# Patient Record
Sex: FEMALE | Race: WHITE | NOT HISPANIC OR LATINO | Employment: UNEMPLOYED | URBAN - METROPOLITAN AREA
[De-identification: names, ages, dates, MRNs, and addresses within clinical notes are randomized per-mention and may not be internally consistent; named-entity substitution may affect disease eponyms.]

---

## 2017-01-18 ENCOUNTER — HOSPITAL ENCOUNTER (OUTPATIENT)
Dept: RADIOLOGY | Facility: HOSPITAL | Age: 77
Discharge: HOME | End: 2017-01-18
Attending: INTERNAL MEDICINE
Payer: MEDICARE

## 2017-01-18 DIAGNOSIS — Z12.31 OTHER SCREENING MAMMOGRAM: ICD-10-CM

## 2017-01-18 PROCEDURE — 77067 SCR MAMMO BI INCL CAD: CPT | Mod: 26,,, | Performed by: RADIOLOGY

## 2017-01-18 PROCEDURE — 77067 SCR MAMMO BI INCL CAD: CPT | Mod: TC

## 2018-02-01 ENCOUNTER — HOSPITAL ENCOUNTER (OUTPATIENT)
Dept: RADIOLOGY | Facility: HOSPITAL | Age: 78
Discharge: HOME | End: 2018-02-01
Attending: INTERNAL MEDICINE
Payer: MEDICARE

## 2018-02-01 VITALS — BODY MASS INDEX: 21.68 KG/M2 | WEIGHT: 127 LBS | HEIGHT: 64 IN

## 2018-02-01 DIAGNOSIS — Z12.39 SCREENING FOR BREAST CANCER: ICD-10-CM

## 2018-02-01 PROCEDURE — 77063 BREAST TOMOSYNTHESIS BI: CPT | Mod: 26,,, | Performed by: RADIOLOGY

## 2018-02-01 PROCEDURE — 77067 SCR MAMMO BI INCL CAD: CPT | Mod: 26,,, | Performed by: RADIOLOGY

## 2018-02-01 PROCEDURE — 77067 SCR MAMMO BI INCL CAD: CPT | Mod: TC

## 2018-07-18 ENCOUNTER — TELEPHONE (OUTPATIENT)
Dept: AUDIOLOGY | Facility: CLINIC | Age: 78
End: 2018-07-18

## 2019-03-12 ENCOUNTER — HOSPITAL ENCOUNTER (OUTPATIENT)
Dept: RADIOLOGY | Facility: HOSPITAL | Age: 79
Discharge: HOME | End: 2019-03-12
Attending: INTERNAL MEDICINE
Payer: MEDICARE

## 2019-03-12 VITALS — WEIGHT: 127 LBS | BODY MASS INDEX: 22.5 KG/M2

## 2019-03-12 DIAGNOSIS — Z12.39 BREAST CANCER SCREENING: ICD-10-CM

## 2019-03-12 PROCEDURE — 77067 MAMMO DIGITAL SCREENING BILAT WITH CAD: ICD-10-PCS | Mod: 26,,, | Performed by: RADIOLOGY

## 2019-03-12 PROCEDURE — 77067 SCR MAMMO BI INCL CAD: CPT | Mod: 26,,, | Performed by: RADIOLOGY

## 2019-03-12 PROCEDURE — 77067 SCR MAMMO BI INCL CAD: CPT | Mod: TC

## 2019-03-13 ENCOUNTER — TELEPHONE (OUTPATIENT)
Dept: RADIOLOGY | Facility: HOSPITAL | Age: 79
End: 2019-03-13

## 2019-03-13 NOTE — TELEPHONE ENCOUNTER
Spoke with patient and explained mammogram findings.Patient expressed understanding of results. Patient is scheduled for a abnormal mammogram follow up appointment at [facility] [place] on 3/14/2019.

## 2019-03-14 ENCOUNTER — HOSPITAL ENCOUNTER (OUTPATIENT)
Dept: RADIOLOGY | Facility: HOSPITAL | Age: 79
Discharge: HOME | End: 2019-03-14
Attending: INTERNAL MEDICINE
Payer: MEDICARE

## 2019-03-14 DIAGNOSIS — R92.8 ABNORMAL MAMMOGRAM: ICD-10-CM

## 2019-03-14 PROCEDURE — 77065 MAMMO DIGITAL DIAGNOSTIC RIGHT WITH TOMOSYNTHESIS_CAD: ICD-10-PCS | Mod: 26,,, | Performed by: RADIOLOGY

## 2019-03-14 PROCEDURE — 76642 US BREAST RIGHT LIMITED: ICD-10-PCS | Mod: 26,RT,, | Performed by: RADIOLOGY

## 2019-03-14 PROCEDURE — 77065 DX MAMMO INCL CAD UNI: CPT | Mod: TC,PO

## 2019-03-14 PROCEDURE — 77061 MAMMO DIGITAL DIAGNOSTIC RIGHT WITH TOMOSYNTHESIS_CAD: ICD-10-PCS | Mod: 26,,, | Performed by: RADIOLOGY

## 2019-03-14 PROCEDURE — 76642 ULTRASOUND BREAST LIMITED: CPT | Mod: TC,PO,RT

## 2019-03-14 PROCEDURE — 77065 DX MAMMO INCL CAD UNI: CPT | Mod: 26,,, | Performed by: RADIOLOGY

## 2019-03-14 PROCEDURE — 76642 ULTRASOUND BREAST LIMITED: CPT | Mod: 26,RT,, | Performed by: RADIOLOGY

## 2019-03-14 PROCEDURE — 77061 BREAST TOMOSYNTHESIS UNI: CPT | Mod: TC,PO

## 2019-03-14 PROCEDURE — 77061 BREAST TOMOSYNTHESIS UNI: CPT | Mod: 26,,, | Performed by: RADIOLOGY

## 2019-03-15 ENCOUNTER — TELEPHONE (OUTPATIENT)
Dept: RADIOLOGY | Facility: HOSPITAL | Age: 79
End: 2019-03-15

## 2019-03-15 NOTE — TELEPHONE ENCOUNTER
Spoke with patient. Reviewed breast biopsy procedure and reviewed instructions for breast biopsy. Patient expressed understanding and all questions were answered. Provided patient with my phone number to call for any further concerns or questions.   Patient scheduled breast biopsy at the [facility] on 3/22/2019

## 2019-03-22 ENCOUNTER — HOSPITAL ENCOUNTER (OUTPATIENT)
Dept: RADIOLOGY | Facility: HOSPITAL | Age: 79
Discharge: HOME | End: 2019-03-22
Attending: INTERNAL MEDICINE
Payer: MEDICARE

## 2019-03-22 DIAGNOSIS — R92.8 ABNORMAL MAMMOGRAM: ICD-10-CM

## 2019-03-22 DIAGNOSIS — N63.0 LUMP OR MASS IN BREAST: Primary | ICD-10-CM

## 2019-03-22 PROCEDURE — 27201044 MAMMO BREAST STEREOTACTIC BREAST BIOPSY RIGHT: Mod: PO

## 2019-03-22 PROCEDURE — 88305 TISSUE EXAM BY PATHOLOGIST: CPT | Performed by: PATHOLOGY

## 2019-03-22 PROCEDURE — 88305 TISSUE SPECIMEN TO PATHOLOGY, RADIOLOGY: ICD-10-PCS | Mod: 26,,, | Performed by: PATHOLOGY

## 2019-03-22 PROCEDURE — 19081 BX BREAST 1ST LESION STRTCTC: CPT | Mod: PO,RT

## 2019-03-22 PROCEDURE — 19081 BX BREAST 1ST LESION STRTCTC: CPT | Mod: RT,,, | Performed by: RADIOLOGY

## 2019-03-22 PROCEDURE — 19081 MAMMO BREAST STEREOTACTIC BREAST BIOPSY RIGHT: ICD-10-PCS | Mod: RT,,, | Performed by: RADIOLOGY

## 2019-03-22 PROCEDURE — 88305 TISSUE EXAM BY PATHOLOGIST: CPT | Mod: 26,,, | Performed by: PATHOLOGY

## 2019-03-22 PROCEDURE — 25000003 PHARM REV CODE 250: Mod: PO | Performed by: INTERNAL MEDICINE

## 2019-03-22 RX ORDER — LIDOCAINE HYDROCHLORIDE 10 MG/ML
1 INJECTION, SOLUTION EPIDURAL; INFILTRATION; INTRACAUDAL; PERINEURAL ONCE
Status: COMPLETED | OUTPATIENT
Start: 2019-03-22 | End: 2019-03-22

## 2019-03-22 RX ORDER — LIDOCAINE HYDROCHLORIDE AND EPINEPHRINE 20; 10 MG/ML; UG/ML
8 INJECTION, SOLUTION INFILTRATION; PERINEURAL ONCE
Status: COMPLETED | OUTPATIENT
Start: 2019-03-22 | End: 2019-03-22

## 2019-03-22 RX ADMIN — LIDOCAINE HYDROCHLORIDE,EPINEPHRINE BITARTRATE 8 ML: 20; .01 INJECTION, SOLUTION INFILTRATION; PERINEURAL at 11:03

## 2019-03-22 RX ADMIN — LIDOCAINE HYDROCHLORIDE 1 ML: 10 INJECTION, SOLUTION EPIDURAL; INFILTRATION; INTRACAUDAL; PERINEURAL at 11:03

## 2019-03-25 ENCOUNTER — DOCUMENTATION ONLY (OUTPATIENT)
Dept: SURGERY | Facility: CLINIC | Age: 79
End: 2019-03-25

## 2019-03-25 NOTE — PROGRESS NOTES
Patient phoned with results of core breast biopsy, benign FA, informed of need for 6 month f/u mmg

## 2019-05-22 ENCOUNTER — OFFICE VISIT (OUTPATIENT)
Dept: OTOLARYNGOLOGY | Facility: CLINIC | Age: 79
End: 2019-05-22
Payer: MEDICARE

## 2019-05-22 ENCOUNTER — TELEPHONE (OUTPATIENT)
Dept: OTOLARYNGOLOGY | Facility: CLINIC | Age: 79
End: 2019-05-22

## 2019-05-22 VITALS
DIASTOLIC BLOOD PRESSURE: 76 MMHG | SYSTOLIC BLOOD PRESSURE: 164 MMHG | HEART RATE: 70 BPM | BODY MASS INDEX: 22.5 KG/M2 | WEIGHT: 127 LBS

## 2019-05-22 DIAGNOSIS — H61.91 LESION OF RIGHT EXTERNAL EAR: Primary | ICD-10-CM

## 2019-05-22 PROCEDURE — 99202 OFFICE O/P NEW SF 15 MIN: CPT | Mod: S$PBB,,, | Performed by: OTOLARYNGOLOGY

## 2019-05-22 PROCEDURE — 99999 PR PBB SHADOW E&M-EST. PATIENT-LVL III: CPT | Mod: PBBFAC,,, | Performed by: OTOLARYNGOLOGY

## 2019-05-22 PROCEDURE — 99999 PR PBB SHADOW E&M-EST. PATIENT-LVL III: ICD-10-PCS | Mod: PBBFAC,,, | Performed by: OTOLARYNGOLOGY

## 2019-05-22 PROCEDURE — 99213 OFFICE O/P EST LOW 20 MIN: CPT | Mod: PBBFAC | Performed by: OTOLARYNGOLOGY

## 2019-05-22 PROCEDURE — 99202 PR OFFICE/OUTPT VISIT, NEW, LEVL II, 15-29 MIN: ICD-10-PCS | Mod: S$PBB,,, | Performed by: OTOLARYNGOLOGY

## 2019-05-22 NOTE — LETTER
May 22, 2019      Abran Terrazas MD  [address]  [address]  [facility] [address]           [facility] - Otorhinolaryngology  [address]  [facility] [address]  Phone: [phone]  Fax: [phone]          Patient: Ellen Neves   MR Number: [id number]   YOB: 1940   Date of Visit: 5/22/2019       Dear Dr. Abran Terrazas:    Thank you for referring Ellen Neves to me for evaluation. Attached you will find relevant portions of my assessment and plan of care.    If you have questions, please do not hesitate to call me. I look forward to following Ellen Neves along with you.    Sincerely,    Jim Ireland III, MD    Enclosure  CC:  No Recipients    If you would like to receive this communication electronically, please contact [email or web address] or [phone] to request more information on [employer] Link access.    For providers and/or their staff who would like to refer a patient to [facility], please contact us through our one-stop-shop provider referral line, [facility], at [phone].    If you feel you have received this communication in error or would no longer like to receive these types of communications, please e-mail [email or web address]

## 2019-05-22 NOTE — TELEPHONE ENCOUNTER
----- Message from Yani Velasco sent at 5/22/2019  4:03 PM CDT -----  Contact: patient  [phone]-please call above patient wants sooner appointment waiting on a call from the nurse thanks

## 2019-05-22 NOTE — PROGRESS NOTES
Subjective:       Patient ID: Ellen Neves is a 79 y.o. female.    Chief Complaint: Otitis Media    HPI: Ms. Neves is an 79-year-old fair complected  female who relates how much help I gave her for treatment of her right ear lesion in 2011.   Her hand written reason for the visit today is ear infection .  She indicates some pain and inflammation of a lesion of her right pinna which will not heal entirely at this point.  It will improve with antibiotic treatment for a while but then recurs..   I am unable define EMR notes regarding her treatment of the problem, however.  There is a note in the EMR about her chronic opioid use.  She was referred to me by her dermatologist Sarah Vazquez who recently biopsied the  right pinna lesion.  It was, apparently, benign.  I would like to see the report if possible.  She indicates inflammation of an area of her right pinna which will not heal completely at this point.  She lives here but has a house in [facility].    She was examined by ENT physician in [place] who prescribed an oral course of doxycycline antibiotics for 10 days for treatment of the right pinna lesion.  Her ear did get better.  She finished that course 2 weeks ago.  She was also prescribed Bactroban ointment for topical treatment of the lesion as well as a short course of triamcinolone 0.1% x5 days afterward.  She used to complain of pain and discomfort of the right pinna at this point.  The lesion will not heal entirely at this point.  She admits to applying the Bactroban with her finger.  I have asked my colleague Dr. NEMESIO Nunez to step in and give his advice about treatment of her right pinna lesion this afternoon.    Past Medical History:   Diagnosis Date    Anemia     Cataract     GERD (gastroesophageal reflux disease)     Hypertension     Macular degeneration     Migraine      Current Outpatient Medications on File Prior to Visit   Medication Sig Dispense Refill     amLODIPine (NORVASC) 5 MG tablet TAKE 1 TABLET DAILY 90 tablet 3    citalopram (CELEXA) 20 MG tablet TAKE 1 TABLET DAILY 90 tablet 3    clobetasol 0.05% (TEMOVATE) 0.05 % Oint   0    hydroCHLOROthiazide (HYDRODIURIL) 25 MG tablet Take 1 tablet (25 mg total) by mouth once daily. For blood pressure. 90 tablet 3    lisinopril (PRINIVIL,ZESTRIL) 40 MG tablet Take 1 tablet (40 mg total) by mouth once daily. 90 tablet 3    nadolol (CORGARD) 40 MG tablet TAKE 1 TABLET TWICE A  tablet 3    traMADol (ULTRAM) 50 mg tablet TAKE 1 TABLET BY MOUTH EVERY 6-8 HOURS AS NEEDED FOR PAIN 100 tablet 0    zolpidem (AMBIEN) 10 mg Tab TAKE 1 TABLET(10 MG) BY MOUTH EVERY NIGHT AS NEEDED 90 tablet 0     No current facility-administered medications on file prior to visit.      Past Surgical History:   Procedure Laterality Date    APPENDECTOMY      BREAST BIOPSY Left     EXCISIONAL BX    BREAST SURGERY      biopsy    BUNIONECTOMY      COSMETIC SURGERY      facelift    EYE SURGERY      bilateral cataracts    Family history:  High blood pressure  Habits:  1 alcoholic drink per day 1 caffeinated drink per day  Occupation:  Retired    Review of Systems   Ears: Positive for ear pain.    Other:  Negative for rash.           Objective:     Blood pressure 164/76 pulse 70 height 5 ft 3 in weight 127 lb  General:  Alert and oriented lady in no acute distress  Physical Exam   Constitutional: She is oriented to person, place, and time. She appears well-developed and well-nourished.   HENT:   Head: Normocephalic.   Right Ear: Tympanic membrane and external ear normal. No drainage. No foreign bodies. No mastoid tenderness. Tympanic membrane is not perforated. No decreased hearing is noted.   Left Ear: Tympanic membrane and external ear normal. No drainage. No foreign bodies. No mastoid tenderness. Tympanic membrane is not perforated. No decreased hearing is noted.   Ears:    Nose: Nose normal. No nasal deformity, septal deviation or  nasal septal hematoma. No epistaxis. Right sinus exhibits no maxillary sinus tenderness and no frontal sinus tenderness. Left sinus exhibits no maxillary sinus tenderness and no frontal sinus tenderness.   Mouth/Throat: Uvula is midline, oropharynx is clear and moist and mucous membranes are normal. No oral lesions. No trismus in the jaw. No uvula swelling. No oropharyngeal exudate or tonsillar abscesses.   Neck: Neck supple. No tracheal deviation present. No thyromegaly present.   Pulmonary/Chest: Effort normal. No stridor.   Lymphadenopathy:     She has no cervical adenopathy.   Neurological: She is alert and oriented to person, place, and time.   Skin: No rash noted.       Assessment:       1. Lesion of right external ear        Plan:     Written Rx for medrol dospak 4 mg # 21; take 6 pills x 1 day, 5 pills x next day, 4 pills x nextday, 3 pills x next day, 2 pills x next day, 1 pill x next  day all with food; take for inflammation  May clean wound with peroxide; use sterile cotton swabs only ( not finger)   May apply Bactroban to ear wound nightly x 10-14 days with sterile swabs  Written Rx for Cipro 500 mg # 20; take BID for ear infection ( may use only after completion of oral steroid course )    Patient warned about tendon/joint side effects  Avoid laying on right pinna/trauma to lesion/ wound  RTC 2 weeks  Recent ear bx report requested ( dermatologist CHRIS Vazquez)

## 2019-05-22 NOTE — PATIENT INSTRUCTIONS
Written Rx for medrol dospak 4 mg # 21; take 6 pills x 1 day, 5 pills x next day, 4 pills x nextday, 3 pills x next day, 2 pills x next day, 1 pill x next  day all with food; take for inflammation  May clean wound with peroxide; use sterile cotton swabs only ( not finger)   May apply Bactroban to ear wound nightly x 10-14 days  Written Rx for Cipro 500 mg # 20; take BID for ear infection ( may use only after completion of oral steroid course )    Patient warned about tendon/joint side effects  RTC 2 weeks  Recent ear bx report requested ( dermatologist CHRIS Vazquez)

## 2019-06-05 DIAGNOSIS — H66.90 EAR INFECTION: Primary | ICD-10-CM

## 2019-06-19 ENCOUNTER — OFFICE VISIT (OUTPATIENT)
Dept: OTOLARYNGOLOGY | Facility: CLINIC | Age: 79
End: 2019-06-19
Payer: MEDICARE

## 2019-06-19 VITALS — SYSTOLIC BLOOD PRESSURE: 136 MMHG | DIASTOLIC BLOOD PRESSURE: 72 MMHG | HEART RATE: 68 BPM

## 2019-06-19 DIAGNOSIS — H61.91 LESION OF EXTERNAL EAR CANAL, RIGHT: Primary | ICD-10-CM

## 2019-06-19 PROCEDURE — 99212 PR OFFICE/OUTPT VISIT, EST, LEVL II, 10-19 MIN: ICD-10-PCS | Mod: S$PBB,,, | Performed by: OTOLARYNGOLOGY

## 2019-06-19 PROCEDURE — 99999 PR PBB SHADOW E&M-EST. PATIENT-LVL III: CPT | Mod: PBBFAC,,, | Performed by: OTOLARYNGOLOGY

## 2019-06-19 PROCEDURE — 99999 PR PBB SHADOW E&M-EST. PATIENT-LVL III: ICD-10-PCS | Mod: PBBFAC,,, | Performed by: OTOLARYNGOLOGY

## 2019-06-19 PROCEDURE — 99212 OFFICE O/P EST SF 10 MIN: CPT | Mod: S$PBB,,, | Performed by: OTOLARYNGOLOGY

## 2019-06-19 PROCEDURE — 99213 OFFICE O/P EST LOW 20 MIN: CPT | Mod: PBBFAC | Performed by: OTOLARYNGOLOGY

## 2019-06-19 NOTE — PROGRESS NOTES
Subjective:       Patient ID: Ellen Neves is a 79 y.o. female.    Chief Complaint: Follow-up    HPI: Ms. Neves is an 79-year-old  female indicates significant improvement and healing of her right pinna lesion now.  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 She was prescribed an oral course of Cipro 500 mg twice a day for 10 days as well as a Medrol Dosepak.  She was advised to apply Bactroban to the wound after gentle cleaning with peroxide.  She started a 2nd round of oral Cipro per her request 6/5/19; after taking the medication for several days, she began to  feel poorly with possible joint pain.  She may have been suffering with a bug .  She stopped taking the medication.  She had been applying Bactroban ointment to the lesion as advised which caused some burning; that treatment was also discontinued.   She has been utilizing Aquaphore healing ointment with good results at this point.  The  Her dermatologist is Dr. Sarah Vazquez.  I was supposed to have received the path report from her previous ear biopsy; I cannot located in the EMR presently.  She has no other complaints today.  Her right ear does not bother her at this point.    Past Medical History:   Diagnosis Date    Anemia     Cataract     GERD (gastroesophageal reflux disease)     Hypertension     Macular degeneration     Migraine      Current Outpatient Medications on File Prior to Visit   Medication Sig Dispense Refill    amLODIPine (NORVASC) 5 MG tablet TAKE 1 TABLET DAILY 90 tablet 3    citalopram (CELEXA) 20 MG tablet TAKE 1 TABLET DAILY 90 tablet 3    clobetasol 0.05% (TEMOVATE) 0.05 % Oint   0    hydroCHLOROthiazide (HYDRODIURIL) 25 MG tablet Take 1 tablet (25 mg total) by mouth once daily. For blood pressure. 90 tablet 3    lisinopril (PRINIVIL,ZESTRIL) 40 MG tablet Take 1 tablet (40 mg total) by mouth once daily. 90 tablet 3    lisinopril  (PRINIVIL,ZESTRIL) 40 MG tablet TAKE 1 TABLET DAILY 90 tablet 3    nadolol (CORGARD) 40 MG tablet TAKE 1 TABLET TWICE A  tablet 3    traMADol (ULTRAM) 50 mg tablet TAKE 1 TABLET BY MOUTH EVERY 6-8 HOURS AS NEEDED FOR PAIN 100 tablet 0    zolpidem (AMBIEN) 10 mg Tab TAKE 1 TABLET(10 MG) BY MOUTH EVERY NIGHT AS NEEDED 90 tablet 0     No current facility-administered medications on file prior to visit.          Review of Systems        Objective:     Blood pressure 136/72 pulse 68 height 5 ft 3 in weight 126 lb  General:  Alert and oriented lady in no acute distress  Physical Exam   HENT:   Ears:        Assessment:       1. Lesion of external ear canal, right        Plan:       Right pinna lesion appears healed now  RTC prn

## 2019-07-23 ENCOUNTER — TELEPHONE (OUTPATIENT)
Dept: OTOLARYNGOLOGY | Facility: CLINIC | Age: 79
End: 2019-07-23

## 2019-07-29 ENCOUNTER — OFFICE VISIT (OUTPATIENT)
Dept: OTOLARYNGOLOGY | Facility: CLINIC | Age: 79
End: 2019-07-29
Payer: MEDICARE

## 2019-07-29 ENCOUNTER — CLINICAL SUPPORT (OUTPATIENT)
Dept: AUDIOLOGY | Facility: CLINIC | Age: 79
End: 2019-07-29
Payer: MEDICARE

## 2019-07-29 VITALS — DIASTOLIC BLOOD PRESSURE: 72 MMHG | HEART RATE: 61 BPM | SYSTOLIC BLOOD PRESSURE: 151 MMHG

## 2019-07-29 DIAGNOSIS — H93.8X2 EAR PRESSURE, LEFT: Primary | ICD-10-CM

## 2019-07-29 DIAGNOSIS — H91.90 PERCEIVED HEARING LOSS: ICD-10-CM

## 2019-07-29 DIAGNOSIS — H90.3 SENSORINEURAL HEARING LOSS, BILATERAL: Primary | ICD-10-CM

## 2019-07-29 DIAGNOSIS — H61.22 HEARING LOSS DUE TO CERUMEN IMPACTION, LEFT: ICD-10-CM

## 2019-07-29 PROCEDURE — 99213 OFFICE O/P EST LOW 20 MIN: CPT | Mod: PBBFAC,25,27 | Performed by: OTOLARYNGOLOGY

## 2019-07-29 PROCEDURE — 99999 PR PBB SHADOW E&M-EST. PATIENT-LVL III: ICD-10-PCS | Mod: PBBFAC,,, | Performed by: OTOLARYNGOLOGY

## 2019-07-29 PROCEDURE — 69210 REMOVE IMPACTED EAR WAX UNI: CPT | Mod: S$PBB,,, | Performed by: OTOLARYNGOLOGY

## 2019-07-29 PROCEDURE — 99999 PR PBB SHADOW E&M-EST. PATIENT-LVL I: CPT | Mod: PBBFAC,,,

## 2019-07-29 PROCEDURE — 92567 TYMPANOMETRY: CPT | Mod: PBBFAC | Performed by: AUDIOLOGIST-HEARING AID FITTER

## 2019-07-29 PROCEDURE — 92557 COMPREHENSIVE HEARING TEST: CPT | Mod: PBBFAC | Performed by: AUDIOLOGIST-HEARING AID FITTER

## 2019-07-29 PROCEDURE — 99213 PR OFFICE/OUTPT VISIT, EST, LEVL III, 20-29 MIN: ICD-10-PCS | Mod: 25,S$PBB,, | Performed by: OTOLARYNGOLOGY

## 2019-07-29 PROCEDURE — 69210 REMOVE IMPACTED EAR WAX UNI: CPT | Mod: PBBFAC | Performed by: OTOLARYNGOLOGY

## 2019-07-29 PROCEDURE — 99213 OFFICE O/P EST LOW 20 MIN: CPT | Mod: 25,S$PBB,, | Performed by: OTOLARYNGOLOGY

## 2019-07-29 PROCEDURE — 99211 OFF/OP EST MAY X REQ PHY/QHP: CPT | Mod: PBBFAC,25

## 2019-07-29 PROCEDURE — 99999 PR PBB SHADOW E&M-EST. PATIENT-LVL I: ICD-10-PCS | Mod: PBBFAC,,,

## 2019-07-29 PROCEDURE — 69210 PR REMOVAL IMPACTED CERUMEN REQUIRING INSTRUMENTATION, UNILATERAL: ICD-10-PCS | Mod: S$PBB,,, | Performed by: OTOLARYNGOLOGY

## 2019-07-29 PROCEDURE — 99999 PR PBB SHADOW E&M-EST. PATIENT-LVL III: CPT | Mod: PBBFAC,,, | Performed by: OTOLARYNGOLOGY

## 2019-07-29 NOTE — PATIENT INSTRUCTIONS
C.I. extracted from occluded AS eac  Audiometry reviewed: AS > AD SNHL  Low sodium diet encouraged; literature provided  Continue diuretic use  Meniere's/cochlear hydrops  literature provided  RTC 2 weeks; repeat AS audiometry ;  Previous audiogram may be helpful ( Dr. CARIDAD Terrazas)  Patient may be a candidate for amplification for 1 or both ears pending course/stability of hearing loss
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
## 2019-07-29 NOTE — PROGRESS NOTES
Ellen Neves was seen in the clinic today for a hearing evaluation. Ms. Neves reported aural fullness and hearing loss worse in her left ear.      Audiological testing revealed a mild to moderate sensorineural hearing loss in the right ear and a mild to moderately severe sensorineural hearing loss in the left ear. A speech reception threshold was obtained at 25 dBHL in the right ear and 40 dBHL in the left ear. Speech recognition was 96% in the right ear and 92% in the left ear.    Tympanometry revealed normal Type A tympanograms, bilaterally.    Recommendations:  1. Otologic evaluation  2. Annual hearing evaluation  3. Hearing aid consult

## 2019-08-19 ENCOUNTER — CLINICAL SUPPORT (OUTPATIENT)
Dept: AUDIOLOGY | Facility: CLINIC | Age: 79
End: 2019-08-19
Payer: MEDICARE

## 2019-08-19 ENCOUNTER — OFFICE VISIT (OUTPATIENT)
Dept: OTOLARYNGOLOGY | Facility: CLINIC | Age: 79
End: 2019-08-19
Payer: MEDICARE

## 2019-08-19 VITALS — SYSTOLIC BLOOD PRESSURE: 181 MMHG | DIASTOLIC BLOOD PRESSURE: 71 MMHG | HEART RATE: 61 BPM

## 2019-08-19 DIAGNOSIS — E87.1 HYPONATREMIA: ICD-10-CM

## 2019-08-19 DIAGNOSIS — H90.3 SENSORINEURAL HEARING LOSS, BILATERAL: Primary | ICD-10-CM

## 2019-08-19 PROCEDURE — 99213 OFFICE O/P EST LOW 20 MIN: CPT | Mod: PBBFAC,25 | Performed by: OTOLARYNGOLOGY

## 2019-08-19 PROCEDURE — 99999 PR PBB SHADOW E&M-EST. PATIENT-LVL III: CPT | Mod: PBBFAC,,, | Performed by: OTOLARYNGOLOGY

## 2019-08-19 PROCEDURE — 99213 OFFICE O/P EST LOW 20 MIN: CPT | Mod: S$PBB,,, | Performed by: OTOLARYNGOLOGY

## 2019-08-19 PROCEDURE — 99999 PR PBB SHADOW E&M-EST. PATIENT-LVL III: ICD-10-PCS | Mod: PBBFAC,,, | Performed by: OTOLARYNGOLOGY

## 2019-08-19 PROCEDURE — 99213 PR OFFICE/OUTPT VISIT, EST, LEVL III, 20-29 MIN: ICD-10-PCS | Mod: S$PBB,,, | Performed by: OTOLARYNGOLOGY

## 2019-08-19 PROCEDURE — 92557 COMPREHENSIVE HEARING TEST: CPT | Mod: 52,PBBFAC | Performed by: AUDIOLOGIST

## 2019-08-19 NOTE — PROGRESS NOTES
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 Abran Terrazas due to documented hyponatremia.  She had been recently examined by me 07/29/2019 for a pressure sensation in her left ear >> right ear as if she were on an airplane.  The otologic symptom occurred while she was at the beach on a vacation.    She also indicated dizziness symptoms.   Audiometry indicated a left ear multi frequency sensorineural hearing loss greater than right ear sensorineural hearing loss the left ear 40 decibel SRT score versus a right ear 25 decibel SRT score. She had indicated left aural fullness greater than right to the audiologist.  A hearing aid consultation was recommended pending her course impending to stability of her hearing over time.  I had recently treated her for right pinna inflammation of the anti helical skin which resolved with topical treatment.  She , now, ndicates slight improvement in the way she feels after discontinuing the diuretic.    She indicates a previous history of migraine headaches, improved after age 69.    She is no longer dizzy.  She denies significant ear pain or pressure symptoms now.  She does suffer with headaches at times.   Her left ear symptoms have not completely however.    Past Medical History:   Diagnosis Date    Anemia     Cataract     GERD (gastroesophageal reflux disease)     Hypertension     Macular degeneration     Migraine      Current Outpatient Medications on File Prior to Visit   Medication Sig Dispense Refill    amLODIPine (NORVASC) 5 MG tablet TAKE 1 TABLET DAILY 90 tablet 3    citalopram (CELEXA) 20 MG tablet TAKE 1 TABLET DAILY 90 tablet 3    clobetasol 0.05% (TEMOVATE) 0.05 % Oint   0    hydroCHLOROthiazide (HYDRODIURIL) 25 MG tablet Take 1 tablet (25 mg total) by mouth once daily. For blood pressure. 90  tablet 3    lisinopril (PRINIVIL,ZESTRIL) 40 MG tablet Take 1 tablet (40 mg total) by mouth once daily. 90 tablet 3    nadolol (CORGARD) 40 MG tablet TAKE 1 TABLET TWICE A  tablet 3    traMADol (ULTRAM) 50 mg tablet TAKE 1 TABLET BY MOUTH EVERY 6-8 HOURS AS NEEDED FOR PAIN 100 tablet 0    zolpidem (AMBIEN) 10 mg Tab TAKE 1 TABLET(10 MG) BY MOUTH EVERY NIGHT AS NEEDED 90 tablet 0     No current facility-administered medications on file prior to visit.      Past Surgical History:   Procedure Laterality Date    APPENDECTOMY      BREAST BIOPSY Left     EXCISIONAL BX    BREAST SURGERY      biopsy    BUNIONECTOMY      COSMETIC SURGERY      facelift    EYE SURGERY      bilateral cataracts         PE:  Blood pressure 181/71 pulse 61 height 5 ft 3 in weight 125 lb  General:  Alert and oriented lady in no acute distress  Both ears were examined per otoscopy.  Right eardrum is intact and clear right middle ear space is well aerated.  The right anti helical tissues appear well healed without inflammation.  Left eardrum is intact and clear as visualized, as before.  Ms. Neves completed an audiometric study today the results of which were duplicated below compared to those of a previous study.  I am in receipt of Dr. Abran Terrazas audiometric study performed sometime in 2018 in his office.    The rresults indicated bilateral 25 and 40 decibel hearing levels tested 4000 hertz 2000 hertz and 1000 hertz symmetrically.      DIAGNOSIS:     ICD-10-CM ICD-9-CM    1. Asymmetrical hearing loss of left ear H91.8X2 389.8    2. Hyponatremia; recent diuretic withdrawal E87.1 276.1      PLAN: AS audiometry reviewed; results compared to those of previous study ( audiometry per Dr. CARIDAD Terrazas, 2018)   Dietary salt restriction to be continued for now  Pt. will monitor electrolytes/blood pressure/otologic sx for next several weeks ( Dr. CARIDAD Terrazas)   RTC 3-4 weeks; repeat audiometry ( both ears)   Call for any significant change in  otologic status

## 2019-08-19 NOTE — PROGRESS NOTES
Ellen Neves was seen in the clinic today for a follow-up audiological evaluation of the left ear.    Audiological testing revealed a moderate rising to mild sloping to moderately-severe sensorineural hearing loss for the left ear.  A speech reception threshold was obtained at 40 dBHL for the left ear.  Speech discrimination was 92% for the left ear.      Recommendations:  1. Otologic evaluation  2. Annual audiological evaluation  3. Hearing protection when in noise   4. Hearing aid consultation

## 2019-08-19 NOTE — PATIENT INSTRUCTIONS
AS audiometry reviewed; results compared to those of previous study ( audiometry per Dr. Terrazas, 2018)   Dietary salt restriction to be continued for now  Pt. will monitor electrolytes/blood pressure/otologic sx for next several weeks ( Dr. CARIDAD Terrazas)   RTC 3-4 weeks; repeat audiometry ( both ears)   Call for any significant change in otologic status

## 2019-08-27 ENCOUNTER — LAB VISIT (OUTPATIENT)
Dept: LAB | Facility: OTHER | Age: 79
End: 2019-08-27
Attending: INTERNAL MEDICINE
Payer: MEDICARE

## 2019-08-27 DIAGNOSIS — R89.9 ABNORMAL LABORATORY TEST: ICD-10-CM

## 2019-08-27 LAB
ANION GAP SERPL CALC-SCNC: 6 MMOL/L (ref 8–16)
BUN SERPL-MCNC: 13 MG/DL (ref 8–23)
CALCIUM SERPL-MCNC: 10.3 MG/DL (ref 8.7–10.5)
CHLORIDE SERPL-SCNC: 100 MMOL/L (ref 95–110)
CO2 SERPL-SCNC: 27 MMOL/L (ref 23–29)
CREAT SERPL-MCNC: 0.7 MG/DL (ref 0.5–1.4)
EST. GFR  (AFRICAN AMERICAN): >60 ML/MIN/1.73 M^2
EST. GFR  (NON AFRICAN AMERICAN): >60 ML/MIN/1.73 M^2
GLUCOSE SERPL-MCNC: 96 MG/DL (ref 70–110)
POTASSIUM SERPL-SCNC: 4.8 MMOL/L (ref 3.5–5.1)
SODIUM SERPL-SCNC: 133 MMOL/L (ref 136–145)

## 2019-08-27 PROCEDURE — 36415 COLL VENOUS BLD VENIPUNCTURE: CPT

## 2019-08-27 PROCEDURE — 80048 BASIC METABOLIC PNL TOTAL CA: CPT

## 2019-09-17 ENCOUNTER — HOSPITAL ENCOUNTER (OUTPATIENT)
Dept: RADIOLOGY | Facility: HOSPITAL | Age: 79
Discharge: HOME | End: 2019-09-17
Attending: INTERNAL MEDICINE
Payer: MEDICARE

## 2019-09-17 VITALS — BODY MASS INDEX: 22.15 KG/M2 | HEIGHT: 63 IN | WEIGHT: 125 LBS

## 2019-09-17 DIAGNOSIS — R92.8 ABNORMAL MAMMOGRAM: ICD-10-CM

## 2019-09-17 PROCEDURE — 77061 BREAST TOMOSYNTHESIS UNI: CPT | Mod: 26,,, | Performed by: RADIOLOGY

## 2019-09-17 PROCEDURE — 77065 MAMMO DIGITAL DIAGNOSTIC RIGHT WITH TOMOSYNTHESIS_CAD: ICD-10-PCS | Mod: 26,,, | Performed by: RADIOLOGY

## 2019-09-17 PROCEDURE — 77061 MAMMO DIGITAL DIAGNOSTIC RIGHT WITH TOMOSYNTHESIS_CAD: ICD-10-PCS | Mod: 26,,, | Performed by: RADIOLOGY

## 2019-09-17 PROCEDURE — 77061 BREAST TOMOSYNTHESIS UNI: CPT | Mod: TC,PO

## 2019-09-17 PROCEDURE — 77065 DX MAMMO INCL CAD UNI: CPT | Mod: TC,PO

## 2019-09-17 PROCEDURE — 77065 DX MAMMO INCL CAD UNI: CPT | Mod: 26,,, | Performed by: RADIOLOGY

## 2019-10-28 ENCOUNTER — CLINICAL SUPPORT (OUTPATIENT)
Dept: AUDIOLOGY | Facility: CLINIC | Age: 79
End: 2019-10-28
Payer: MEDICARE

## 2019-10-28 ENCOUNTER — OFFICE VISIT (OUTPATIENT)
Dept: OTOLARYNGOLOGY | Facility: CLINIC | Age: 79
End: 2019-10-28
Payer: MEDICARE

## 2019-10-28 VITALS
BODY MASS INDEX: 21.87 KG/M2 | SYSTOLIC BLOOD PRESSURE: 118 MMHG | WEIGHT: 123.44 LBS | HEART RATE: 71 BPM | DIASTOLIC BLOOD PRESSURE: 77 MMHG

## 2019-10-28 PROCEDURE — 99213 OFFICE O/P EST LOW 20 MIN: CPT | Mod: PBBFAC,25 | Performed by: OTOLARYNGOLOGY

## 2019-10-28 PROCEDURE — 99213 OFFICE O/P EST LOW 20 MIN: CPT | Mod: S$PBB,,, | Performed by: OTOLARYNGOLOGY

## 2019-10-28 PROCEDURE — 99499 UNLISTED E&M SERVICE: CPT | Mod: S$PBB,,, | Performed by: AUDIOLOGIST

## 2019-10-28 PROCEDURE — 99499 NO LOS: ICD-10-PCS | Mod: S$PBB,,, | Performed by: AUDIOLOGIST

## 2019-10-28 PROCEDURE — 99999 PR PBB SHADOW E&M-EST. PATIENT-LVL III: CPT | Mod: PBBFAC,,, | Performed by: OTOLARYNGOLOGY

## 2019-10-28 PROCEDURE — 99213 PR OFFICE/OUTPT VISIT, EST, LEVL III, 20-29 MIN: ICD-10-PCS | Mod: S$PBB,,, | Performed by: OTOLARYNGOLOGY

## 2019-10-28 PROCEDURE — 92557 COMPREHENSIVE HEARING TEST: CPT | Mod: PBBFAC | Performed by: AUDIOLOGIST

## 2019-10-28 PROCEDURE — 99999 PR PBB SHADOW E&M-EST. PATIENT-LVL III: ICD-10-PCS | Mod: PBBFAC,,, | Performed by: OTOLARYNGOLOGY

## 2019-10-28 NOTE — PROGRESS NOTES
10/28/2019    AUDIOLOGICAL EVALUATION:    Ellen Neves was seen for an audiological evaluation on 10/28/2019 to monitor hearing levels.      Pure tone threshold testing revealed an asymmetrical left sensorineural hearing loss.  Speech reception thresholds were obtained at 30dBHL for the right ear and 45dBHL for the left ear.  Speech discrimination scores were obtained at 96% for the right ear and 88% for the left ear.    Recommend:  1.  Otologic evaluation.  2.  Hearing aid evaluation.  3.  Annual evaluation to monitor hearing levels.

## 2019-10-28 NOTE — PROGRESS NOTES
Ms. Neves  was seen today for a hearing aid consult. Based on her lifestyle and because Ms. Neves has macular degeneration it was recommended she be fit with the followin Resound LinxQuattro 961- R (changing a HA battery would be very difficult per patient)   Medium Blonde  Size 1 LP Receivers  Small Open Domes    Ms. Neves  will return for a hearing aid pick-up on  at 10am, or sooner if there is a morning cancellation.

## 2019-10-28 NOTE — PATIENT INSTRUCTIONS
Audiometry reviewed; no change in AS hearing compared to previous study  Pt. is a candidate for amplification for one ( AS)or both ears  Copy of audiogram/RON Mendoza;'s card provided;   Pt.directed to RON Mendoza's office after exit from clinic suite today; HAC to be scheduled  Avoid salt   Call for any significant change in otologic status

## 2019-10-28 NOTE — PROGRESS NOTES
"CC:AS hearing loss  HPI:Ms. Neves is an 79-year-old  female who continues to c/o perceived hearing loss in her left ear.  She has been examined by me in late July this year for a pressure sensation in her left ear greater than right ears if she were "on an airplane".   The AS change in hearing occurred while [place] was at the beach on [place] vacation.  She had also indicated some dizziness symptoms.    Audiometric study did indeed indicate asymmetric hearing loss in the left ear with a left ear 40 decibel SRT score compared to the right ear 25 decibel SRT scores.    She had completed a CT scan of the head without contrast in January 2015 which indicated no acute intracranial abnormality.  There was extra calvarial soft tissue swelling overlying the inferior right frontal bone then.  She completed an MRI/MRA scans of the brain with without contrast in January 2012 for vertigo symptoms.  There was evidence of age-appropriate microvascular ischemic changes with the study was otherwise unremarkable.  The left vertebral artery was dominant.  The visualized paranasal sinuses and mastoid air cells were clear.  The distal vertebral arteries, basilar artery and posterior cerebral arteries were all patent without evidence for significant focal stenosis or aneurysm then    I had treated Ms. Solano's right pinna inflammation problem earlier this year which responded well to topical treatment.  She utilizes a special pillow when sleeping now.    Past Medical History:   Diagnosis Date    Anemia     Cataract     GERD (gastroesophageal reflux disease)     Hypertension     Macular degeneration     Migraine      Current Outpatient Medications on File Prior to Visit   Medication Sig Dispense Refill    amLODIPine (NORVASC) 5 MG tablet TAKE 1 TABLET DAILY 90 tablet 3    citalopram (CELEXA) 20 MG tablet TAKE 1 TABLET DAILY 90 tablet 3    clobetasol 0.05% (TEMOVATE) 0.05 % Oint   0    hydroCHLOROthiazide (HYDRODIURIL) " 25 MG tablet Take 1 tablet (25 mg total) by mouth once daily. For blood pressure. 90 tablet 3    lisinopril (PRINIVIL,ZESTRIL) 40 MG tablet Take 1 tablet (40 mg total) by mouth once daily. 90 tablet 3    nadolol (CORGARD) 40 MG tablet TAKE 1 TABLET TWICE A  tablet 3    traMADol (ULTRAM) 50 mg tablet TAKE 1 TABLET BY MOUTH EVERY 6-8 HOURS AS NEEDED FOR PAIN More than 7 day quantity medically necessary 100 tablet 0    zolpidem (AMBIEN) 10 mg Tab TAKE 1 TABLET(10 MG) BY MOUTH EVERY NIGHT AS NEEDED 90 tablet 0     No current facility-administered medications on file prior to visit.          PE:  Blood pressure 118/77 pulse 71 height 5 ft 3 in weight 123 lb  General:  Alert and oriented lady in no acute distress  Both ears were examined under the microscope in the micro procedure room.  Both TMs are clear and intact in both middle ear spaces appear well aerated.  The patient completed an audiometric study today results of which duplicated below and compared to those of previous studies.      DIAGNOSIS:     ICD-10-CM ICD-9-CM    1. Asymmetrical hearing loss of both ears H91.8X3 389.8      PLAN: Audiometry reviewed; no change in AS hearing compared to previous study  Pt. is a candidate for amplification for one ( AS)or both ears  Copy of audiogram/RON Mendoza;'s card provided;   Pt.directed to RON Mendoza's office after exit from clinic suite today; HAC to be scheduled  Avoid salt   Call for any significant change in otologic status

## 2019-11-12 NOTE — PROGRESS NOTES
Mrs. Ellen Neves was seen today for a hearing aid fitting. Mrs. Ellen Neves was fit with the following:     ReSound LiNX Quattro 9   Rechargeable   Medium Blonde   Lt SN  [id number]   Rt SN  [id number]   : 1LP   Dome: Small Open   Warranty Exp 11/28/22      SN [id number]      was counseled on care, use and maintenance of the hearing aids. Mrs. Neves has macular degeneration and is not able to see well. She was counseled on how to put the hearing aids on her ears and in the  by feel it was because of this that the hearing aids were not paired to her iPhone at this time. The hearing aids had to be decreased to 80% gain because Mrs. Neves felt they were very loud.  Mrs. Ellen Neves will return on December 4th for a follow-up. She was advised to call our office before then if she's experiencing any issues or difficulty. Her two-week follow-up was pushed back a week due to the Thanksgiving Holiday.

## 2019-11-13 ENCOUNTER — CLINICAL SUPPORT (OUTPATIENT)
Dept: AUDIOLOGY | Facility: CLINIC | Age: 79
End: 2019-11-13
Payer: MEDICARE

## 2019-11-13 PROCEDURE — 99499 NO LOS: ICD-10-PCS | Mod: S$PBB,,, | Performed by: AUDIOLOGIST

## 2019-11-13 PROCEDURE — 99499 UNLISTED E&M SERVICE: CPT | Mod: S$PBB,,, | Performed by: AUDIOLOGIST

## 2019-12-04 ENCOUNTER — CLINICAL SUPPORT (OUTPATIENT)
Dept: AUDIOLOGY | Facility: CLINIC | Age: 79
End: 2019-12-04
Payer: MEDICARE

## 2019-12-04 DIAGNOSIS — H90.3 SENSORINEURAL HEARING LOSS, BILATERAL: Primary | ICD-10-CM

## 2019-12-04 PROCEDURE — 99499 NO LOS: ICD-10-PCS | Mod: S$PBB,,, | Performed by: AUDIOLOGIST

## 2019-12-04 PROCEDURE — 99499 UNLISTED E&M SERVICE: CPT | Mod: S$PBB,,, | Performed by: AUDIOLOGIST

## 2019-12-04 NOTE — PROGRESS NOTES
"Ms. Ellen Freitas was seen today for a hearing aid follow-up for her Resound LinxQuattro 961 Hearing aids. Ms. Ellen Freitas reported that she loves her hearing aids but thinks they are still too loud. After I connected them to the software I noticed the hearing aids were set to NAL-NL2 Prescriptive Formula. I changed her back to Resounds Prescription and she reported a much better sound quality. Lastly we reviewed the Resound Smart 3D quinton and how to use the "Find my Hearing Aid" Feature. Ms. Ellen Freitas will follow-up PRN.   "

## 2020-05-20 ENCOUNTER — LAB VISIT (OUTPATIENT)
Dept: INTERNAL MEDICINE | Facility: CLINIC | Age: 80
End: 2020-05-20
Payer: MEDICARE

## 2020-05-20 DIAGNOSIS — Z20.828 EXPOSURE TO SARS-ASSOCIATED CORONAVIRUS: ICD-10-CM

## 2020-05-20 PROCEDURE — U0003 INFECTIOUS AGENT DETECTION BY NUCLEIC ACID (DNA OR RNA); SEVERE ACUTE RESPIRATORY SYNDROME CORONAVIRUS 2 (SARS-COV-2) (CORONAVIRUS DISEASE [COVID-19]), AMPLIFIED PROBE TECHNIQUE, MAKING USE OF HIGH THROUGHPUT TECHNOLOGIES AS DESCRIBED BY CMS-2020-01-R: HCPCS

## 2020-05-21 LAB — SARS-COV-2 RNA RESP QL NAA+PROBE: NOT DETECTED

## 2020-06-18 ENCOUNTER — HOSPITAL ENCOUNTER (INPATIENT)
Facility: OTHER | Age: 80
LOS: 4 days | Discharge: HOME HEALTH | DRG: 308 | End: 2020-06-22
Attending: EMERGENCY MEDICINE | Admitting: EMERGENCY MEDICINE
Payer: MEDICARE

## 2020-06-18 DIAGNOSIS — R07.2 PRECORDIAL PAIN: ICD-10-CM

## 2020-06-18 DIAGNOSIS — J90 PLEURAL EFFUSION ON RIGHT: ICD-10-CM

## 2020-06-18 DIAGNOSIS — R14.0 ABDOMINAL DISTENSION: ICD-10-CM

## 2020-06-18 DIAGNOSIS — R00.0 TACHYCARDIA: ICD-10-CM

## 2020-06-18 DIAGNOSIS — R79.89 ELEVATED BRAIN NATRIURETIC PEPTIDE (BNP) LEVEL: ICD-10-CM

## 2020-06-18 DIAGNOSIS — I48.91 ATRIAL FIBRILLATION WITH RVR: ICD-10-CM

## 2020-06-18 DIAGNOSIS — R07.9 CHEST PAIN: ICD-10-CM

## 2020-06-18 DIAGNOSIS — I48.91 ATRIAL FIBRILLATION WITH RAPID VENTRICULAR RESPONSE: Primary | ICD-10-CM

## 2020-06-18 DIAGNOSIS — J90 RECURRENT RIGHT PLEURAL EFFUSION: ICD-10-CM

## 2020-06-18 DIAGNOSIS — I10 ESSENTIAL HYPERTENSION: ICD-10-CM

## 2020-06-18 LAB
ALBUMIN SERPL BCP-MCNC: 3.6 G/DL (ref 3.5–5.2)
ALP SERPL-CCNC: 130 U/L (ref 55–135)
ALT SERPL W/O P-5'-P-CCNC: 28 U/L (ref 10–44)
ANION GAP SERPL CALC-SCNC: 14 MMOL/L (ref 8–16)
AST SERPL-CCNC: 37 U/L (ref 10–40)
BASOPHILS # BLD AUTO: 0.06 K/UL (ref 0–0.2)
BASOPHILS NFR BLD: 0.8 % (ref 0–1.9)
BILIRUB SERPL-MCNC: 0.6 MG/DL (ref 0.1–1)
BNP SERPL-MCNC: 366 PG/ML (ref 0–99)
BUN SERPL-MCNC: 12 MG/DL (ref 8–23)
CALCIUM SERPL-MCNC: 9.4 MG/DL (ref 8.7–10.5)
CHLORIDE SERPL-SCNC: 102 MMOL/L (ref 95–110)
CO2 SERPL-SCNC: 20 MMOL/L (ref 23–29)
CREAT SERPL-MCNC: 0.7 MG/DL (ref 0.5–1.4)
DIFFERENTIAL METHOD: ABNORMAL
EOSINOPHIL # BLD AUTO: 0.2 K/UL (ref 0–0.5)
EOSINOPHIL NFR BLD: 2.5 % (ref 0–8)
ERYTHROCYTE [DISTWIDTH] IN BLOOD BY AUTOMATED COUNT: 12.8 % (ref 11.5–14.5)
EST. GFR  (AFRICAN AMERICAN): >60 ML/MIN/1.73 M^2
EST. GFR  (NON AFRICAN AMERICAN): >60 ML/MIN/1.73 M^2
GLUCOSE SERPL-MCNC: 88 MG/DL (ref 70–110)
HCT VFR BLD AUTO: 38.6 % (ref 37–48.5)
HGB BLD-MCNC: 12.1 G/DL (ref 12–16)
IMM GRANULOCYTES # BLD AUTO: 0.02 K/UL (ref 0–0.04)
IMM GRANULOCYTES NFR BLD AUTO: 0.3 % (ref 0–0.5)
LACTATE SERPL-SCNC: 1.2 MMOL/L (ref 0.5–2.2)
LYMPHOCYTES # BLD AUTO: 2 K/UL (ref 1–4.8)
LYMPHOCYTES NFR BLD: 27.1 % (ref 18–48)
MCH RBC QN AUTO: 31.7 PG (ref 27–31)
MCHC RBC AUTO-ENTMCNC: 31.3 G/DL (ref 32–36)
MCV RBC AUTO: 101 FL (ref 82–98)
MONOCYTES # BLD AUTO: 0.7 K/UL (ref 0.3–1)
MONOCYTES NFR BLD: 9.9 % (ref 4–15)
NEUTROPHILS # BLD AUTO: 4.3 K/UL (ref 1.8–7.7)
NEUTROPHILS NFR BLD: 59.4 % (ref 38–73)
NRBC BLD-RTO: 0 /100 WBC
PLATELET # BLD AUTO: 283 K/UL (ref 150–350)
PMV BLD AUTO: 10.2 FL (ref 9.2–12.9)
POTASSIUM SERPL-SCNC: 4.6 MMOL/L (ref 3.5–5.1)
PROCALCITONIN SERPL IA-MCNC: 0.02 NG/ML
PROT SERPL-MCNC: 7.7 G/DL (ref 6–8.4)
RBC # BLD AUTO: 3.82 M/UL (ref 4–5.4)
SARS-COV-2 RDRP RESP QL NAA+PROBE: NEGATIVE
SODIUM SERPL-SCNC: 136 MMOL/L (ref 136–145)
T4 FREE SERPL-MCNC: 0.98 NG/DL (ref 0.71–1.51)
TROPONIN I SERPL DL<=0.01 NG/ML-MCNC: 0.01 NG/ML (ref 0–0.03)
TSH SERPL DL<=0.005 MIU/L-ACNC: 4.77 UIU/ML (ref 0.4–4)
WBC # BLD AUTO: 7.28 K/UL (ref 3.9–12.7)

## 2020-06-18 PROCEDURE — 63600175 PHARM REV CODE 636 W HCPCS: Performed by: HOSPITALIST

## 2020-06-18 PROCEDURE — 96374 THER/PROPH/DIAG INJ IV PUSH: CPT

## 2020-06-18 PROCEDURE — 36415 COLL VENOUS BLD VENIPUNCTURE: CPT

## 2020-06-18 PROCEDURE — 80053 COMPREHEN METABOLIC PANEL: CPT

## 2020-06-18 PROCEDURE — 25000003 PHARM REV CODE 250: Performed by: SURGERY

## 2020-06-18 PROCEDURE — 99223 1ST HOSP IP/OBS HIGH 75: CPT | Mod: ,,, | Performed by: HOSPITALIST

## 2020-06-18 PROCEDURE — 93010 ELECTROCARDIOGRAM REPORT: CPT | Mod: ,,, | Performed by: INTERNAL MEDICINE

## 2020-06-18 PROCEDURE — 27000221 HC OXYGEN, UP TO 24 HOURS

## 2020-06-18 PROCEDURE — 20000000 HC ICU ROOM

## 2020-06-18 PROCEDURE — 84443 ASSAY THYROID STIM HORMONE: CPT

## 2020-06-18 PROCEDURE — 84439 ASSAY OF FREE THYROXINE: CPT

## 2020-06-18 PROCEDURE — 85025 COMPLETE CBC W/AUTO DIFF WBC: CPT

## 2020-06-18 PROCEDURE — 99223 PR INITIAL HOSPITAL CARE,LEVL III: ICD-10-PCS | Mod: ,,, | Performed by: HOSPITALIST

## 2020-06-18 PROCEDURE — 25000003 PHARM REV CODE 250: Performed by: EMERGENCY MEDICINE

## 2020-06-18 PROCEDURE — 83880 ASSAY OF NATRIURETIC PEPTIDE: CPT

## 2020-06-18 PROCEDURE — U0002 COVID-19 LAB TEST NON-CDC: HCPCS

## 2020-06-18 PROCEDURE — 99285 EMERGENCY DEPT VISIT HI MDM: CPT | Mod: 25

## 2020-06-18 PROCEDURE — 84484 ASSAY OF TROPONIN QUANT: CPT

## 2020-06-18 PROCEDURE — 25000003 PHARM REV CODE 250: Performed by: INTERNAL MEDICINE

## 2020-06-18 PROCEDURE — 96376 TX/PRO/DX INJ SAME DRUG ADON: CPT

## 2020-06-18 PROCEDURE — 93005 ELECTROCARDIOGRAM TRACING: CPT

## 2020-06-18 PROCEDURE — 51701 INSERT BLADDER CATHETER: CPT

## 2020-06-18 PROCEDURE — 99223 1ST HOSP IP/OBS HIGH 75: CPT | Mod: 25,,, | Performed by: INTERNAL MEDICINE

## 2020-06-18 PROCEDURE — 96375 TX/PRO/DX INJ NEW DRUG ADDON: CPT

## 2020-06-18 PROCEDURE — 84145 PROCALCITONIN (PCT): CPT

## 2020-06-18 PROCEDURE — 93010 EKG 12-LEAD: ICD-10-PCS | Mod: ,,, | Performed by: INTERNAL MEDICINE

## 2020-06-18 PROCEDURE — 99223 PR INITIAL HOSPITAL CARE,LEVL III: ICD-10-PCS | Mod: 25,,, | Performed by: INTERNAL MEDICINE

## 2020-06-18 PROCEDURE — 83605 ASSAY OF LACTIC ACID: CPT

## 2020-06-18 PROCEDURE — 94761 N-INVAS EAR/PLS OXIMETRY MLT: CPT

## 2020-06-18 PROCEDURE — 25000003 PHARM REV CODE 250: Performed by: HOSPITALIST

## 2020-06-18 RX ORDER — ENOXAPARIN SODIUM 100 MG/ML
60 INJECTION SUBCUTANEOUS
Status: DISCONTINUED | OUTPATIENT
Start: 2020-06-18 | End: 2020-06-18

## 2020-06-18 RX ORDER — TRAMADOL HYDROCHLORIDE 50 MG/1
50 TABLET ORAL EVERY 6 HOURS PRN
Status: DISCONTINUED | OUTPATIENT
Start: 2020-06-18 | End: 2020-06-22 | Stop reason: HOSPADM

## 2020-06-18 RX ORDER — DILTIAZEM HYDROCHLORIDE 5 MG/ML
10 INJECTION INTRAVENOUS
Status: COMPLETED | OUTPATIENT
Start: 2020-06-18 | End: 2020-06-18

## 2020-06-18 RX ORDER — DILTIAZEM HYDROCHLORIDE 5 MG/ML
5 INJECTION INTRAVENOUS
Status: COMPLETED | OUTPATIENT
Start: 2020-06-18 | End: 2020-06-18

## 2020-06-18 RX ORDER — DILTIAZEM HCL 1 MG/ML
5 INJECTION, SOLUTION INTRAVENOUS CONTINUOUS
Status: DISCONTINUED | OUTPATIENT
Start: 2020-06-18 | End: 2020-06-19

## 2020-06-18 RX ORDER — ACETAMINOPHEN 500 MG
1000 TABLET ORAL EVERY 6 HOURS PRN
Status: DISCONTINUED | OUTPATIENT
Start: 2020-06-18 | End: 2020-06-18

## 2020-06-18 RX ORDER — LISINOPRIL 10 MG/1
40 TABLET ORAL DAILY
Status: DISCONTINUED | OUTPATIENT
Start: 2020-06-19 | End: 2020-06-19

## 2020-06-18 RX ORDER — FUROSEMIDE 10 MG/ML
20 INJECTION INTRAMUSCULAR; INTRAVENOUS
Status: DISCONTINUED | OUTPATIENT
Start: 2020-06-19 | End: 2020-06-20

## 2020-06-18 RX ORDER — ONDANSETRON 2 MG/ML
4 INJECTION INTRAMUSCULAR; INTRAVENOUS EVERY 8 HOURS PRN
Status: DISCONTINUED | OUTPATIENT
Start: 2020-06-18 | End: 2020-06-22 | Stop reason: HOSPADM

## 2020-06-18 RX ORDER — ZOLPIDEM TARTRATE 5 MG/1
10 TABLET ORAL NIGHTLY PRN
Status: DISCONTINUED | OUTPATIENT
Start: 2020-06-18 | End: 2020-06-20

## 2020-06-18 RX ORDER — ACETAMINOPHEN 500 MG
1000 TABLET ORAL EVERY 6 HOURS PRN
Status: DISCONTINUED | OUTPATIENT
Start: 2020-06-18 | End: 2020-06-22 | Stop reason: HOSPADM

## 2020-06-18 RX ORDER — FUROSEMIDE 10 MG/ML
40 INJECTION INTRAMUSCULAR; INTRAVENOUS ONCE
Status: COMPLETED | OUTPATIENT
Start: 2020-06-18 | End: 2020-06-18

## 2020-06-18 RX ORDER — SODIUM CHLORIDE 0.9 % (FLUSH) 0.9 %
10 SYRINGE (ML) INJECTION
Status: DISCONTINUED | OUTPATIENT
Start: 2020-06-18 | End: 2020-06-22 | Stop reason: HOSPADM

## 2020-06-18 RX ORDER — METOPROLOL TARTRATE 50 MG/1
50 TABLET ORAL 4 TIMES DAILY
Status: DISCONTINUED | OUTPATIENT
Start: 2020-06-18 | End: 2020-06-19

## 2020-06-18 RX ORDER — METOPROLOL TARTRATE 1 MG/ML
5 INJECTION, SOLUTION INTRAVENOUS
Status: COMPLETED | OUTPATIENT
Start: 2020-06-18 | End: 2020-06-18

## 2020-06-18 RX ORDER — POLYETHYLENE GLYCOL 3350 17 G/17G
17 POWDER, FOR SOLUTION ORAL DAILY
Status: DISCONTINUED | OUTPATIENT
Start: 2020-06-18 | End: 2020-06-22 | Stop reason: HOSPADM

## 2020-06-18 RX ORDER — DILTIAZEM HCL 1 MG/ML
5 INJECTION, SOLUTION INTRAVENOUS
Status: COMPLETED | OUTPATIENT
Start: 2020-06-18 | End: 2020-06-18

## 2020-06-18 RX ORDER — CITALOPRAM 20 MG/1
20 TABLET, FILM COATED ORAL DAILY
Status: DISCONTINUED | OUTPATIENT
Start: 2020-06-19 | End: 2020-06-22 | Stop reason: HOSPADM

## 2020-06-18 RX ADMIN — METOPROLOL TARTRATE 5 MG: 1 INJECTION, SOLUTION INTRAVENOUS at 02:06

## 2020-06-18 RX ADMIN — ENOXAPARIN SODIUM 60 MG: 80 INJECTION SUBCUTANEOUS at 05:06

## 2020-06-18 RX ADMIN — ZOLPIDEM TARTRATE 10 MG: 5 TABLET ORAL at 10:06

## 2020-06-18 RX ADMIN — TRAMADOL HYDROCHLORIDE 50 MG: 50 TABLET, FILM COATED ORAL at 09:06

## 2020-06-18 RX ADMIN — ACETAMINOPHEN 1000 MG: 500 TABLET ORAL at 08:06

## 2020-06-18 RX ADMIN — METOPROLOL TARTRATE 50 MG: 50 TABLET, FILM COATED ORAL at 09:06

## 2020-06-18 RX ADMIN — POLYETHYLENE GLYCOL 3350 17 G: 17 POWDER, FOR SOLUTION ORAL at 05:06

## 2020-06-18 RX ADMIN — DILTIAZEM HYDROCHLORIDE 5 MG: 5 INJECTION INTRAVENOUS at 03:06

## 2020-06-18 RX ADMIN — DILTIAZEM HYDROCHLORIDE 5 MG/HR: 5 INJECTION INTRAVENOUS at 04:06

## 2020-06-18 RX ADMIN — DILTIAZEM HYDROCHLORIDE 5 MG/HR: 5 INJECTION INTRAVENOUS at 05:06

## 2020-06-18 RX ADMIN — DILTIAZEM HYDROCHLORIDE 10 MG: 5 INJECTION INTRAVENOUS at 04:06

## 2020-06-18 RX ADMIN — FUROSEMIDE 40 MG: 10 INJECTION, SOLUTION INTRAMUSCULAR; INTRAVENOUS at 05:06

## 2020-06-18 NOTE — HPI
Patient is 80-year-old woman with longstanding history of hypertension who was sent to the emergency department by her primary care provider Dr. Abran Terrazas due to dyspnea with new onset of atrial fibrillation rapid ventricular response.  Patient reports that she has had progressive shortness of breath for the last 2 days but notice change initially about 2 weeks ago when she had some shortness of breath at that time.  She denies any chest pain.  She denies any fevers or chills.  She reports noticing progressively worsening abdominal distention but denies any abdominal pain, nausea, or vomiting.  She reports she has chronic constipation has not had a bowel movement since Monday.  She reports smoking tobacco in college but otherwise been tobacco free.  She does drink about 3 drinks of wine per day.  Her family reports that this is a conservative estimate.  She denies any history of alcohol withdrawal.

## 2020-06-18 NOTE — PLAN OF CARE
1715: Pt arrived to ICU on 2L nasal cannula. C/o SOB on exertion. Afib on diltiazem. Cardiac consult called in to Dr. Mckeon.

## 2020-06-18 NOTE — ED NOTES
Pt requesting to use restroom at this time, refusing to use BS commode. Pt ambulatory to restroom with steady gait.

## 2020-06-18 NOTE — ED PROVIDER NOTES
Encounter Date: 6/18/2020    SCRIBE #1 NOTE: Lissy FENTON , am scribing for, and in the presence of, Dr. Sarmiento.       History     Chief Complaint   Patient presents with    Shortness of Breath     pt sent over from dr office with new onset a fib.     Time seen by provider: 2:09 PM    This is a 80 y.o. female, with a hx of HTN, who presents with complaint of shortness of breath. Pt was sent from her PCP due to new onset of atrial fibrillation. She states she haf about 1/4 cup of coffee this morning which is less than usual. She reports associated sore throat, a mild cough, scant urine, and chronic constipation. She denies chest pain or dysuria. She denies experiencing this previously.    The history is provided by the patient and medical records.     Review of patient's allergies indicates:   Allergen Reactions    No known drug allergies      Past Medical History:   Diagnosis Date    Anemia     Cataract     GERD (gastroesophageal reflux disease)     Hypertension     Macular degeneration     Migraine      Past Surgical History:   Procedure Laterality Date    APPENDECTOMY      BREAST BIOPSY Left 03/22/2019    EXCISIONAL BX    BREAST SURGERY      biopsy    BUNIONECTOMY      COSMETIC SURGERY      facelift    EYE SURGERY      bilateral cataracts     Family History   Problem Relation Age of Onset    Hypertension Mother     Heart disease Mother         MI at 84    Cancer Father         stomach cancer    Diabetes Brother     Hypertension Brother     Cancer Brother     Hypertension Brother     Cancer Brother         lung cancer  smoker     Social History     Tobacco Use    Smoking status: Never Smoker    Smokeless tobacco: Never Used   Substance Use Topics    Alcohol use: Yes    Drug use: No     Review of Systems   Constitutional: Negative for chills and fever.   HENT: Positive for sore throat. Negative for congestion.    Eyes: Negative for photophobia and redness.   Respiratory: Positive  for cough and shortness of breath.    Cardiovascular: Positive for palpitations. Negative for chest pain.   Gastrointestinal: Negative for abdominal pain, nausea and vomiting.   Genitourinary: Negative for dysuria.   Musculoskeletal: Negative for back pain.   Skin: Negative for rash.   Neurological: Negative for weakness, light-headedness and headaches.   Psychiatric/Behavioral: Negative for confusion.       Physical Exam     Initial Vitals   BP Pulse Resp Temp SpO2   06/18/20 1356 06/18/20 1356 06/18/20 1356 06/18/20 1715 06/18/20 1356   (!) 158/96 (!) 129 18 98.1 °F (36.7 °C) 95 %      MAP       --                Physical Exam    Nursing note and vitals reviewed.  Constitutional: She appears well-developed and well-nourished. She is not diaphoretic. No distress.   HENT:   Head: Normocephalic and atraumatic.   Mouth/Throat: Oropharynx is clear and moist and mucous membranes are normal.   Mucous membranes are moist. TMs clear and intact bilaterally.    Eyes: Conjunctivae and EOM are normal. Pupils are equal, round, and reactive to light. No scleral icterus.   Conjunctivae are pink, clear, and intact.    Neck: Normal range of motion. Neck supple.   Cardiovascular: Normal rate, S1 normal, S2 normal and normal heart sounds. An irregularly irregular rhythm present.  Exam reveals no gallop and no friction rub.    No murmur heard.  Pulmonary/Chest: Breath sounds normal. No respiratory distress. She has no wheezes. She has no rhonchi. She has no rales.   Lungs clear to auscultation bilaterally.    Abdominal: Soft. Bowel sounds are normal. She exhibits distension. There is no abdominal tenderness. There is no rebound and no guarding.   No audible bruits.    Musculoskeletal: Normal range of motion. No tenderness or edema.      Comments: No lower extremity edema.    Lymphadenopathy:     She has no cervical adenopathy.   Neurological: She is alert and oriented to person, place, and time.   Skin: Skin is warm and dry. Capillary  refill takes less than 2 seconds. No rash noted. No pallor.   No skin tenting. No lesions.   Psychiatric: She has a normal mood and affect.         ED Course   Critical Care    Date/Time: 6/18/2020 2:09 PM  Performed by: Mark Anthony Butt MD  Authorized by: Mark Anthony Butt MD   Direct patient critical care time: 45 minutes  Additional history critical care time: 10 minutes  Ordering / reviewing critical care time: 12 minutes  Documentation critical care time: 10 minutes  Consulting other physicians critical care time: 8 minutes  Total critical care time (exclusive of procedural time) : 85 minutes  Critical care time was exclusive of separately billable procedures and treating other patients.  Critical care was necessary to treat or prevent imminent or life-threatening deterioration of the following conditions: Atrial fibrillation with rapid ventricular response.  Critical care was time spent personally by me on the following activities: blood draw for specimens, development of treatment plan with patient or surrogate, discussions with consultants, interpretation of cardiac output measurements, examination of patient, evaluation of patient's response to treatment, obtaining history from patient or surrogate, ordering and performing treatments and interventions, ordering and review of radiographic studies, ordering and review of laboratory studies, pulse oximetry, re-evaluation of patient's condition and review of old charts.        Labs Reviewed   CBC W/ AUTO DIFFERENTIAL - Abnormal; Notable for the following components:       Result Value    RBC 3.82 (*)     Mean Corpuscular Volume 101 (*)     Mean Corpuscular Hemoglobin 31.7 (*)     Mean Corpuscular Hemoglobin Conc 31.3 (*)     All other components within normal limits   B-TYPE NATRIURETIC PEPTIDE - Abnormal; Notable for the following components:     (*)     All other components within normal limits   TSH - Abnormal; Notable for the following components:     TSH 4.771 (*)     All other components within normal limits   COMPREHENSIVE METABOLIC PANEL - Abnormal; Notable for the following components:    CO2 20 (*)     All other components within normal limits   SARS-COV-2 RNA AMPLIFICATION, QUAL   TROPONIN I   T4, FREE     EKG Readings: (Independently Interpreted)   Rapid Ventricular Fibrillation at a rate of 129. No acute ST or T wave changes. No STEMI.     ECG Results          EKG 12-lead (Final result)  Result time 06/18/20 18:07:30    Final result by Interface, Lab In [facility] (06/18/20 18:07:30)                 Narrative:    Test Reason : R00.0,    Vent. Rate : 129 BPM     Atrial Rate : 166 BPM     P-R Int : 000 ms          QRS Dur : 080 ms      QT Int : 330 ms       P-R-T Axes : 000 103 -85 degrees     QTc Int : 483 ms    Atrial fibrillation with rapid ventricular response  Rightward axis  Nonspecific ST and T wave abnormality  Abnormal ECG    Confirmed by Donnell Mckeon MD ([id number]) on 6/18/2020 6:07:19 PM    Referred By: [email or web address]   SELF           Confirmed By:Donnell Mckeon MD                             EKG 12-LEAD (Final result)  Result time 06/20/20 12:01:51    Final result by Unknown User (06/20/20 12:01:51)                                Imaging Results          X-Ray Chest AP Portable (Final result)  Result time 06/18/20 15:09:57    Final result by Sulaiman Gomez MD (06/18/20 15:09:57)                 Impression:      Abnormal findings within the right lower thorax consistent with effusion/infiltrate along with cardiomegaly.      Electronically signed by: Sulaiman Gomez MD  Date:    06/18/2020  Time:    15:09             Narrative:    EXAMINATION:  XR CHEST AP PORTABLE    CLINICAL HISTORY:  Chest Pain;    TECHNIQUE:  Single frontal view of the chest was performed.    COMPARISON:  Prior studies dated 23 March 2012, 13 May 2009 and 25 March 2009    FINDINGS:  Monitor leads and wires are in place.  There is added attenuation within the right lower lung  "obscuring the right heart border and the hemidiaphragms with blunted appearance of the right CP angle.  Underlying mass or nodule cannot be excluded.    There is also prominent appearance of the cardiac silhouette with no hilar enlargement demonstrated, the lungs otherwise appearing expanded and clear and with the left hemidiaphragm appearing sharply outlined.  The left CP angle is also acute on this view.                               X-Ray Abdomen Flat And Erect (Final result)  Result time 06/18/20 15:08:10    Final result by Sulaiman Gomez MD (06/18/20 15:08:10)                 Impression:      Findings within the included right lower thorax suggestive of effusion and postinflammatory changes as well as cardiomegaly.  Incidental osseous findings with otherwise flat and erect views of the abdomen.      Electronically signed by: Sulaiman Gomez MD  Date:    06/18/2020  Time:    15:08             Narrative:    EXAMINATION:  XR ABDOMEN FLAT AND ERECT    CLINICAL HISTORY:  Abdominal distension (gaseous)    TECHNIQUE:  Flat and erect AP views of the abdomen were performed.    COMPARISON:  No prior studies are available.    FINDINGS:  Monitor leads and wires are in place.  Within the included lower thorax, the right hemidiaphragm appears obscured and there is attenuation or "blunting" of the right costophrenic angle.  There is also prominent appearance of the cardiac silhouette.    There is an overall nonobstructive bowel gas pattern and no abnormal air-fluid levels are demonstrated on the erect view that was obtained.    Degenerative findings are demonstrated within the lower lumbar region and there are costochondral calcifications as well as mild-to-moderate degenerative findings of the hips greater on the right                              X-Rays:   Independently Interpreted Readings:   Chest X-Ray: Effusion/ infiltrates vs. Mass in the right lower lung.   Abdomen: Blunting of the right costophrenic angle. No free " air under diaphragm. No air fluid levels.     Medical Decision Making:   Independently Interpreted Test(s):   I have ordered and independently interpreted X-rays - see prior notes.  I have ordered and independently interpreted EKG Reading(s) - see prior notes  Clinical Tests:   Lab Tests: Ordered and Reviewed  Radiological Study: Ordered and Reviewed  Medical Tests: Ordered and Reviewed            Scribe Attestation:   Scribe #1: I performed the above scribed service and the documentation accurately describes the services I performed. I attest to the accuracy of the note.    Attending Attestation:           Physician Attestation for Scribe:  Physician Attestation Statement for Scribe #1: I, Dr. Sarmiento, reviewed documentation, as scribed by Lissy Mace in my presence, and it is both accurate and complete.         Attending ED Notes:   Emergent evaluation of 80-year-old female who presents to the ED with chief complaint of shortness of breath with associated palpitations.  Patient is afebrile, tachycardic with elevation of blood pressure.  EKG reveals atrial fibrillation with rapid ventricular response.  I was unable to control the rate with 2 doses of Lopressor 5 mg IV.  Patient was then administered Cardizem IV which helped to control the rate.  Patient was then placed on a Cardizem drip and admitted to the ICU.  Patient has no elevation white blood cell count.  H&H 12.1 and 38.6.  COVID screen is negative.  No acute findings on CMP.  TSH is 4.771.  Free T4 is within normal limits. BNP is 366.  Chest x-ray reveals right-sided pleural effusion versus infiltrate.  No acute findings on x-ray of the abdomen.  The patient is extensively counseled on her diagnosis and treatment including all diagnostic, laboratory and physical exam findings.  The patient is admitted in stable condition.          ED Course as of Jun 20 1614   Thu Jun 18, 2020   1551 Case discussed with hospitalist. Will admit to Dr. Jimenez.    [DM]       ED Course User Index  [DM] Deandrane Major                Clinical Impression:     1. Recurrent right pleural effusion    2. Tachycardia    3. Abdominal distension    4. Atrial fibrillation with RVR    5. Elevated brain natriuretic peptide (BNP) level    6. Atrial fibrillation with rapid ventricular response    7. Chest pain                ED Disposition Condition    Admit                           Mark Anthony Butt MD  06/20/20 [id number]

## 2020-06-18 NOTE — ED TRIAGE NOTES
Pt presents to ed c/o Sob for the past few days with Afib. She states being sent here by her PCP. She denies any current chest pain and states that she never had Afib before. The pt denies any n/v/d, fever, chills, HA. Pt Awake and alert, answering questions appropriately.

## 2020-06-19 LAB
ALBUMIN SERPL BCP-MCNC: 3.3 G/DL (ref 3.5–5.2)
ALP SERPL-CCNC: 116 U/L (ref 55–135)
ALT SERPL W/O P-5'-P-CCNC: 24 U/L (ref 10–44)
ANION GAP SERPL CALC-SCNC: 15 MMOL/L (ref 8–16)
APTT BLDCRRT: 35 SEC (ref 21–32)
AST SERPL-CCNC: 33 U/L (ref 10–40)
AV INDEX (PROSTH): 0.64
AV MEAN GRADIENT: 4 MMHG
AV PEAK GRADIENT: 7 MMHG
AV VALVE AREA: 1.73 CM2
AV VELOCITY RATIO: 0.64
BASOPHILS # BLD AUTO: 0.06 K/UL (ref 0–0.2)
BASOPHILS NFR BLD: 1 % (ref 0–1.9)
BILIRUB SERPL-MCNC: 0.6 MG/DL (ref 0.1–1)
BSA FOR ECHO PROCEDURE: 1.71 M2
BUN SERPL-MCNC: 15 MG/DL (ref 8–23)
CALCIUM SERPL-MCNC: 9.3 MG/DL (ref 8.7–10.5)
CHLORIDE SERPL-SCNC: 100 MMOL/L (ref 95–110)
CO2 SERPL-SCNC: 19 MMOL/L (ref 23–29)
CREAT SERPL-MCNC: 0.7 MG/DL (ref 0.5–1.4)
CV ECHO LV RWT: 0.43 CM
DIFFERENTIAL METHOD: ABNORMAL
DOP CALC AO PEAK VEL: 1.31 M/S
DOP CALC AO VTI: 26.03 CM
DOP CALC LVOT AREA: 2.7 CM2
DOP CALC LVOT DIAMETER: 1.86 CM
DOP CALC LVOT PEAK VEL: 0.84 M/S
DOP CALC LVOT STROKE VOLUME: 45.08 CM3
DOP CALCLVOT PEAK VEL VTI: 16.6 CM
E WAVE DECELERATION TIME: 108.52 MSEC
E/A RATIO: 2.02
ECHO LV POSTERIOR WALL: 0.86 CM (ref 0.6–1.1)
EOSINOPHIL # BLD AUTO: 0.2 K/UL (ref 0–0.5)
EOSINOPHIL NFR BLD: 3.4 % (ref 0–8)
ERYTHROCYTE [DISTWIDTH] IN BLOOD BY AUTOMATED COUNT: 12.5 % (ref 11.5–14.5)
EST. GFR  (AFRICAN AMERICAN): >60 ML/MIN/1.73 M^2
EST. GFR  (NON AFRICAN AMERICAN): >60 ML/MIN/1.73 M^2
FRACTIONAL SHORTENING: 20 % (ref 28–44)
GLUCOSE SERPL-MCNC: 68 MG/DL (ref 70–110)
HCT VFR BLD AUTO: 34.7 % (ref 37–48.5)
HGB BLD-MCNC: 11 G/DL (ref 12–16)
IMM GRANULOCYTES # BLD AUTO: 0.02 K/UL (ref 0–0.04)
IMM GRANULOCYTES NFR BLD AUTO: 0.3 % (ref 0–0.5)
INR PPP: 1.1 (ref 0.8–1.2)
INTERVENTRICULAR SEPTUM: 0.9 CM (ref 0.6–1.1)
IVRT: 60.89 MSEC
LA MAJOR: 6.16 CM
LA MINOR: 6.52 CM
LA WIDTH: 4.21 CM
LEFT ATRIUM SIZE: 4.11 CM
LEFT ATRIUM VOLUME INDEX MOD: 61.5 ML/M2
LEFT ATRIUM VOLUME INDEX: 55.1 ML/M2
LEFT ATRIUM VOLUME MOD: 104 CM3
LEFT ATRIUM VOLUME: 93.17 CM3
LEFT INTERNAL DIMENSION IN SYSTOLE: 3.18 CM (ref 2.1–4)
LEFT VENTRICLE DIASTOLIC VOLUME INDEX: 41.18 ML/M2
LEFT VENTRICLE DIASTOLIC VOLUME: 69.64 ML
LEFT VENTRICLE MASS INDEX: 63 G/M2
LEFT VENTRICLE SYSTOLIC VOLUME INDEX: 23.8 ML/M2
LEFT VENTRICLE SYSTOLIC VOLUME: 40.29 ML
LEFT VENTRICULAR INTERNAL DIMENSION IN DIASTOLE: 3.99 CM (ref 3.5–6)
LEFT VENTRICULAR MASS: 105.92 G
LYMPHOCYTES # BLD AUTO: 2.5 K/UL (ref 1–4.8)
LYMPHOCYTES NFR BLD: 41.7 % (ref 18–48)
MAGNESIUM SERPL-MCNC: 1.6 MG/DL (ref 1.6–2.6)
MCH RBC QN AUTO: 31.6 PG (ref 27–31)
MCHC RBC AUTO-ENTMCNC: 31.7 G/DL (ref 32–36)
MCV RBC AUTO: 100 FL (ref 82–98)
MONOCYTES # BLD AUTO: 0.8 K/UL (ref 0.3–1)
MONOCYTES NFR BLD: 12.7 % (ref 4–15)
MV PEAK A VEL: 0.57 M/S
MV PEAK E VEL: 1.15 M/S
MV STENOSIS PRESSURE HALF TIME: 31.47 MS
MV VALVE AREA P 1/2 METHOD: 6.99 CM2
NEUTROPHILS # BLD AUTO: 2.4 K/UL (ref 1.8–7.7)
NEUTROPHILS NFR BLD: 40.9 % (ref 38–73)
NRBC BLD-RTO: 0 /100 WBC
PHOSPHATE SERPL-MCNC: 3.2 MG/DL (ref 2.7–4.5)
PISA MRMAX VEL: 0.05 M/S
PISA TR MAX VEL: 3.45 M/S
PLATELET # BLD AUTO: 256 K/UL (ref 150–350)
PMV BLD AUTO: 9.8 FL (ref 9.2–12.9)
POCT GLUCOSE: 66 MG/DL (ref 70–110)
POCT GLUCOSE: 80 MG/DL (ref 70–110)
POTASSIUM SERPL-SCNC: 4.2 MMOL/L (ref 3.5–5.1)
PROT SERPL-MCNC: 7.1 G/DL (ref 6–8.4)
PROTHROMBIN TIME: 11.9 SEC (ref 9–12.5)
PULM VEIN S/D RATIO: 2.08
PV PEAK D VEL: 0.25 M/S
PV PEAK S VEL: 0.52 M/S
PV PEAK VELOCITY: 0.81 CM/S
RA MAJOR: 6.14 CM
RA PRESSURE: 8 MMHG
RA WIDTH: 3.96 CM
RBC # BLD AUTO: 3.48 M/UL (ref 4–5.4)
SINUS: 2.57 CM
SODIUM SERPL-SCNC: 134 MMOL/L (ref 136–145)
STJ: 2.56 CM
TR MAX PG: 48 MMHG
TRICUSPID ANNULAR PLANE SYSTOLIC EXCURSION: 1.51 CM
TROPONIN I SERPL DL<=0.01 NG/ML-MCNC: <0.006 NG/ML (ref 0–0.03)
TV REST PULMONARY ARTERY PRESSURE: 56 MMHG
WBC # BLD AUTO: 5.9 K/UL (ref 3.9–12.7)

## 2020-06-19 PROCEDURE — 25000003 PHARM REV CODE 250: Performed by: SURGERY

## 2020-06-19 PROCEDURE — 25000003 PHARM REV CODE 250: Performed by: HOSPITALIST

## 2020-06-19 PROCEDURE — 80053 COMPREHEN METABOLIC PANEL: CPT

## 2020-06-19 PROCEDURE — 99233 PR SUBSEQUENT HOSPITAL CARE,LEVL III: ICD-10-PCS | Mod: ,,, | Performed by: HOSPITALIST

## 2020-06-19 PROCEDURE — 85610 PROTHROMBIN TIME: CPT

## 2020-06-19 PROCEDURE — 63600175 PHARM REV CODE 636 W HCPCS: Performed by: INTERNAL MEDICINE

## 2020-06-19 PROCEDURE — 99233 SBSQ HOSP IP/OBS HIGH 50: CPT | Mod: 25,,, | Performed by: INTERNAL MEDICINE

## 2020-06-19 PROCEDURE — 99233 SBSQ HOSP IP/OBS HIGH 50: CPT | Mod: ,,, | Performed by: HOSPITALIST

## 2020-06-19 PROCEDURE — 94761 N-INVAS EAR/PLS OXIMETRY MLT: CPT

## 2020-06-19 PROCEDURE — 85730 THROMBOPLASTIN TIME PARTIAL: CPT

## 2020-06-19 PROCEDURE — 99233 PR SUBSEQUENT HOSPITAL CARE,LEVL III: ICD-10-PCS | Mod: 25,,, | Performed by: INTERNAL MEDICINE

## 2020-06-19 PROCEDURE — 83735 ASSAY OF MAGNESIUM: CPT

## 2020-06-19 PROCEDURE — 25000003 PHARM REV CODE 250: Performed by: INTERNAL MEDICINE

## 2020-06-19 PROCEDURE — 20000000 HC ICU ROOM

## 2020-06-19 PROCEDURE — 84484 ASSAY OF TROPONIN QUANT: CPT

## 2020-06-19 PROCEDURE — 85025 COMPLETE CBC W/AUTO DIFF WBC: CPT

## 2020-06-19 PROCEDURE — 84100 ASSAY OF PHOSPHORUS: CPT

## 2020-06-19 PROCEDURE — 36415 COLL VENOUS BLD VENIPUNCTURE: CPT

## 2020-06-19 RX ORDER — POTASSIUM CHLORIDE 750 MG/1
10 TABLET, EXTENDED RELEASE ORAL 2 TIMES DAILY
Status: COMPLETED | OUTPATIENT
Start: 2020-06-19 | End: 2020-06-20

## 2020-06-19 RX ORDER — LISINOPRIL 10 MG/1
10 TABLET ORAL DAILY
Status: DISCONTINUED | OUTPATIENT
Start: 2020-06-19 | End: 2020-06-20

## 2020-06-19 RX ORDER — DILTIAZEM HYDROCHLORIDE 5 MG/ML
5 INJECTION INTRAVENOUS
Status: DISCONTINUED | OUTPATIENT
Start: 2020-06-19 | End: 2020-06-22 | Stop reason: HOSPADM

## 2020-06-19 RX ORDER — DIGOXIN 0.25 MG/ML
250 INJECTION INTRAMUSCULAR; INTRAVENOUS ONCE
Status: COMPLETED | OUTPATIENT
Start: 2020-06-20 | End: 2020-06-20

## 2020-06-19 RX ORDER — METOPROLOL TARTRATE 50 MG/1
100 TABLET ORAL 2 TIMES DAILY
Status: DISCONTINUED | OUTPATIENT
Start: 2020-06-19 | End: 2020-06-22 | Stop reason: HOSPADM

## 2020-06-19 RX ORDER — DIGOXIN 0.25 MG/ML
250 INJECTION INTRAMUSCULAR; INTRAVENOUS ONCE
Status: COMPLETED | OUTPATIENT
Start: 2020-06-19 | End: 2020-06-19

## 2020-06-19 RX ADMIN — METOPROLOL TARTRATE 100 MG: 50 TABLET, FILM COATED ORAL at 08:06

## 2020-06-19 RX ADMIN — DILTIAZEM HYDROCHLORIDE 5 MG: 5 INJECTION INTRAVENOUS at 05:06

## 2020-06-19 RX ADMIN — ACETAMINOPHEN 1000 MG: 500 TABLET ORAL at 07:06

## 2020-06-19 RX ADMIN — DIGOXIN 250 MCG: 0.25 INJECTION INTRAMUSCULAR; INTRAVENOUS at 10:06

## 2020-06-19 RX ADMIN — DILTIAZEM HYDROCHLORIDE 5 MG: 5 INJECTION INTRAVENOUS at 11:06

## 2020-06-19 RX ADMIN — CITALOPRAM HYDROBROMIDE 20 MG: 20 TABLET ORAL at 08:06

## 2020-06-19 RX ADMIN — FUROSEMIDE 20 MG: 10 INJECTION, SOLUTION INTRAMUSCULAR; INTRAVENOUS at 08:06

## 2020-06-19 RX ADMIN — TRAMADOL HYDROCHLORIDE 50 MG: 50 TABLET, FILM COATED ORAL at 02:06

## 2020-06-19 RX ADMIN — POTASSIUM CHLORIDE 10 MEQ: 750 TABLET, EXTENDED RELEASE ORAL at 08:06

## 2020-06-19 RX ADMIN — APIXABAN 5 MG: 2.5 TABLET, FILM COATED ORAL at 05:06

## 2020-06-19 RX ADMIN — POLYETHYLENE GLYCOL 3350 17 G: 17 POWDER, FOR SOLUTION ORAL at 08:06

## 2020-06-19 RX ADMIN — DILTIAZEM HYDROCHLORIDE 5 MG: 5 INJECTION INTRAVENOUS at 02:06

## 2020-06-19 RX ADMIN — APIXABAN 5 MG: 2.5 TABLET, FILM COATED ORAL at 08:06

## 2020-06-19 RX ADMIN — DILTIAZEM HYDROCHLORIDE 5 MG: 5 INJECTION INTRAVENOUS at 07:06

## 2020-06-19 RX ADMIN — ZOLPIDEM TARTRATE 10 MG: 5 TABLET ORAL at 10:06

## 2020-06-19 RX ADMIN — TRAMADOL HYDROCHLORIDE 50 MG: 50 TABLET, FILM COATED ORAL at 09:06

## 2020-06-19 RX ADMIN — ACETAMINOPHEN 1000 MG: 500 TABLET ORAL at 09:06

## 2020-06-19 RX ADMIN — DILTIAZEM HYDROCHLORIDE 5 MG: 5 INJECTION INTRAVENOUS at 09:06

## 2020-06-19 RX ADMIN — TRAMADOL HYDROCHLORIDE 50 MG: 50 TABLET, FILM COATED ORAL at 08:06

## 2020-06-19 RX ADMIN — ACETAMINOPHEN 1000 MG: 500 TABLET ORAL at 02:06

## 2020-06-19 RX ADMIN — LISINOPRIL 10 MG: 10 TABLET ORAL at 08:06

## 2020-06-19 NOTE — ASSESSMENT & PLAN NOTE
Mild distention but soft and not distended.  No nausea or vomiting.  No abdominal pain.  Possible fluid retention due to heart failure.  She also reports chronic long-standing history of constipation and reports no bowel movement since Monday.  Will start bowel regimen to relief constipation and continue to monitor for changes in her abdominal exam.

## 2020-06-19 NOTE — CONSULTS
[facility]  Cardiology  Consult Note    Patient Name: Ellen Neves  MRN: [id number]  Admission Date: 6/18/2020  Hospital Length of Stay: 0 days  Code Status: Full Code   Attending Provider: Juan Jimenez MD   Consulting Provider: Donnell Mckeon MD  Primary Care Physician: Abran Terrazas MD  Principal Problem:Atrial fibrillation with rapid ventricular response    Patient information was obtained from patient, past medical records and ER records.     Consults  Subjective:     Chief Complaint:  Shortness of breath.     HPI:     Ellen Neves is a 80 y.o.female with hypertension. On about 5/20/2020 she had nausea and vomited. She felt weak. At that time she also noted that her heart rate had become elevated. She mostly recorded ar heart rate 100-120 bpm. She continued to record an elevated heart rate over the coming weeks. On about 6/15/2020 she felt increasingly weak and began being short of breath. She noted her abdomen had become mildly distended. She saw Dr. Abran Terrazas on 6/18/2020 and was referred to the ER at [facility] for admission. She was in atrial fibrillation with a VRR of about 150 bpm on presentation. She denies any chest pain.        Past Medical History:   Diagnosis Date    Anemia     Cataract     GERD (gastroesophageal reflux disease)     Hypertension     Macular degeneration     Migraine        Past Surgical History:   Procedure Laterality Date    APPENDECTOMY      BREAST BIOPSY Left 03/22/2019    EXCISIONAL BX    BREAST SURGERY      biopsy    BUNIONECTOMY      COSMETIC SURGERY      facelift    EYE SURGERY      bilateral cataracts       Review of patient's allergies indicates:   Allergen Reactions    No known drug allergies        No current facility-administered medications on file prior to encounter.      Current Outpatient Medications on File Prior to Encounter   Medication Sig    amLODIPine (NORVASC) 5 MG tablet TAKE 1 TABLET DAILY    citalopram (CELEXA)  20 MG tablet TAKE 1 TABLET DAILY    lisinopriL (PRINIVIL,ZESTRIL) 40 MG tablet Take 1 tablet (40 mg total) by mouth once daily.    mupirocin (BACTROBAN) 2 % ointment     nadoloL (CORGARD) 40 MG tablet TAKE 1 TABLET TWICE A DAY    temazepam (RESTORIL) 15 mg Cap Take 1 capsule (15 mg total) by mouth nightly as needed.    traMADoL (ULTRAM) 50 mg tablet TAKE 1 TABLET BY MOUTH EVERY 6-8 HOURS AS NEEDED FOR PAIN More than 7 day quantity medically necessary    zolpidem (AMBIEN) 10 mg Tab TAKE 1 TABLET(10 MG) BY MOUTH EVERY NIGHT AS NEEDED    [DISCONTINUED] clobetasol 0.05% (TEMOVATE) 0.05 % Oint     hydroCHLOROthiazide (HYDRODIURIL) 25 MG tablet Take 1 tablet (25 mg total) by mouth once daily. For blood pressure.     Family History     Problem Relation (Age of Onset)    Cancer Father, Brother, Brother    Diabetes Brother    Heart disease Mother    Hypertension Mother, Brother, Brother        Tobacco Use    Smoking status: Never Smoker    Smokeless tobacco: Never Used   Substance and Sexual Activity    Alcohol use: Yes    Drug use: No    Sexual activity: Yes     Partners: Male     Birth control/protection: None     Review of Systems   Constitution: Positive for malaise/fatigue. Negative for chills and fever.   HENT: Negative for nosebleeds.    Eyes: Negative for double vision, vision loss in left eye and vision loss in right eye.   Cardiovascular: Positive for dyspnea on exertion, irregular heartbeat, orthopnea and palpitations. Negative for chest pain, claudication, leg swelling, near-syncope, paroxysmal nocturnal dyspnea and syncope.   Respiratory: Positive for shortness of breath. Negative for cough, hemoptysis and wheezing.    Endocrine: Negative for cold intolerance and heat intolerance.   Hematologic/Lymphatic: Negative for bleeding problem. Does not bruise/bleed easily.   Skin: Negative for color change and rash.   Musculoskeletal: Negative for back pain, falls, muscle weakness and myalgias.    Gastrointestinal: Positive for bloating. Negative for heartburn, hematemesis, hematochezia, hemorrhoids, jaundice, melena, nausea and vomiting.   Genitourinary: Negative for dysuria and hematuria.   Neurological: Negative for dizziness, focal weakness, headaches, light-headedness, loss of balance, numbness, vertigo and weakness.   Psychiatric/Behavioral: Negative for altered mental status, depression and memory loss. The patient is not nervous/anxious.    Allergic/Immunologic: Negative for hives and persistent infections.     Objective:     Vital Signs (Most Recent):  Temp: 97.5 °F (36.4 °C) (06/18/20 1915)  Pulse: 86 (06/18/20 1945)  Resp: 17 (06/18/20 1945)  BP: 113/64 (06/18/20 1945)  SpO2: 95 % (06/18/20 1945) Vital Signs (24h Range):  Temp:  [97.5 °F (36.4 °C)-98.1 °F (36.7 °C)] 97.5 °F (36.4 °C)  Pulse:  [] 86  Resp:  [15-32] 17  SpO2:  [88 %-98 %] 95 %  BP: (113-215)/() 113/64     Weight: 64.5 kg (142 lb 3.2 oz)  Body mass index is 24.41 kg/m².    SpO2: 95 %  O2 Device (Oxygen Therapy): nasal cannula      Intake/Output Summary (Last 24 hours) at 6/18/2020 2013  Last data filed at 6/18/2020 1900  Gross per 24 hour   Intake 247.33 ml   Output 900 ml   Net -652.67 ml       Lines/Drains/Airways     Drain            Female External Urinary Catheter 06/18/20 1800 less than 1 day          Peripheral Intravenous Line                 Peripheral IV - Single Lumen 06/18/20 1412 20 G Right Hand less than 1 day         Peripheral IV - Single Lumen 06/18/20 1605 20 G Left;Distal Forearm less than 1 day                Physical Exam   Constitutional: She is oriented to person, place, and time. She appears well-developed and well-nourished.  Non-toxic appearance. No distress.   HENT:   Head: Normocephalic and atraumatic.   Nose: Nose normal.   Eyes: Right eye exhibits no discharge. Left eye exhibits no discharge. Right conjunctiva is not injected. Left conjunctiva is not injected. Right pupil is round. Left  pupil is round. Pupils are equal.   Neck: Neck supple. No JVD present. Carotid bruit is not present. No thyromegaly present.   Cardiovascular: Normal rate, S1 normal and S2 normal. An irregularly irregular rhythm present.  No extrasystoles are present. PMI is not displaced. Exam reveals no gallop.   Murmur heard.   Midsystolic murmur is present with a grade of 1/6 at the apex.  Pulses:       Radial pulses are 2+ on the right side and 2+ on the left side.        Femoral pulses are 2+ on the right side and 2+ on the left side.       Dorsalis pedis pulses are 2+ on the right side and 2+ on the left side.        Posterior tibial pulses are 2+ on the right side and 2+ on the left side.   Pulmonary/Chest: Effort normal. She has decreased breath sounds in the right lower field. She has rales in the left lower field.   Abdominal: Soft. Normal appearance. She exhibits shifting dullness and distension. There is no hepatosplenomegaly. There is no abdominal tenderness.   Musculoskeletal:      Right ankle: She exhibits no swelling, no ecchymosis and no deformity.      Left ankle: She exhibits no swelling, no ecchymosis and no deformity.   Lymphadenopathy:        Head (right side): No submandibular adenopathy present.        Head (left side): No submandibular adenopathy present.     She has no cervical adenopathy.   Neurological: She is alert and oriented to person, place, and time. She is not disoriented. No cranial nerve deficit.   Skin: Skin is warm, dry and intact. No rash noted. She is not diaphoretic.   Psychiatric: She has a normal mood and affect. Her speech is normal and behavior is normal. Judgment and thought content normal. Cognition and memory are normal.       Current Medications:     [START ON 6/19/2020] citalopram  20 mg Oral Daily    enoxaparin  60 mg Subcutaneous Q12H    [START ON 6/19/2020] lisinopriL  40 mg Oral Daily    polyethylene glycol  17 g Oral Daily     Current Laboratory Results:    Recent Results  (from the past 24 hour(s))   CBC auto differential    Collection Time: 06/18/20  2:17 PM   Result Value Ref Range    WBC 7.28 3.90 - 12.70 K/uL    RBC 3.82 (L) 4.00 - 5.40 M/uL    Hemoglobin 12.1 12.0 - 16.0 g/dL    Hematocrit 38.6 37.0 - 48.5 %    Mean Corpuscular Volume 101 (H) 82 - 98 fL    Mean Corpuscular Hemoglobin 31.7 (H) 27.0 - 31.0 pg    Mean Corpuscular Hemoglobin Conc 31.3 (L) 32.0 - 36.0 g/dL    RDW 12.8 11.5 - 14.5 %    Platelets 283 150 - 350 K/uL    MPV 10.2 9.2 - 12.9 fL    Immature Granulocytes 0.3 0.0 - 0.5 %    Gran # (ANC) 4.3 1.8 - 7.7 K/uL    Immature Grans (Abs) 0.02 0.00 - 0.04 K/uL    Lymph # 2.0 1.0 - 4.8 K/uL    Mono # 0.7 0.3 - 1.0 K/uL    Eos # 0.2 0.0 - 0.5 K/uL    Baso # 0.06 0.00 - 0.20 K/uL    nRBC 0 0 /100 WBC    Gran% 59.4 38.0 - 73.0 %    Lymph% 27.1 18.0 - 48.0 %    Mono% 9.9 4.0 - 15.0 %    Eosinophil% 2.5 0.0 - 8.0 %    Basophil% 0.8 0.0 - 1.9 %    Differential Method Automated    B-Type natriuretic peptide (BNP)    Collection Time: 06/18/20  2:17 PM   Result Value Ref Range     (H) 0 - 99 pg/mL   COVID-19 Rapid Screening    Collection Time: 06/18/20  2:21 PM   Result Value Ref Range    SARS-CoV-2 RNA, Amplification, Qual Negative Negative   TSH    Collection Time: 06/18/20  2:49 PM   Result Value Ref Range    TSH 4.771 (H) 0.400 - 4.000 uIU/mL   Troponin I    Collection Time: 06/18/20  2:49 PM   Result Value Ref Range    Troponin I 0.007 0.000 - 0.026 ng/mL   Comprehensive metabolic panel    Collection Time: 06/18/20  2:49 PM   Result Value Ref Range    Sodium 136 136 - 145 mmol/L    Potassium 4.6 3.5 - 5.1 mmol/L    Chloride 102 95 - 110 mmol/L    CO2 20 (L) 23 - 29 mmol/L    Glucose 88 70 - 110 mg/dL    BUN, Bld 12 8 - 23 mg/dL    Creatinine 0.7 0.5 - 1.4 mg/dL    Calcium 9.4 8.7 - 10.5 mg/dL    Total Protein 7.7 6.0 - 8.4 g/dL    Albumin 3.6 3.5 - 5.2 g/dL    Total Bilirubin 0.6 0.1 - 1.0 mg/dL    Alkaline Phosphatase 130 55 - 135 U/L    AST 37 10 - 40 U/L    ALT 28  10 - 44 U/L    Anion Gap 14 8 - 16 mmol/L    eGFR if African American >60 >60 mL/min/1.73 m^2    eGFR if non African American >60 >60 mL/min/1.73 m^2   T4, free    Collection Time: 06/18/20  2:49 PM   Result Value Ref Range    Free T4 0.98 0.71 - 1.51 ng/dL   Procalcitonin    Collection Time: 06/18/20  6:20 PM   Result Value Ref Range    Procalcitonin 0.02 <0.25 ng/mL   Lactic acid, plasma    Collection Time: 06/18/20  6:20 PM   Result Value Ref Range    Lactate (Lactic Acid) 1.2 0.5 - 2.2 mmol/L     Current Imaging Results:    X-Ray Chest AP Portable   Final Result      Abnormal findings within the right lower thorax consistent with effusion/infiltrate along with cardiomegaly.         Electronically signed by: Sulaiman Gomez MD   Date:    06/18/2020   Time:    15:09      X-Ray Abdomen Flat And Erect   Final Result      Findings within the included right lower thorax suggestive of effusion and postinflammatory changes as well as cardiomegaly.  Incidental osseous findings with otherwise flat and erect views of the abdomen.         Electronically signed by: Sulaiman Gomez MD   Date:    06/18/2020   Time:    15:08        ECG: Atrial fibrillation with  bpm.      Assessment and Plan:     Active Diagnoses:    Diagnosis Date Noted POA    PRINCIPAL PROBLEM:  Atrial fibrillation with rapid ventricular response [I48.91] 06/18/2020 Yes    Recurrent right pleural effusion [J90] 06/18/2020 Yes    Abdominal distention [R14.0] 06/18/2020 Yes    Gastroesophageal reflux disease [K21.9] 05/19/2014 Yes    Essential hypertension [I10] 07/16/2012 Yes      Problems Resolved During this Admission:       Assessment and Plan:    1. Atrial Fibrillation   5/20/2020: Clinical onset of persistent atrial fibrillation.   6/18/2020: Presents with fast VRR.   [id number]=2 (CHA2Sc).   Rare control with diltiazem iv.   Begin metoprolol 50 mg Q6.   Anticoagulation with apixiban.   Echo.   Would favor plan for RAYSA guided CV in 1-2  weeks.    2. Chronic Anticoagulation   [id number] (CHA2Sc).   Anticoagulation with apixiban.   Apixiban 5 mg Q12.    3. Heart Failure, Acute   Probably Heart Failure, Diastolic, Acute   Diuresis.    4. Hypertension   At home been on nadolol 40 mg Q12 and lisinopril 40 mg Q24.    5. Migraine    Occasional tramadol.    6. Primary Care   Dr. Abran Terrazas.      VTE Risk Mitigation (From admission, onward)         Ordered     enoxaparin injection 60 mg  Every 12 hours (non-standard times)      06/18/20 1708     IP VTE HIGH RISK PATIENT  Once      06/18/20 1704     Place sequential compression device  Until discontinued      06/18/20 1704                Thank you for your consult.    I will follow-up with patient. Please contact us if you have any additional questions.    Donnell Mckeon MD  Cardiology   [facility]

## 2020-06-19 NOTE — ASSESSMENT & PLAN NOTE
Will lower with diltiazem infusion (given mostly for rate control) and also lisinopril.  Will monitor and adjust regimen as needed to achieve good blood pressure control.

## 2020-06-19 NOTE — HOSPITAL COURSE
Patient is 80-year-old woman with hypertension admitted to the intensive care unit for treatment of new onset of atrial fibrillation with rapid ventricular response and for evaluation of a right pleural effusion likely due to heart failure.  Patient started on a continuous diltiazem infusion to control her rapid response.  Patient also started on anticoagulation due to high risk of stroke with a [id number] Score of at least 4 points (age, female sex, hypertension).  She was initially treated with subcutaneous enoxaparin which was subsequently transition to oral apixaban.  Patient's heart rate improved and she was weaned off continuous diltiazem infusion and transitioned to oral metoprolol.  Despite this her pulse remained elevated and required additional doses of AV bernadette blockade and therefore patient was also loaded with digoxin.  Her ventricular response rate improved.  She had an episode of confusion likely related to use of zolpidem.  Confusion resolved.      Echocardiogram showed evidence of modest reduction in systolic function but with severely dilated left atrium and severely dilated right atrium with mild to moderate diffuse valvular disease.  Repeat chest radiograph shows improvement in her right pleural effusion.  I suspect her pleural effusions most likely a transudate related to heart failure probably due in part to her tachyarrhythmia.  However the effusion persists or worsens despite adequate diuresis and medical therapy to treat her heart failure I would advise patient undergo a thoracentesis.  She was weaned off supplemental oxygen and did not qualify for supplemental oxygen with ambulation.  Patient otherwise clinically stable this point to be discharged home with home health for additional therapy with close outpatient follow-up with Dr. Donnell Mckeon (Cardiology) and her primary care provider Dr. Abran Terrazas.

## 2020-06-19 NOTE — PLAN OF CARE
"Patient has a dx of new-onset A-Fib. AAOx4, on 2 L o2 n/c while awake, 3 L o2 when sleeping. Cardene drip titrated per parameters. Headache pain not effectively treatment with prn Tylenol 1000 mg PO x 1; pain relief with prn Tramadol 50 mg PO x 1.  Ambien 10 mg PO prn qHS effective for insomnia. Patient has remained in bed throughout this shift.      Obi NP notified about BG 68 on am bloodwork; confirmed with POCT BG 66, asymptomatic. Patient states "I haven't been eating much lately. I don't have diabetes". Patient drank a container of orange juice and ate one pack of milton crackers. POCT BG 80. No new orders at this time.   "

## 2020-06-19 NOTE — EICU
Telephone call RN  Patient complaining migraine headache, not relieved by Tylenol 1000 mg p.o.  Chart reviewed  Home medication tramadol 50 mg q.6h p.r.n. ordered    This report has been created through the use of [employer] dictation software. Typographical and content errors may occur with this process. While efforts are made to detect and correct such errors, in some cases errors will persist. For this reason, wording in this document should be considered in the proper context and not strictly verbatim

## 2020-06-19 NOTE — PROGRESS NOTES
[facility]  Cardiology  Progress Note    Patient Name: Ellen Neves  MRN: [id number]  Admission Date: 6/18/2020  Hospital Length of Stay: 1 days  Code Status: Full Code   Attending Physician: Juan Jimenez MD   Primary Care Physician: Abran Terrazas MD  Expected Discharge Date:   Principal Problem:Atrial fibrillation with rapid ventricular response    Subjective:     Brief HPI:    Ellen Neves is a 80 y.o.female with hypertension. On about 5/20/2020 she had nausea and vomited. She felt weak. At that time she also noted that her heart rate had become elevated. She mostly recorded ar heart rate 100-120 bpm. She continued to record an elevated heart rate over the coming weeks. On about 6/15/2020 she felt increasingly weak and began being short of breath. She noted her abdomen had become mildly distended. She saw Dr. Abran Terrazas on 6/18/2020 and was referred to the ER at [facility] for admission. She was in atrial fibrillation with a VRR of about 150 bpm on presentation. She denies any chest pain.      Hospital Course:    Diltiazem iv and metoprolol po.    Apixiban.    Diuresis.    Interval History:    VRR on high side. Breathing easier after diuresis.    Review of Systems   Constitution: Negative for chills, fever and malaise/fatigue.   HENT: Negative for nosebleeds.    Eyes: Negative for vision loss in left eye and vision loss in right eye.   Cardiovascular: Negative for chest pain, leg swelling, orthopnea, palpitations and paroxysmal nocturnal dyspnea.   Respiratory: Positive for shortness of breath. Negative for cough, hemoptysis, sputum production and wheezing.    Hematologic/Lymphatic: Negative for bleeding problem.   Skin: Negative for rash.   Musculoskeletal: Negative for myalgias.   Gastrointestinal: Negative for abdominal pain, heartburn, hematemesis, hematochezia, jaundice, melena, nausea and vomiting.   Genitourinary: Negative for hematuria.   Neurological: Negative for dizziness,  headaches, light-headedness, vertigo and weakness.   Psychiatric/Behavioral: Negative for altered mental status. The patient is not nervous/anxious.    Allergic/Immunologic: Negative for persistent infections.       Objective:     Vital Signs (Most Recent):  Temp: 97.7 °F (36.5 °C) (06/19/20 0715)  Pulse: 110 (06/19/20 0749)  Resp: (!) 25 (06/19/20 0749)  BP: (!) 166/82 (06/19/20 0730)  SpO2: (!) 92 % (06/19/20 0749) Vital Signs (24h Range):  Temp:  [97.5 °F (36.4 °C)-98.1 °F (36.7 °C)] 97.7 °F (36.5 °C)  Pulse:  [] 110  Resp:  [15-39] 25  SpO2:  [88 %-98 %] 92 %  BP: (107-215)/() 166/82     Weight: 64.5 kg (142 lb 3.2 oz)  Body mass index is 24.41 kg/m².    SpO2: (!) 92 %  O2 Device (Oxygen Therapy): nasal cannula      Intake/Output Summary (Last 24 hours) at 6/19/2020 0757  Last data filed at 6/19/2020 0600  Gross per 24 hour   Intake 450.91 ml   Output 1900 ml   Net -1449.09 ml       Lines/Drains/Airways     Drain            Female External Urinary Catheter 06/18/20 1800 less than 1 day          Peripheral Intravenous Line                 Peripheral IV - Single Lumen 06/18/20 1412 20 G Right Hand less than 1 day         Peripheral IV - Single Lumen 06/18/20 1605 20 G Left;Distal Forearm less than 1 day                Physical Exam   Constitutional: She is oriented to person, place, and time. She appears well-developed and well-nourished.  Non-toxic appearance. She does not appear ill. No distress.   Eyes: Right conjunctiva is not injected. Right conjunctiva has no hemorrhage. Left conjunctiva is not injected. Left conjunctiva has no hemorrhage.   Neck: No JVD present.   Cardiovascular: S1 normal and S2 normal. An irregularly irregular rhythm present. Tachycardia present. Exam reveals no gallop.   Murmur heard.   Midsystolic murmur is present with a grade of 2/6 at the lower left sternal border.  Pulmonary/Chest: Effort normal. She has decreased breath sounds in the right lower field.   Abdominal:  Normal appearance. There is no abdominal tenderness.   Musculoskeletal:      Right ankle: She exhibits no swelling.      Left ankle: She exhibits no swelling.   Neurological: She is alert and oriented to person, place, and time. She is not disoriented.   Skin: Skin is warm and dry. No rash noted.   Psychiatric: She has a normal mood and affect. Her speech is normal and behavior is normal. Cognition and memory are normal.       Current Medications:     apixaban  5 mg Oral BID    citalopram  20 mg Oral Daily    furosemide (LASIX) IV  20 mg Intravenous Q12H    lisinopriL  40 mg Oral Daily    metoprolol tartrate  50 mg Oral QID    polyethylene glycol  17 g Oral Daily     Current Laboratory Results:    Recent Results (from the past 24 hour(s))   CBC auto differential    Collection Time: 06/18/20  2:17 PM   Result Value Ref Range    WBC 7.28 3.90 - 12.70 K/uL    RBC 3.82 (L) 4.00 - 5.40 M/uL    Hemoglobin 12.1 12.0 - 16.0 g/dL    Hematocrit 38.6 37.0 - 48.5 %    Mean Corpuscular Volume 101 (H) 82 - 98 fL    Mean Corpuscular Hemoglobin 31.7 (H) 27.0 - 31.0 pg    Mean Corpuscular Hemoglobin Conc 31.3 (L) 32.0 - 36.0 g/dL    RDW 12.8 11.5 - 14.5 %    Platelets 283 150 - 350 K/uL    MPV 10.2 9.2 - 12.9 fL    Immature Granulocytes 0.3 0.0 - 0.5 %    Gran # (ANC) 4.3 1.8 - 7.7 K/uL    Immature Grans (Abs) 0.02 0.00 - 0.04 K/uL    Lymph # 2.0 1.0 - 4.8 K/uL    Mono # 0.7 0.3 - 1.0 K/uL    Eos # 0.2 0.0 - 0.5 K/uL    Baso # 0.06 0.00 - 0.20 K/uL    nRBC 0 0 /100 WBC    Gran% 59.4 38.0 - 73.0 %    Lymph% 27.1 18.0 - 48.0 %    Mono% 9.9 4.0 - 15.0 %    Eosinophil% 2.5 0.0 - 8.0 %    Basophil% 0.8 0.0 - 1.9 %    Differential Method Automated    B-Type natriuretic peptide (BNP)    Collection Time: 06/18/20  2:17 PM   Result Value Ref Range     (H) 0 - 99 pg/mL   COVID-19 Rapid Screening    Collection Time: 06/18/20  2:21 PM   Result Value Ref Range    SARS-CoV-2 RNA, Amplification, Qual Negative Negative   TSH     Collection Time: 06/18/20  2:49 PM   Result Value Ref Range    TSH 4.771 (H) 0.400 - 4.000 uIU/mL   Troponin I    Collection Time: 06/18/20  2:49 PM   Result Value Ref Range    Troponin I 0.007 0.000 - 0.026 ng/mL   Comprehensive metabolic panel    Collection Time: 06/18/20  2:49 PM   Result Value Ref Range    Sodium 136 136 - 145 mmol/L    Potassium 4.6 3.5 - 5.1 mmol/L    Chloride 102 95 - 110 mmol/L    CO2 20 (L) 23 - 29 mmol/L    Glucose 88 70 - 110 mg/dL    BUN, Bld 12 8 - 23 mg/dL    Creatinine 0.7 0.5 - 1.4 mg/dL    Calcium 9.4 8.7 - 10.5 mg/dL    Total Protein 7.7 6.0 - 8.4 g/dL    Albumin 3.6 3.5 - 5.2 g/dL    Total Bilirubin 0.6 0.1 - 1.0 mg/dL    Alkaline Phosphatase 130 55 - 135 U/L    AST 37 10 - 40 U/L    ALT 28 10 - 44 U/L    Anion Gap 14 8 - 16 mmol/L    eGFR if African American >60 >60 mL/min/1.73 m^2    eGFR if non African American >60 >60 mL/min/1.73 m^2   T4, free    Collection Time: 06/18/20  2:49 PM   Result Value Ref Range    Free T4 0.98 0.71 - 1.51 ng/dL   Procalcitonin    Collection Time: 06/18/20  6:20 PM   Result Value Ref Range    Procalcitonin 0.02 <0.25 ng/mL   Lactic acid, plasma    Collection Time: 06/18/20  6:20 PM   Result Value Ref Range    Lactate (Lactic Acid) 1.2 0.5 - 2.2 mmol/L   Magnesium    Collection Time: 06/19/20  3:10 AM   Result Value Ref Range    Magnesium 1.6 1.6 - 2.6 mg/dL   Phosphorus    Collection Time: 06/19/20  3:10 AM   Result Value Ref Range    Phosphorus 3.2 2.7 - 4.5 mg/dL   CBC auto differential    Collection Time: 06/19/20  3:10 AM   Result Value Ref Range    WBC 5.90 3.90 - 12.70 K/uL    RBC 3.48 (L) 4.00 - 5.40 M/uL    Hemoglobin 11.0 (L) 12.0 - 16.0 g/dL    Hematocrit 34.7 (L) 37.0 - 48.5 %    Mean Corpuscular Volume 100 (H) 82 - 98 fL    Mean Corpuscular Hemoglobin 31.6 (H) 27.0 - 31.0 pg    Mean Corpuscular Hemoglobin Conc 31.7 (L) 32.0 - 36.0 g/dL    RDW 12.5 11.5 - 14.5 %    Platelets 256 150 - 350 K/uL    MPV 9.8 9.2 - 12.9 fL    Immature  Granulocytes 0.3 0.0 - 0.5 %    Gran # (ANC) 2.4 1.8 - 7.7 K/uL    Immature Grans (Abs) 0.02 0.00 - 0.04 K/uL    Lymph # 2.5 1.0 - 4.8 K/uL    Mono # 0.8 0.3 - 1.0 K/uL    Eos # 0.2 0.0 - 0.5 K/uL    Baso # 0.06 0.00 - 0.20 K/uL    nRBC 0 0 /100 WBC    Gran% 40.9 38.0 - 73.0 %    Lymph% 41.7 18.0 - 48.0 %    Mono% 12.7 4.0 - 15.0 %    Eosinophil% 3.4 0.0 - 8.0 %    Basophil% 1.0 0.0 - 1.9 %    Differential Method Automated    Troponin I    Collection Time: 06/19/20  3:10 AM   Result Value Ref Range    Troponin I <0.006 0.000 - 0.026 ng/mL   Protime-INR    Collection Time: 06/19/20  3:10 AM   Result Value Ref Range    Prothrombin Time 11.9 9.0 - 12.5 sec    INR 1.1 0.8 - 1.2   APTT    Collection Time: 06/19/20  3:10 AM   Result Value Ref Range    aPTT 35.0 (H) 21.0 - 32.0 sec   Comprehensive metabolic panel    Collection Time: 06/19/20  3:10 AM   Result Value Ref Range    Sodium 134 (L) 136 - 145 mmol/L    Potassium 4.2 3.5 - 5.1 mmol/L    Chloride 100 95 - 110 mmol/L    CO2 19 (L) 23 - 29 mmol/L    Glucose 68 (L) 70 - 110 mg/dL    BUN, Bld 15 8 - 23 mg/dL    Creatinine 0.7 0.5 - 1.4 mg/dL    Calcium 9.3 8.7 - 10.5 mg/dL    Total Protein 7.1 6.0 - 8.4 g/dL    Albumin 3.3 (L) 3.5 - 5.2 g/dL    Total Bilirubin 0.6 0.1 - 1.0 mg/dL    Alkaline Phosphatase 116 55 - 135 U/L    AST 33 10 - 40 U/L    ALT 24 10 - 44 U/L    Anion Gap 15 8 - 16 mmol/L    eGFR if African American >60 >60 mL/min/1.73 m^2    eGFR if non African American >60 >60 mL/min/1.73 m^2   POCT glucose    Collection Time: 06/19/20  4:12 AM   Result Value Ref Range    POCT Glucose 66 (L) 70 - 110 mg/dL   POCT glucose    Collection Time: 06/19/20  4:33 AM   Result Value Ref Range    POCT Glucose 80 70 - 110 mg/dL     Current Imaging Results:    X-Ray Chest AP Portable   Final Result      Abnormal findings within the right lower thorax consistent with effusion/infiltrate along with cardiomegaly.         Electronically signed by: Sulaiman Gomez MD    Date:    06/18/2020   Time:    15:09      X-Ray Abdomen Flat And Erect   Final Result      Findings within the included right lower thorax suggestive of effusion and postinflammatory changes as well as cardiomegaly.  Incidental osseous findings with otherwise flat and erect views of the abdomen.         Electronically signed by: Sulaiman Gomez MD   Date:    06/18/2020   Time:    15:08          Assessment and Plan:     Problem List:    Active Diagnoses:    Diagnosis Date Noted POA    PRINCIPAL PROBLEM:  Atrial fibrillation with rapid ventricular response [I48.91] 06/18/2020 Yes    Pleural effusion on right [J90] 06/18/2020 Yes    Abdominal distention [R14.0] 06/18/2020 Yes    Essential hypertension [I10] 07/16/2012 Yes      Problems Resolved During this Admission:     Assessment and Plan:     1. Atrial Fibrillation              5/20/2020: Clinical onset of persistent atrial fibrillation.              6/18/2020: Presents with fast VRR. Received diltiazem iv.               [id number] (CHA2Sc).              On metoprolol 50 mg Q6 and diltiazem iv.              Anticoagulation with apixiban.              Echo.              Would favor plan for RAYSA guided CV in 1-2 weeks.   Change metoprolol to 100 mg Q12.     2. Chronic Anticoagulation              [id number] (CHA2Sc).              Anticoagulation with apixiban.              Apixiban 5 mg Q12.   No aspirin or NSAID.     3. Heart Failure, Acute              Probably Heart Failure, Diastolic, Acute              Diuresis.     4. Hypertension              At home been on nadolol 40 mg Q12 and lisinopril 40 mg Q24.   On metoprolol 100 mg Q12.   Reduce lisinopril to 10 mg Q24.     5. Migraine               Occasional tramadol.     6. Primary Care              Dr. Abran Terrazas.      VTE Risk Mitigation (From admission, onward)         Ordered     apixaban tablet 5 mg  2 times daily      06/18/20 2032     IP VTE HIGH RISK PATIENT  Once      06/18/20 1704     Place  sequential compression device  Until discontinued      06/18/20 2543                Donnell Mckeon MD  Cardiology  [facility]

## 2020-06-19 NOTE — PLAN OF CARE
F - Nelda and Belief: Shinto    I - Importance: Pt thankful for anointing that Fr. Spear provided.     C - Community: Pt's daughter bedside. Pt has two other adult children and mentioned other grandchildren.     A - Address in Care:   introduced and offered pastoral support with reflective listening. Fr. Spear has visited to anointed. No further spiritual needs expressed. Pt made aware of 's presence as needed.

## 2020-06-19 NOTE — PLAN OF CARE
CM met with pt for initial discharge planning assessment. Pt is alert and oriented at time of assessment.    Pt verified demographic information is correct in Epic.   Pt verified PCP is correct in [facility], .  Pharmacy of choice is [facility] on [email or web address] and [facility], and [facility].    Pt is independent, lives with her spouse, Dain Neves, [phone].    Pt with no HH, DME, and is not on HD.    Pt with no 30 day readmit.   Pt with no financial issues at this time.  Spouse to provide transportation home when discharged.    CM to follow for plans and arrangements.       06/19/20 1052   Discharge Assessment   Assessment Type Discharge Planning Assessment   Confirmed/corrected address and phone number on facesheet? Yes   Assessment information obtained from? Patient;Medical Record   Expected Length of Stay (days) 3   Prior to hospitilization cognitive status: Alert/Oriented   Prior to hospitalization functional status: Independent   Current cognitive status: Alert/Oriented   Current Functional Status: Independent   Lives With spouse   Able to Return to Prior Arrangements yes   Is patient able to care for self after discharge? Yes   Readmission Within the Last 30 Days no previous admission in last 30 days   Patient currently being followed by outpatient case management? No   Patient currently receives any other outside agency services? No   Equipment Currently Used at Home none   Do you have any problems affording any of your prescribed medications? No   Is the patient taking medications as prescribed? yes   Does the patient have transportation home? Yes   Transportation Anticipated family or friend will provide;car, drives self   Does the patient receive services at the [facility]? No   Discharge Plan A Home   Discharge Plan B Home   DME Needed Upon Discharge  none   Patient/Family in Agreement with Plan yes

## 2020-06-19 NOTE — ASSESSMENT & PLAN NOTE
Minimal tobacco smoking history.  No B symptoms to suggest malignancy. History not suggestive of pneumonia.  Procalcitonin negative.  Lactate normal.  Infectious etiology unlikely.  Suspect likely due to new onset of heart failure.  Will treat with intravenous furosemide to achieve negative fluid balance.  Echocardiogram pending.  If does not improve will consider thoracentesis although anticoagulation will need to be held prior to procedure.

## 2020-06-19 NOTE — ASSESSMENT & PLAN NOTE
No prior history of atrial fibrillation.  Failed to improve significantly despite two doses of intravenous metoprolol and two doses of intravenous diltiazem.  Started on continuous diltiazem infusion for rate control. [id number] Score of at least 4 points (age, female sex, hypertension).  Patient reports no history of bleeding disorder.  Started on full dose anticoagulation with subcutaneous enoxaparin.  Patient counseled on the risk and benefits of anticoagulation.  Risk of atrial fibrillation high due to age and long-standing history of hypertension but also due to alcohol intake.  Patient counseled to abstain from alcohol.  No history of alcohol withdrawal but will monitor for withdrawal symptoms. Suspect component of heart failure.  Checking echocardiogram.  Consulted cardiology.

## 2020-06-19 NOTE — H&P
[facility] Hospital Medicine  History & Physical    Patient Name: Ellen Neves  MRN: [id number]  Admission Date: 6/18/2020  Attending Physician: Juan Jimenez MD   Primary Care Provider: Abran Terrazas MD         Patient information was obtained from patient, relative(s), past medical records and ER records.     Subjective:     Principal Problem:Atrial fibrillation with rapid ventricular response    Chief Complaint:   Chief Complaint   Patient presents with    Shortness of Breath     pt sent over from dr office with new onset a fib.        HPI: Patient is 80-year-old woman with longstanding history of hypertension who was sent to the emergency department by her primary care provider Dr. Abran Terrazas due to dyspnea with new onset of atrial fibrillation rapid ventricular response.  Patient reports that she has had progressive shortness of breath for the last 2 days but notice change initially about 2 weeks ago when she had some shortness of breath at that time.  She denies any chest pain.  She denies any fevers or chills.  She reports noticing progressively worsening abdominal distention but denies any abdominal pain, nausea, or vomiting.  She reports she has chronic constipation has not had a bowel movement since Monday.  She reports smoking tobacco in college but otherwise been tobacco free.  She does drink about 3 drinks of wine per day.  Her family reports that this is a conservative estimate.  She denies any history of alcohol withdrawal.    Past Medical History:   Diagnosis Date    Anemia     Cataract     GERD (gastroesophageal reflux disease)     Hypertension     Macular degeneration     Migraine        Past Surgical History:   Procedure Laterality Date    APPENDECTOMY      BREAST BIOPSY Left 03/22/2019    EXCISIONAL BX    BREAST SURGERY      biopsy    BUNIONECTOMY      COSMETIC SURGERY      facelift    EYE SURGERY      bilateral cataracts       Review of patient's  allergies indicates:   Allergen Reactions    No known drug allergies        No current facility-administered medications on file prior to encounter.      Current Outpatient Medications on File Prior to Encounter   Medication Sig    amLODIPine (NORVASC) 5 MG tablet TAKE 1 TABLET DAILY    citalopram (CELEXA) 20 MG tablet TAKE 1 TABLET DAILY    lisinopriL (PRINIVIL,ZESTRIL) 40 MG tablet Take 1 tablet (40 mg total) by mouth once daily.    mupirocin (BACTROBAN) 2 % ointment     nadoloL (CORGARD) 40 MG tablet TAKE 1 TABLET TWICE A DAY    temazepam (RESTORIL) 15 mg Cap Take 1 capsule (15 mg total) by mouth nightly as needed.    traMADoL (ULTRAM) 50 mg tablet TAKE 1 TABLET BY MOUTH EVERY 6-8 HOURS AS NEEDED FOR PAIN More than 7 day quantity medically necessary    zolpidem (AMBIEN) 10 mg Tab TAKE 1 TABLET(10 MG) BY MOUTH EVERY NIGHT AS NEEDED    [DISCONTINUED] clobetasol 0.05% (TEMOVATE) 0.05 % Oint     hydroCHLOROthiazide (HYDRODIURIL) 25 MG tablet Take 1 tablet (25 mg total) by mouth once daily. For blood pressure.     Family History     Problem Relation (Age of Onset)    Cancer Father, Brother, Brother    Diabetes Brother    Heart disease Mother    Hypertension Mother, Brother, Brother        Tobacco Use    Smoking status: Never Smoker    Smokeless tobacco: Never Used   Substance and Sexual Activity    Alcohol use: Yes    Drug use: No    Sexual activity: Yes     Partners: Male     Birth control/protection: None     Review of Systems   Constitutional: Negative for chills and fever.   HENT: Negative for congestion, hearing loss, sinus pressure and trouble swallowing.    Eyes: Negative for photophobia, pain, redness and visual disturbance.   Respiratory: Positive for shortness of breath. Negative for cough, chest tightness and wheezing.    Cardiovascular: Positive for palpitations. Negative for chest pain and leg swelling.   Gastrointestinal: Negative for abdominal distention, abdominal pain, blood in stool,  constipation, diarrhea, nausea and vomiting.   Endocrine: Negative for cold intolerance, heat intolerance, polydipsia, polyphagia and polyuria.   Genitourinary: Negative for dysuria and frequency.   Musculoskeletal: Negative for arthralgias, back pain, gait problem, joint swelling, myalgias and neck pain.   Allergic/Immunologic: Negative for environmental allergies, food allergies and immunocompromised state.   Neurological: Negative for dizziness, seizures, syncope, weakness, light-headedness and headaches.   Hematological: Negative for adenopathy.   Psychiatric/Behavioral: Negative for agitation, behavioral problems and confusion.     Objective:     Vital Signs (Most Recent):  Temp: 97.5 °F (36.4 °C) (06/18/20 1915)  Pulse: 86 (06/18/20 1945)  Resp: 17 (06/18/20 1945)  BP: 113/64 (06/18/20 1945)  SpO2: 95 % (06/18/20 1945) Vital Signs (24h Range):  Temp:  [97.5 °F (36.4 °C)-98.1 °F (36.7 °C)] 97.5 °F (36.4 °C)  Pulse:  [] 86  Resp:  [15-32] 17  SpO2:  [88 %-98 %] 95 %  BP: (113-215)/() 113/64     Weight: 64.5 kg (142 lb 3.2 oz)  Body mass index is 24.41 kg/m².    Physical Exam  Constitutional:       General: She is in acute distress.      Appearance: She is well-developed. She is not diaphoretic.   HENT:      Head: Normocephalic and atraumatic.      Nose: Nose normal.      Mouth/Throat:      Pharynx: No oropharyngeal exudate.   Eyes:      General: No scleral icterus.        Right eye: No discharge.         Left eye: No discharge.      Conjunctiva/sclera: Conjunctivae normal.      Pupils: Pupils are equal, round, and reactive to light.   Neck:      Musculoskeletal: Normal range of motion and neck supple.      Thyroid: No thyromegaly.      Vascular: No JVD.      Trachea: No tracheal deviation.   Cardiovascular:      Rate and Rhythm: Tachycardia present. Rhythm irregular.      Heart sounds: Normal heart sounds. No murmur. No friction rub. No gallop.    Pulmonary:      Effort: No respiratory distress.       Breath sounds: No stridor. Rales present. No wheezing.      Comments: Mild increased work of breathing  Chest:      Chest wall: No tenderness.   Abdominal:      General: Bowel sounds are normal. There is distension.      Palpations: Abdomen is soft. There is no mass.      Tenderness: There is no abdominal tenderness. There is no guarding or rebound.      Comments: Mild distention without tenderness.   Musculoskeletal: Normal range of motion.         General: No tenderness.   Lymphadenopathy:      Cervical: No cervical adenopathy.   Skin:     General: Skin is warm and dry.      Coloration: Skin is not pale.      Findings: No erythema or rash.   Neurological:      Mental Status: She is alert and oriented to person, place, and time.      Cranial Nerves: No cranial nerve deficit.      Motor: No abnormal muscle tone.      Coordination: Coordination normal.      Deep Tendon Reflexes: Reflexes are normal and symmetric. Reflexes normal.   Psychiatric:         Behavior: Behavior normal.         Thought Content: Thought content normal.         Judgment: Judgment normal.           CRANIAL NERVES     CN III, IV, VI   Pupils are equal, round, and reactive to light.       Significant Labs: All pertinent labs within the past 24 hours have been reviewed.    Significant Imaging: I have reviewed all pertinent imaging results/findings within the past 24 hours.    Assessment/Plan:     * Atrial fibrillation with rapid ventricular response  No prior history of atrial fibrillation.  Failed to improve significantly despite two doses of intravenous metoprolol and two doses of intravenous diltiazem.  Started on continuous diltiazem infusion for rate control. [id number] Score of at least 4 points (age, female sex, hypertension).  Patient reports no history of bleeding disorder.  Started on full dose anticoagulation with subcutaneous enoxaparin.  Patient counseled on the risk and benefits of anticoagulation.  Risk of atrial fibrillation high  due to age and long-standing history of hypertension but also due to alcohol intake.  Patient counseled to abstain from alcohol.  No history of alcohol withdrawal but will monitor for withdrawal symptoms. Suspect component of heart failure.  Checking echocardiogram.  Consulted cardiology.    Pleural effusion on right  Minimal tobacco smoking history.  No B symptoms to suggest malignancy. History not suggestive of pneumonia.  Procalcitonin negative.  Lactate normal.  Infectious etiology unlikely.  Suspect likely due to new onset of heart failure.  Will treat with intravenous furosemide to achieve negative fluid balance.  Echocardiogram pending.  If does not improve will consider thoracentesis although anticoagulation will need to be held prior to procedure.    Abdominal distention  Mild distention but soft and not distended.  No nausea or vomiting.  No abdominal pain.  Possible fluid retention due to heart failure.  She also reports chronic long-standing history of constipation and reports no bowel movement since Monday.  Will start bowel regimen to relief constipation and continue to monitor for changes in her abdominal exam.    Essential hypertension  Will lower with diltiazem infusion (given mostly for rate control) and also lisinopril.  Will monitor and adjust regimen as needed to achieve good blood pressure control.     VTE Risk Mitigation (From admission, onward)         Ordered     enoxaparin injection 60 mg  Every 12 hours (non-standard times)      06/18/20 1708     IP VTE HIGH RISK PATIENT  Once      06/18/20 1704     Place sequential compression device  Until discontinued      06/18/20 1704                Juan Jimenez MD  Department of Hospital Medicine   [facility]

## 2020-06-19 NOTE — SUBJECTIVE & OBJECTIVE
Past Medical History:   Diagnosis Date    Anemia     Cataract     GERD (gastroesophageal reflux disease)     Hypertension     Macular degeneration     Migraine        Past Surgical History:   Procedure Laterality Date    APPENDECTOMY      BREAST BIOPSY Left 03/22/2019    EXCISIONAL BX    BREAST SURGERY      biopsy    BUNIONECTOMY      COSMETIC SURGERY      facelift    EYE SURGERY      bilateral cataracts       Review of patient's allergies indicates:   Allergen Reactions    No known drug allergies        No current facility-administered medications on file prior to encounter.      Current Outpatient Medications on File Prior to Encounter   Medication Sig    amLODIPine (NORVASC) 5 MG tablet TAKE 1 TABLET DAILY    citalopram (CELEXA) 20 MG tablet TAKE 1 TABLET DAILY    lisinopriL (PRINIVIL,ZESTRIL) 40 MG tablet Take 1 tablet (40 mg total) by mouth once daily.    mupirocin (BACTROBAN) 2 % ointment     nadoloL (CORGARD) 40 MG tablet TAKE 1 TABLET TWICE A DAY    temazepam (RESTORIL) 15 mg Cap Take 1 capsule (15 mg total) by mouth nightly as needed.    traMADoL (ULTRAM) 50 mg tablet TAKE 1 TABLET BY MOUTH EVERY 6-8 HOURS AS NEEDED FOR PAIN More than 7 day quantity medically necessary    zolpidem (AMBIEN) 10 mg Tab TAKE 1 TABLET(10 MG) BY MOUTH EVERY NIGHT AS NEEDED    [DISCONTINUED] clobetasol 0.05% (TEMOVATE) 0.05 % Oint     hydroCHLOROthiazide (HYDRODIURIL) 25 MG tablet Take 1 tablet (25 mg total) by mouth once daily. For blood pressure.     Family History     Problem Relation (Age of Onset)    Cancer Father, Brother, Brother    Diabetes Brother    Heart disease Mother    Hypertension Mother, Brother, Brother        Tobacco Use    Smoking status: Never Smoker    Smokeless tobacco: Never Used   Substance and Sexual Activity    Alcohol use: Yes    Drug use: No    Sexual activity: Yes     Partners: Male     Birth control/protection: None     Review of Systems   Constitutional: Negative for  chills and fever.   HENT: Negative for congestion, hearing loss, sinus pressure and trouble swallowing.    Eyes: Negative for photophobia, pain, redness and visual disturbance.   Respiratory: Positive for shortness of breath. Negative for cough, chest tightness and wheezing.    Cardiovascular: Positive for palpitations. Negative for chest pain and leg swelling.   Gastrointestinal: Negative for abdominal distention, abdominal pain, blood in stool, constipation, diarrhea, nausea and vomiting.   Endocrine: Negative for cold intolerance, heat intolerance, polydipsia, polyphagia and polyuria.   Genitourinary: Negative for dysuria and frequency.   Musculoskeletal: Negative for arthralgias, back pain, gait problem, joint swelling, myalgias and neck pain.   Allergic/Immunologic: Negative for environmental allergies, food allergies and immunocompromised state.   Neurological: Negative for dizziness, seizures, syncope, weakness, light-headedness and headaches.   Hematological: Negative for adenopathy.   Psychiatric/Behavioral: Negative for agitation, behavioral problems and confusion.     Objective:     Vital Signs (Most Recent):  Temp: 97.5 °F (36.4 °C) (06/18/20 1915)  Pulse: 86 (06/18/20 1945)  Resp: 17 (06/18/20 1945)  BP: 113/64 (06/18/20 1945)  SpO2: 95 % (06/18/20 1945) Vital Signs (24h Range):  Temp:  [97.5 °F (36.4 °C)-98.1 °F (36.7 °C)] 97.5 °F (36.4 °C)  Pulse:  [] 86  Resp:  [15-32] 17  SpO2:  [88 %-98 %] 95 %  BP: (113-215)/() 113/64     Weight: 64.5 kg (142 lb 3.2 oz)  Body mass index is 24.41 kg/m².    Physical Exam  Constitutional:       General: She is in acute distress.      Appearance: She is well-developed. She is not diaphoretic.   HENT:      Head: Normocephalic and atraumatic.      Nose: Nose normal.      Mouth/Throat:      Pharynx: No oropharyngeal exudate.   Eyes:      General: No scleral icterus.        Right eye: No discharge.         Left eye: No discharge.      Conjunctiva/sclera:  Conjunctivae normal.      Pupils: Pupils are equal, round, and reactive to light.   Neck:      Musculoskeletal: Normal range of motion and neck supple.      Thyroid: No thyromegaly.      Vascular: No JVD.      Trachea: No tracheal deviation.   Cardiovascular:      Rate and Rhythm: Tachycardia present. Rhythm irregular.      Heart sounds: Normal heart sounds. No murmur. No friction rub. No gallop.    Pulmonary:      Effort: No respiratory distress.      Breath sounds: No stridor. Rales present. No wheezing.      Comments: Mild increased work of breathing  Chest:      Chest wall: No tenderness.   Abdominal:      General: Bowel sounds are normal. There is distension.      Palpations: Abdomen is soft. There is no mass.      Tenderness: There is no abdominal tenderness. There is no guarding or rebound.      Comments: Mild distention without tenderness.   Musculoskeletal: Normal range of motion.         General: No tenderness.   Lymphadenopathy:      Cervical: No cervical adenopathy.   Skin:     General: Skin is warm and dry.      Coloration: Skin is not pale.      Findings: No erythema or rash.   Neurological:      Mental Status: She is alert and oriented to person, place, and time.      Cranial Nerves: No cranial nerve deficit.      Motor: No abnormal muscle tone.      Coordination: Coordination normal.      Deep Tendon Reflexes: Reflexes are normal and symmetric. Reflexes normal.   Psychiatric:         Behavior: Behavior normal.         Thought Content: Thought content normal.         Judgment: Judgment normal.           CRANIAL NERVES     CN III, IV, VI   Pupils are equal, round, and reactive to light.       Significant Labs: All pertinent labs within the past 24 hours have been reviewed.    Significant Imaging: I have reviewed all pertinent imaging results/findings within the past 24 hours.

## 2020-06-19 NOTE — EICU
Telephone call RN  Home medication Ambien 10 mg q.h.s. p.r.n. for insomnia ordered    This report has been created through the use of [employer] dictation software. Typographical and content errors may occur with this process. While efforts are made to detect and correct such errors, in some cases errors will persist. For this reason, wording in this document should be considered in the proper context and not strictly verbatim

## 2020-06-20 LAB
ALBUMIN SERPL BCP-MCNC: 3.5 G/DL (ref 3.5–5.2)
ALLENS TEST: NORMAL
ALP SERPL-CCNC: 114 U/L (ref 55–135)
ALT SERPL W/O P-5'-P-CCNC: 21 U/L (ref 10–44)
ANION GAP SERPL CALC-SCNC: 13 MMOL/L (ref 8–16)
AST SERPL-CCNC: 34 U/L (ref 10–40)
BASOPHILS # BLD AUTO: 0.04 K/UL (ref 0–0.2)
BASOPHILS NFR BLD: 0.5 % (ref 0–1.9)
BILIRUB SERPL-MCNC: 0.5 MG/DL (ref 0.1–1)
BUN SERPL-MCNC: 13 MG/DL (ref 8–23)
CALCIUM SERPL-MCNC: 9.3 MG/DL (ref 8.7–10.5)
CHLORIDE SERPL-SCNC: 93 MMOL/L (ref 95–110)
CO2 SERPL-SCNC: 22 MMOL/L (ref 23–29)
CREAT SERPL-MCNC: 0.7 MG/DL (ref 0.5–1.4)
DELSYS: NORMAL
DIFFERENTIAL METHOD: ABNORMAL
EOSINOPHIL # BLD AUTO: 0.2 K/UL (ref 0–0.5)
EOSINOPHIL NFR BLD: 2.1 % (ref 0–8)
ERYTHROCYTE [DISTWIDTH] IN BLOOD BY AUTOMATED COUNT: 12.3 % (ref 11.5–14.5)
ERYTHROCYTE [SEDIMENTATION RATE] IN BLOOD BY WESTERGREN METHOD: 14 MM/H
EST. GFR  (AFRICAN AMERICAN): >60 ML/MIN/1.73 M^2
EST. GFR  (NON AFRICAN AMERICAN): >60 ML/MIN/1.73 M^2
FLOW: 3
GLUCOSE SERPL-MCNC: 102 MG/DL (ref 70–110)
HCO3 UR-SCNC: 25.6 MMOL/L (ref 24–28)
HCT VFR BLD AUTO: 34.9 % (ref 37–48.5)
HGB BLD-MCNC: 11.4 G/DL (ref 12–16)
IMM GRANULOCYTES # BLD AUTO: 0.02 K/UL (ref 0–0.04)
IMM GRANULOCYTES NFR BLD AUTO: 0.2 % (ref 0–0.5)
LYMPHOCYTES # BLD AUTO: 1.7 K/UL (ref 1–4.8)
LYMPHOCYTES NFR BLD: 20 % (ref 18–48)
MAGNESIUM SERPL-MCNC: 1.3 MG/DL (ref 1.6–2.6)
MCH RBC QN AUTO: 32.6 PG (ref 27–31)
MCHC RBC AUTO-ENTMCNC: 32.7 G/DL (ref 32–36)
MCV RBC AUTO: 100 FL (ref 82–98)
MODE: NORMAL
MONOCYTES # BLD AUTO: 0.8 K/UL (ref 0.3–1)
MONOCYTES NFR BLD: 8.9 % (ref 4–15)
NEUTROPHILS # BLD AUTO: 5.9 K/UL (ref 1.8–7.7)
NEUTROPHILS NFR BLD: 68.3 % (ref 38–73)
NRBC BLD-RTO: 0 /100 WBC
PCO2 BLDA: 42.8 MMHG (ref 35–45)
PH SMN: 7.38 [PH] (ref 7.35–7.45)
PHOSPHATE SERPL-MCNC: 2.9 MG/DL (ref 2.7–4.5)
PLATELET # BLD AUTO: 284 K/UL (ref 150–350)
PMV BLD AUTO: 10.2 FL (ref 9.2–12.9)
PO2 BLDA: 93 MMHG (ref 80–100)
POC BE: 1 MMOL/L
POC SATURATED O2: 97 % (ref 95–100)
POCT GLUCOSE: 115 MG/DL (ref 70–110)
POTASSIUM SERPL-SCNC: 4.4 MMOL/L (ref 3.5–5.1)
PROT SERPL-MCNC: 7.5 G/DL (ref 6–8.4)
RBC # BLD AUTO: 3.5 M/UL (ref 4–5.4)
SAMPLE: NORMAL
SITE: NORMAL
SODIUM SERPL-SCNC: 128 MMOL/L (ref 136–145)
SP02: 97
WBC # BLD AUTO: 8.66 K/UL (ref 3.9–12.7)

## 2020-06-20 PROCEDURE — 80053 COMPREHEN METABOLIC PANEL: CPT

## 2020-06-20 PROCEDURE — 99900035 HC TECH TIME PER 15 MIN (STAT)

## 2020-06-20 PROCEDURE — 94761 N-INVAS EAR/PLS OXIMETRY MLT: CPT

## 2020-06-20 PROCEDURE — 25000003 PHARM REV CODE 250: Performed by: HOSPITALIST

## 2020-06-20 PROCEDURE — 99233 PR SUBSEQUENT HOSPITAL CARE,LEVL III: ICD-10-PCS | Mod: ,,, | Performed by: INTERNAL MEDICINE

## 2020-06-20 PROCEDURE — 25000003 PHARM REV CODE 250: Performed by: SURGERY

## 2020-06-20 PROCEDURE — 63600175 PHARM REV CODE 636 W HCPCS: Performed by: INTERNAL MEDICINE

## 2020-06-20 PROCEDURE — 36600 WITHDRAWAL OF ARTERIAL BLOOD: CPT

## 2020-06-20 PROCEDURE — 25000003 PHARM REV CODE 250: Performed by: INTERNAL MEDICINE

## 2020-06-20 PROCEDURE — 84100 ASSAY OF PHOSPHORUS: CPT

## 2020-06-20 PROCEDURE — 36415 COLL VENOUS BLD VENIPUNCTURE: CPT

## 2020-06-20 PROCEDURE — 99233 SBSQ HOSP IP/OBS HIGH 50: CPT | Mod: ,,, | Performed by: INTERNAL MEDICINE

## 2020-06-20 PROCEDURE — 11000001 HC ACUTE MED/SURG PRIVATE ROOM

## 2020-06-20 PROCEDURE — 63600175 PHARM REV CODE 636 W HCPCS: Performed by: STUDENT IN AN ORGANIZED HEALTH CARE EDUCATION/TRAINING PROGRAM

## 2020-06-20 PROCEDURE — 82803 BLOOD GASES ANY COMBINATION: CPT

## 2020-06-20 PROCEDURE — 99233 SBSQ HOSP IP/OBS HIGH 50: CPT | Mod: ,,, | Performed by: HOSPITALIST

## 2020-06-20 PROCEDURE — 25000003 PHARM REV CODE 250: Performed by: STUDENT IN AN ORGANIZED HEALTH CARE EDUCATION/TRAINING PROGRAM

## 2020-06-20 PROCEDURE — 99233 PR SUBSEQUENT HOSPITAL CARE,LEVL III: ICD-10-PCS | Mod: ,,, | Performed by: HOSPITALIST

## 2020-06-20 PROCEDURE — 27000221 HC OXYGEN, UP TO 24 HOURS

## 2020-06-20 PROCEDURE — 83735 ASSAY OF MAGNESIUM: CPT

## 2020-06-20 PROCEDURE — 85025 COMPLETE CBC W/AUTO DIFF WBC: CPT

## 2020-06-20 RX ORDER — FUROSEMIDE 20 MG/1
20 TABLET ORAL DAILY
Status: DISCONTINUED | OUTPATIENT
Start: 2020-06-21 | End: 2020-06-22 | Stop reason: HOSPADM

## 2020-06-20 RX ORDER — TEMAZEPAM 7.5 MG/1
7.5 CAPSULE ORAL NIGHTLY
Qty: 7 CAPSULE | Refills: 0 | Status: SHIPPED | OUTPATIENT
Start: 2020-06-20 | End: 2020-06-27

## 2020-06-20 RX ORDER — DIGOXIN 0.25 MG/ML
250 INJECTION INTRAMUSCULAR; INTRAVENOUS ONCE
Status: COMPLETED | OUTPATIENT
Start: 2020-06-20 | End: 2020-06-20

## 2020-06-20 RX ORDER — LABETALOL HYDROCHLORIDE 5 MG/ML
10 INJECTION, SOLUTION INTRAVENOUS EVERY 4 HOURS PRN
Status: DISCONTINUED | OUTPATIENT
Start: 2020-06-20 | End: 2020-06-22 | Stop reason: HOSPADM

## 2020-06-20 RX ORDER — TEMAZEPAM 7.5 MG/1
7.5 CAPSULE ORAL NIGHTLY
Status: DISCONTINUED | OUTPATIENT
Start: 2020-06-20 | End: 2020-06-22 | Stop reason: HOSPADM

## 2020-06-20 RX ORDER — LISINOPRIL 20 MG/1
20 TABLET ORAL DAILY
Status: DISCONTINUED | OUTPATIENT
Start: 2020-06-21 | End: 2020-06-21

## 2020-06-20 RX ORDER — DIGOXIN 125 MCG
0.12 TABLET ORAL DAILY
Status: DISCONTINUED | OUTPATIENT
Start: 2020-06-21 | End: 2020-06-21

## 2020-06-20 RX ORDER — LISINOPRIL 10 MG/1
10 TABLET ORAL ONCE
Status: COMPLETED | OUTPATIENT
Start: 2020-06-20 | End: 2020-06-20

## 2020-06-20 RX ORDER — ZOLPIDEM TARTRATE 5 MG/1
5 TABLET ORAL NIGHTLY PRN
Status: DISCONTINUED | OUTPATIENT
Start: 2020-06-20 | End: 2020-06-20

## 2020-06-20 RX ORDER — HALOPERIDOL 5 MG/ML
INJECTION INTRAMUSCULAR
Status: COMPLETED
Start: 2020-06-20 | End: 2020-06-20

## 2020-06-20 RX ADMIN — DILTIAZEM HYDROCHLORIDE 5 MG: 5 INJECTION INTRAVENOUS at 02:06

## 2020-06-20 RX ADMIN — ACETAMINOPHEN 1000 MG: 500 TABLET ORAL at 07:06

## 2020-06-20 RX ADMIN — LORAZEPAM 1 MG: 2 INJECTION INTRAMUSCULAR; INTRAVENOUS at 01:06

## 2020-06-20 RX ADMIN — SODIUM CHLORIDE 250 ML: 0.9 INJECTION, SOLUTION INTRAVENOUS at 01:06

## 2020-06-20 RX ADMIN — TRAMADOL HYDROCHLORIDE 50 MG: 50 TABLET, FILM COATED ORAL at 08:06

## 2020-06-20 RX ADMIN — APIXABAN 5 MG: 2.5 TABLET, FILM COATED ORAL at 08:06

## 2020-06-20 RX ADMIN — DIGOXIN 250 MCG: 0.25 INJECTION INTRAMUSCULAR; INTRAVENOUS at 11:06

## 2020-06-20 RX ADMIN — METOPROLOL TARTRATE 100 MG: 50 TABLET, FILM COATED ORAL at 08:06

## 2020-06-20 RX ADMIN — TRAMADOL HYDROCHLORIDE 50 MG: 50 TABLET, FILM COATED ORAL at 02:06

## 2020-06-20 RX ADMIN — TEMAZEPAM 7.5 MG: 7.5 CAPSULE ORAL at 09:06

## 2020-06-20 RX ADMIN — FUROSEMIDE 20 MG: 10 INJECTION, SOLUTION INTRAMUSCULAR; INTRAVENOUS at 08:06

## 2020-06-20 RX ADMIN — LABETALOL HYDROCHLORIDE 10 MG: 5 INJECTION, SOLUTION INTRAVENOUS at 03:06

## 2020-06-20 RX ADMIN — POTASSIUM CHLORIDE 10 MEQ: 750 TABLET, EXTENDED RELEASE ORAL at 09:06

## 2020-06-20 RX ADMIN — CITALOPRAM HYDROBROMIDE 20 MG: 20 TABLET ORAL at 08:06

## 2020-06-20 RX ADMIN — POTASSIUM CHLORIDE 10 MEQ: 750 TABLET, EXTENDED RELEASE ORAL at 08:06

## 2020-06-20 RX ADMIN — LISINOPRIL 10 MG: 10 TABLET ORAL at 06:06

## 2020-06-20 RX ADMIN — DIGOXIN 250 MCG: 0.25 INJECTION INTRAMUSCULAR; INTRAVENOUS at 12:06

## 2020-06-20 RX ADMIN — LABETALOL HYDROCHLORIDE 10 MG: 5 INJECTION, SOLUTION INTRAVENOUS at 07:06

## 2020-06-20 RX ADMIN — ACETAMINOPHEN 1000 MG: 500 TABLET ORAL at 02:06

## 2020-06-20 RX ADMIN — LISINOPRIL 10 MG: 10 TABLET ORAL at 08:06

## 2020-06-20 RX ADMIN — ACETAMINOPHEN 1000 MG: 500 TABLET ORAL at 08:06

## 2020-06-20 RX ADMIN — DILTIAZEM HYDROCHLORIDE 5 MG: 5 INJECTION INTRAVENOUS at 08:06

## 2020-06-20 RX ADMIN — DILTIAZEM HYDROCHLORIDE 5 MG: 5 INJECTION INTRAVENOUS at 05:06

## 2020-06-20 NOTE — PROGRESS NOTES
[facility] Hospital Medicine  Progress Note    Patient Name: Ellen Neves  MRN: [id number]  Patient Class: IP- Inpatient   Admission Date: 6/18/2020  Length of Stay: 2 days  Attending Physician: Juan Jimenez MD  Primary Care Provider: Abran Terrazas MD        Subjective:     Principal Problem:Atrial fibrillation with rapid ventricular response        HPI:  Patient is 80-year-old woman with longstanding history of hypertension who was sent to the emergency department by her primary care provider Dr. Abran Terrazas due to dyspnea with new onset of atrial fibrillation rapid ventricular response.  Patient reports that she has had progressive shortness of breath for the last 2 days but notice change initially about 2 weeks ago when she had some shortness of breath at that time.  She denies any chest pain.  She denies any fevers or chills.  She reports noticing progressively worsening abdominal distention but denies any abdominal pain, nausea, or vomiting.  She reports she has chronic constipation has not had a bowel movement since Monday.  She reports smoking tobacco in college but otherwise been tobacco free.  She does drink about 3 drinks of wine per day.  Her family reports that this is a conservative estimate.  She denies any history of alcohol withdrawal.    Overview/Hospital Course:  Patient is 80-year-old woman with hypertension admitted to the intensive care unit for treatment of new onset of atrial fibrillation with rapid ventricular response and for evaluation of a right pleural effusion likely due to heart failure.  Patient started on a continuous diltiazem infusion to control her rapid response.  Rate improved she was transitioned to oral metoprolol.  Despite this her pulse remained elevated and required additional doses of AV bernadette blockade.  Patient was loaded with digoxin.  She had some confusion agitation last night however improved today.  Echocardiogram shows evidence of  modest reduction in systolic function but with severely dilated left atrium and severely dilated right atrium with mild to moderate diffuse valvular disease.    Interval History:  Patient became confused last night.  Mental status improved this morning.  Shortness of breath improving.    Review of Systems   Constitutional: Negative for chills and fever.   Respiratory: Positive for shortness of breath. Negative for wheezing.    Cardiovascular: Negative for chest pain.   Gastrointestinal: Negative for abdominal distention, abdominal pain, constipation, diarrhea, nausea and vomiting.   Genitourinary: Negative for dysuria and frequency.   Musculoskeletal: Negative for arthralgias and myalgias.   Neurological: Negative for light-headedness.   Psychiatric/Behavioral: Negative for agitation and confusion.     Objective:     Vital Signs (Most Recent):  Temp: 97.6 °F (36.4 °C) (06/20/20 1200)  Pulse: 96 (06/20/20 1400)  Resp: 14 (06/20/20 1400)  BP: (!) 150/90 (06/20/20 1400)  SpO2: 98 % (06/20/20 1400) Vital Signs (24h Range):  Temp:  [97.2 °F (36.2 °C)-97.7 °F (36.5 °C)] 97.6 °F (36.4 °C)  Pulse:  [] 96  Resp:  [12-36] 14  SpO2:  [91 %-99 %] 98 %  BP: (133-197)/() 150/90     Weight: 64.5 kg (142 lb 3.2 oz)  Body mass index is 24.41 kg/m².    Intake/Output Summary (Last 24 hours) at 6/20/2020 1504  Last data filed at 6/20/2020 1400  Gross per 24 hour   Intake 850 ml   Output 3000 ml   Net -2150 ml      Physical Exam  Constitutional:       General: She is not in acute distress.     Appearance: She is well-developed.   HENT:      Head: Atraumatic.   Eyes:      Conjunctiva/sclera: Conjunctivae normal.   Neck:      Musculoskeletal: Neck supple.   Cardiovascular:      Rate and Rhythm: Tachycardia present. Rhythm irregular.      Heart sounds: Normal heart sounds. No murmur.   Pulmonary:      Effort: Pulmonary effort is normal.      Breath sounds: Normal breath sounds. No wheezing.   Abdominal:      General: Bowel  sounds are normal. There is no distension.      Palpations: Abdomen is soft.      Tenderness: There is no abdominal tenderness.   Musculoskeletal: Normal range of motion.         General: No deformity.      Right lower leg: Edema present.      Left lower leg: Edema present.   Neurological:      Mental Status: She is alert and oriented to person, place, and time.         Significant Labs: All pertinent labs within the past 24 hours have been reviewed.    Significant Imaging: I have reviewed all pertinent imaging results/findings within the past 24 hours.      Assessment/Plan:      * Atrial fibrillation with rapid ventricular response  No prior history of atrial fibrillation.  Failed to improve significantly despite two doses of intravenous metoprolol and two doses of intravenous diltiazem.  Started on continuous diltiazem infusion for rate control. [id number] Score of at least 4 points (age, female sex, hypertension).  Patient reports no history of bleeding disorder.  Started on full dose anticoagulation with subcutaneous enoxaparin and switched to oral apixaban.  Rate improved and diltiazem discontinued.  Patient transition to oral metoprolol and also started digoxin.  Patient also is receiving p.r.n. doses of short-acting diltiazem and labetalol for elevated pulse.  Echocardiogram revealed severe right atrial enlargement.      Pleural effusion on right  Minimal tobacco smoking history.  No B symptoms to suggest malignancy. History not suggestive of pneumonia.  Procalcitonin negative.  Lactate normal.  Infectious etiology unlikely.  Suspect likely due to new onset of heart failure.  Continue with intravenous furosemide to achieve negative fluid balance.  If does not improve will consider thoracentesis although anticoagulation will need to be held prior to procedure.    Abdominal distention  Improved with bowel regimen.    Essential hypertension  Reasonably controlled with current regimen.  Will continue with  current regimen and continue to monitor.      VTE Risk Mitigation (From admission, onward)         Ordered     apixaban tablet 5 mg  2 times daily      06/18/20 2032     IP VTE HIGH RISK PATIENT  Once      06/18/20 1704     Place sequential compression device  Until discontinued      06/18/20 1704                      Juan Jimenez MD  Department of Hospital Medicine   [facility]

## 2020-06-20 NOTE — SUBJECTIVE & OBJECTIVE
Interval History:  Patient became confused last night.  Mental status improved this morning.  Shortness of breath improving.    Review of Systems   Constitutional: Negative for chills and fever.   Respiratory: Positive for shortness of breath. Negative for wheezing.    Cardiovascular: Negative for chest pain.   Gastrointestinal: Negative for abdominal distention, abdominal pain, constipation, diarrhea, nausea and vomiting.   Genitourinary: Negative for dysuria and frequency.   Musculoskeletal: Negative for arthralgias and myalgias.   Neurological: Negative for light-headedness.   Psychiatric/Behavioral: Negative for agitation and confusion.     Objective:     Vital Signs (Most Recent):  Temp: 97.6 °F (36.4 °C) (06/20/20 1200)  Pulse: 96 (06/20/20 1400)  Resp: 14 (06/20/20 1400)  BP: (!) 150/90 (06/20/20 1400)  SpO2: 98 % (06/20/20 1400) Vital Signs (24h Range):  Temp:  [97.2 °F (36.2 °C)-97.7 °F (36.5 °C)] 97.6 °F (36.4 °C)  Pulse:  [] 96  Resp:  [12-36] 14  SpO2:  [91 %-99 %] 98 %  BP: (133-197)/() 150/90     Weight: 64.5 kg (142 lb 3.2 oz)  Body mass index is 24.41 kg/m².    Intake/Output Summary (Last 24 hours) at 6/20/2020 1504  Last data filed at 6/20/2020 1400  Gross per 24 hour   Intake 850 ml   Output 3000 ml   Net -2150 ml      Physical Exam  Constitutional:       General: She is not in acute distress.     Appearance: She is well-developed.   HENT:      Head: Atraumatic.   Eyes:      Conjunctiva/sclera: Conjunctivae normal.   Neck:      Musculoskeletal: Neck supple.   Cardiovascular:      Rate and Rhythm: Tachycardia present. Rhythm irregular.      Heart sounds: Normal heart sounds. No murmur.   Pulmonary:      Effort: Pulmonary effort is normal.      Breath sounds: Normal breath sounds. No wheezing.   Abdominal:      General: Bowel sounds are normal. There is no distension.      Palpations: Abdomen is soft.      Tenderness: There is no abdominal tenderness.   Musculoskeletal: Normal range of  motion.         General: No deformity.      Right lower leg: Edema present.      Left lower leg: Edema present.   Neurological:      Mental Status: She is alert and oriented to person, place, and time.         Significant Labs: All pertinent labs within the past 24 hours have been reviewed.    Significant Imaging: I have reviewed all pertinent imaging results/findings within the past 24 hours.

## 2020-06-20 NOTE — NURSING TRANSFER
Nursing Transfer Note      6/20/2020     Transfer To: [id number]    Transfer via bed    Transfer with 2L to O2, cardiac monitoring    Transported by transport and RN    Medicines sent: yes    Chart send with patient: Yes    Notified: daughter    Patient reassessed at: 6/20/2020     Upon arrival to floor: Receiving RN @ bedside, cardiac monitor applied, patient oriented to room, call bell in reach and bed in lowest position, no distress noted.

## 2020-06-20 NOTE — SUBJECTIVE & OBJECTIVE
Interval History:   Rate improved and breathing more comfortably.    Review of Systems   Constitutional: Negative for chills and fever.   Respiratory: Positive for shortness of breath. Negative for wheezing.    Cardiovascular: Negative for chest pain.   Gastrointestinal: Negative for abdominal distention, abdominal pain, constipation, diarrhea, nausea and vomiting.   Genitourinary: Negative for dysuria and frequency.   Musculoskeletal: Negative for arthralgias and myalgias.   Neurological: Negative for light-headedness.   Psychiatric/Behavioral: Negative for agitation and confusion.     Objective:     Vital Signs (Most Recent):  Temp: 97.7 °F (36.5 °C) (06/19/20 1901)  Pulse: (!) 116 (06/19/20 2231)  Resp: (!) 22 (06/19/20 2231)  BP: (!) 161/92 (06/19/20 2231)  SpO2: (!) 94 % (06/19/20 2231) Vital Signs (24h Range):  Temp:  [97.6 °F (36.4 °C)-97.7 °F (36.5 °C)] 97.7 °F (36.5 °C)  Pulse:  [] 116  Resp:  [12-39] 22  SpO2:  [91 %-98 %] 94 %  BP: (107-169)/() 161/92     Weight: 64.5 kg (142 lb 3.2 oz)  Body mass index is 24.41 kg/m².    Intake/Output Summary (Last 24 hours) at 6/19/2020 2238  Last data filed at 6/19/2020 2143  Gross per 24 hour   Intake 143.58 ml   Output 1050 ml   Net -906.42 ml      Physical Exam  Constitutional:       General: She is not in acute distress.     Appearance: She is well-developed.   HENT:      Head: Atraumatic.   Eyes:      Conjunctiva/sclera: Conjunctivae normal.   Neck:      Musculoskeletal: Neck supple.   Cardiovascular:      Rate and Rhythm: Tachycardia present. Rhythm irregular.      Heart sounds: Normal heart sounds. No murmur.   Pulmonary:      Effort: Pulmonary effort is normal.      Breath sounds: Normal breath sounds. No wheezing.   Abdominal:      General: Bowel sounds are normal. There is no distension.      Palpations: Abdomen is soft.      Tenderness: There is no abdominal tenderness.   Musculoskeletal: Normal range of motion.         General: No deformity.       Right lower leg: Edema present.      Left lower leg: Edema present.   Neurological:      Mental Status: She is alert and oriented to person, place, and time.         Significant Labs: All pertinent labs within the past 24 hours have been reviewed.    Significant Imaging: I have reviewed all pertinent imaging results/findings within the past 24 hours.

## 2020-06-20 NOTE — PLAN OF CARE
"Patient has a dx of new-onset A-Fib. Bright and pleasant AAOx4 this morning, on 2 L o2 n/c while awake, 3 L o2 when sleeping. Prn Cardene 5mg IV push given 5 times during this shift. Headache relieved effectively prn Tylenol 1000 mg PO x 1 and prn Tramadol 50 mg PO x 1.  Prn Labetalol 10 mg IV push given x 1 for SBP > 170.     Bilateral wrist restraints removed this morning. Patient states "I remember everything that happened last night. At the time, I was seeing ghosts, and everything felt like a party." Patient has been up to [facility] twice this morning with a BM.   "

## 2020-06-20 NOTE — PROGRESS NOTES
Patient's heart rate maintaining 112-116 bpm currently with /92. Mike notified and ordered a one-time dose of Digoxin 250 mcg IVP now and another one-time of Digoxin 250 mcg IVP again at 1 am tomorrow (hold if HR < 60 bpm or SBP < 100). Also, transfer to Med-Surg/Tele tonight cancelled by Mike BUITRAGO.

## 2020-06-20 NOTE — ASSESSMENT & PLAN NOTE
No prior history of atrial fibrillation.  Failed to improve significantly despite two doses of intravenous metoprolol and two doses of intravenous diltiazem.  Started on continuous diltiazem infusion for rate control. [id number] Score of at least 4 points (age, female sex, hypertension).  Patient reports no history of bleeding disorder.  Started on full dose anticoagulation with subcutaneous enoxaparin and switched to oral apixaban.  Rate improved and diltiazem discontinued.  Patient transition to oral metoprolol and also started digoxin.  Patient also is receiving p.r.n. doses of short-acting diltiazem and labetalol for elevated pulse.  Echocardiogram revealed severe right atrial enlargement.

## 2020-06-20 NOTE — PROGRESS NOTES
"Around 12:30am, patient had removed her IV and was attempting to stand outside of her bed, apologizing, "I'm sorry, I don't know why I can't remember that I need to stay here in the hospital". At 1 am, patient had removed her second IV and threw it across her room, yelling, "I don't care what you or the doctors want or that I have A-Fib! I just want out of here!" Oriented to time, place, self, but no longer to situation. eICU alerted for agitation; see note. ABG WNL. Will continue to monitor.   "

## 2020-06-20 NOTE — ASSESSMENT & PLAN NOTE
No prior history of atrial fibrillation.  Failed to improve significantly despite two doses of intravenous metoprolol and two doses of intravenous diltiazem.  Started on continuous diltiazem infusion for rate control. [id number] Score of at least 4 points (age, female sex, hypertension).  Patient reports no history of bleeding disorder.  Started on full dose anticoagulation with subcutaneous enoxaparin and switched to oral apixaban.  Rate improved and diltiazem discontinued.  Continue with metoprolol along with digoxin which was started tonight. Echocardiogram revealed severe right atrial enlargement.

## 2020-06-20 NOTE — PROGRESS NOTES
[facility] Hospital Medicine  Progress Note    Patient Name: Ellen Neves  MRN: [id number]  Patient Class: IP- Inpatient   Admission Date: 6/18/2020  Length of Stay: 1 days  Attending Physician: Juan Jimenez MD  Primary Care Provider: Abran Terrazas MD        Subjective:     Principal Problem:Atrial fibrillation with rapid ventricular response        HPI:  Patient is 80-year-old woman with longstanding history of hypertension who was sent to the emergency department by her primary care provider Dr. Abran Terrazas due to dyspnea with new onset of atrial fibrillation rapid ventricular response.  Patient reports that she has had progressive shortness of breath for the last 2 days but notice change initially about 2 weeks ago when she had some shortness of breath at that time.  She denies any chest pain.  She denies any fevers or chills.  She reports noticing progressively worsening abdominal distention but denies any abdominal pain, nausea, or vomiting.  She reports she has chronic constipation has not had a bowel movement since Monday.  She reports smoking tobacco in college but otherwise been tobacco free.  She does drink about 3 drinks of wine per day.  Her family reports that this is a conservative estimate.  She denies any history of alcohol withdrawal.    Overview/Hospital Course:  Patient is 80-year-old woman with hypertension admitted to the intensive care unit for treatment of new onset of atrial fibrillation with rapid ventricular response and for evaluation of a right pleural effusion likely due to heart failure.    Interval History:   Rate improved and breathing more comfortably.    Review of Systems   Constitutional: Negative for chills and fever.   Respiratory: Positive for shortness of breath. Negative for wheezing.    Cardiovascular: Negative for chest pain.   Gastrointestinal: Negative for abdominal distention, abdominal pain, constipation, diarrhea, nausea and vomiting.    Genitourinary: Negative for dysuria and frequency.   Musculoskeletal: Negative for arthralgias and myalgias.   Neurological: Negative for light-headedness.   Psychiatric/Behavioral: Negative for agitation and confusion.     Objective:     Vital Signs (Most Recent):  Temp: 97.7 °F (36.5 °C) (06/19/20 1901)  Pulse: (!) 116 (06/19/20 2231)  Resp: (!) 22 (06/19/20 2231)  BP: (!) 161/92 (06/19/20 2231)  SpO2: (!) 94 % (06/19/20 2231) Vital Signs (24h Range):  Temp:  [97.6 °F (36.4 °C)-97.7 °F (36.5 °C)] 97.7 °F (36.5 °C)  Pulse:  [] 116  Resp:  [12-39] 22  SpO2:  [91 %-98 %] 94 %  BP: (107-169)/() 161/92     Weight: 64.5 kg (142 lb 3.2 oz)  Body mass index is 24.41 kg/m².    Intake/Output Summary (Last 24 hours) at 6/19/2020 2238  Last data filed at 6/19/2020 2143  Gross per 24 hour   Intake 143.58 ml   Output 1050 ml   Net -906.42 ml      Physical Exam  Constitutional:       General: She is not in acute distress.     Appearance: She is well-developed.   HENT:      Head: Atraumatic.   Eyes:      Conjunctiva/sclera: Conjunctivae normal.   Neck:      Musculoskeletal: Neck supple.   Cardiovascular:      Rate and Rhythm: Tachycardia present. Rhythm irregular.      Heart sounds: Normal heart sounds. No murmur.   Pulmonary:      Effort: Pulmonary effort is normal.      Breath sounds: Normal breath sounds. No wheezing.   Abdominal:      General: Bowel sounds are normal. There is no distension.      Palpations: Abdomen is soft.      Tenderness: There is no abdominal tenderness.   Musculoskeletal: Normal range of motion.         General: No deformity.      Right lower leg: Edema present.      Left lower leg: Edema present.   Neurological:      Mental Status: She is alert and oriented to person, place, and time.         Significant Labs: All pertinent labs within the past 24 hours have been reviewed.    Significant Imaging: I have reviewed all pertinent imaging results/findings within the past 24  hours.      Assessment/Plan:      * Atrial fibrillation with rapid ventricular response  No prior history of atrial fibrillation.  Failed to improve significantly despite two doses of intravenous metoprolol and two doses of intravenous diltiazem.  Started on continuous diltiazem infusion for rate control. [id number] Score of at least 4 points (age, female sex, hypertension).  Patient reports no history of bleeding disorder.  Started on full dose anticoagulation with subcutaneous enoxaparin and switched to oral apixaban.  Rate improved and diltiazem discontinued.  Continue with metoprolol along with digoxin which was started tonight. Echocardiogram revealed severe right atrial enlargement.      Pleural effusion on right  Minimal tobacco smoking history.  No B symptoms to suggest malignancy. History not suggestive of pneumonia.  Procalcitonin negative.  Lactate normal.  Infectious etiology unlikely.  Suspect likely due to new onset of heart failure.  Continue with intravenous furosemide to achieve negative fluid balance.  If does not improve will consider thoracentesis although anticoagulation will need to be held prior to procedure.    Abdominal distention  Improved with bowel regimen.    Essential hypertension  Reasonably controlled with current regimen.  Will continue with current regimen and continue to monitor.      VTE Risk Mitigation (From admission, onward)         Ordered     apixaban tablet 5 mg  2 times daily      06/18/20 2032     IP VTE HIGH RISK PATIENT  Once      06/18/20 1704     Place sequential compression device  Until discontinued      06/18/20 1704                      Juan Jimenez MD  Department of Hospital Medicine   [facility]

## 2020-06-20 NOTE — ASSESSMENT & PLAN NOTE
Minimal tobacco smoking history.  No B symptoms to suggest malignancy. History not suggestive of pneumonia.  Procalcitonin negative.  Lactate normal.  Infectious etiology unlikely.  Suspect likely due to new onset of heart failure.  Continue with intravenous furosemide to achieve negative fluid balance.  If does not improve will consider thoracentesis although anticoagulation will need to be held prior to procedure.
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
## 2020-06-20 NOTE — PROGRESS NOTES
[facility]  Cardiology  Progress Note    Patient Name: Ellen Neves  MRN: [id number]  Admission Date: 6/18/2020  Hospital Length of Stay: 2 days  Code Status: Full Code   Attending Physician: Juan Jimenez MD   Primary Care Physician: Abran Terrazas MD  Expected Discharge Date:   Principal Problem:Atrial fibrillation with rapid ventricular response    Subjective:     Brief HPI:    Ellen Neves is a 80 y.o.female with hypertension. On about 5/20/2020 she had nausea and vomited. She felt weak. At that time she also noted that her heart rate had become elevated. She mostly recorded ar heart rate 100-120 bpm. She continued to record an elevated heart rate over the coming weeks. On about 6/15/2020 she felt increasingly weak and began being short of breath. She noted her abdomen had become mildly distended. She saw Dr. Abran Terrazas on 6/18/2020 and was referred to the ER at [facility] for admission. She was in atrial fibrillation with a VRR of about 150 bpm on presentation. She denies any chest pain.      Hospital Course:    Diltiazem iv and metoprolol po.    Apixiban.    Diuresis.    6/19/2020: Echo: Normal left ventricular size with low normal systolic function. EF 50%. Severely dilated LA. Severely dilated RA. Moderate aortic valve sclerosis. Mild to moderate AR. Moderate MR. Moderate TR. SPAP 56 mmHg.    6/19/2020: Agitation and confusion.    6/19/2020: Digoxin was added.    Interval History:    VRR still on high side running  bpm. Very confused last night pulling out iv and insisting on leaving.    Review of Systems   Unable to perform ROS: mental status change       Objective:     Vital Signs (Most Recent):  Temp: 97.7 °F (36.5 °C) (06/20/20 0705)  Pulse: 104 (06/20/20 0900)  Resp: 14 (06/20/20 0900)  BP: 137/76 (06/20/20 0900)  SpO2: 97 % (06/20/20 0900) Vital Signs (24h Range):  Temp:  [97.2 °F (36.2 °C)-97.7 °F (36.5 °C)] 97.7 °F (36.5 °C)  Pulse:  [] 104  Resp:  [12-36]  14  SpO2:  [91 %-99 %] 97 %  BP: (130-197)/() 137/76     Weight: 64.5 kg (142 lb 3.2 oz)  Body mass index is 24.41 kg/m².    SpO2: 97 %  O2 Device (Oxygen Therapy): nasal cannula      Intake/Output Summary (Last 24 hours) at 6/20/2020 0949  Last data filed at 6/20/2020 0643  Gross per 24 hour   Intake 400 ml   Output 1300 ml   Net -900 ml       Lines/Drains/Airways     Drain            Female External Urinary Catheter 06/19/20 1901 less than 1 day          Peripheral Intravenous Line                 Peripheral IV - Single Lumen 06/20/20 0115 Anterior;Right Forearm less than 1 day                Physical Exam   Constitutional: She appears well-developed and well-nourished. She is sleeping. She appears ill. No distress.   Eyes: Right conjunctiva is not injected. Right conjunctiva has no hemorrhage. Left conjunctiva is not injected. Left conjunctiva has no hemorrhage.   Neck: No JVD present.   Cardiovascular: S1 normal and S2 normal. An irregularly irregular rhythm present. Tachycardia present. Exam reveals no gallop.   Murmur heard.   Midsystolic murmur is present with a grade of 2/6 at the lower left sternal border.  Pulmonary/Chest: Effort normal. She has decreased breath sounds in the right lower field.   Abdominal: Normal appearance. There is no abdominal tenderness.   Musculoskeletal:      Right ankle: She exhibits no swelling.      Left ankle: She exhibits no swelling.   Skin: Skin is warm and dry. No rash noted.       Current Medications:     apixaban  5 mg Oral BID    citalopram  20 mg Oral Daily    furosemide (LASIX) IV  20 mg Intravenous Q12H    lisinopriL  10 mg Oral Daily    metoprolol tartrate  100 mg Oral BID    polyethylene glycol  17 g Oral Daily    potassium chloride  10 mEq Oral BID     Current Laboratory Results:    Recent Results (from the past 24 hour(s))   Echo Color Flow Doppler? Yes; Bubble Contrast? No    Collection Time: 06/19/20 11:08 AM   Result Value Ref Range    BSA 1.71 m2     LA WIDTH 4.21 cm    PV PEAK VELOCITY 0.81 cm/s    LVIDD 3.99 3.5 - 6.0 cm    IVS 0.90 0.6 - 1.1 cm    PW 0.86 0.6 - 1.1 cm    LVIDS 3.18 2.1 - 4.0 cm    FS 20 28 - 44 %    LA volume 93.17 cm3    Sinus 2.57 cm    STJ 2.56 cm    LV mass 105.92 g    LA size 4.11 cm    TAPSE 1.51 cm    Left Ventricle Relative Wall Thickness 0.43 cm    AV mean gradient 4 mmHg    AV valve area 1.73 cm2    AV Velocity Ratio 0.64     AV index (prosthetic) 0.64     MV valve area p 1/2 method 6.99 cm2    E/A ratio 2.02     E wave decelartion time 108.52 msec    IVRT 60.89 msec    Pulm vein S/D ratio 2.08     LVOT diameter 1.86 cm    LVOT area 2.7 cm2    LVOT peak luis enrique 0.84 m/s    LVOT peak VTI 16.60 cm    Ao peak luis enrique 1.31 m/s    Ao VTI 26.03 cm    Mr max luis enrique 0.05 m/s    LVOT stroke volume 45.08 cm3    AV peak gradient 7 mmHg    MV Peak E Luis Enrique 1.15 m/s    TR Max Luis Enrique 3.45 m/s    MV stenosis pressure 1/2 time 31.47 ms    MV Peak A Luis Enrique 0.57 m/s    PV Peak S Luis Enrique 0.52 m/s    PV Peak D Luis Enrique 0.25 m/s    LV Systolic Volume 40.29 mL    LV Systolic Volume Index 23.8 mL/m2    LV Diastolic Volume 69.64 mL    LV Diastolic Volume Index 41.18 mL/m2    LA Volume Index 55.1 mL/m2    LV Mass Index 63 g/m2    RA Major Axis 6.14 cm    Left Atrium Minor Axis 6.52 cm    Left Atrium Major Axis 6.16 cm    Triscuspid Valve Regurgitation Peak Gradient 48 mmHg    LA Volume Index (Mod) 61.5 mL/m2    LA volume (mod) 104.00 cm3    RA Width 3.96 cm    Right Atrial Pressure (from IVC) 8 mmHg    TV rest pulmonary artery pressure 56 mmHg   POCT glucose    Collection Time: 06/20/20  1:28 AM   Result Value Ref Range    POCT Glucose 115 (H) 70 - 110 mg/dL   ISTAT PROCEDURE    Collection Time: 06/20/20  1:44 AM   Result Value Ref Range    POC PH 7.385 7.35 - 7.45    POC PCO2 42.8 35 - 45 mmHg    POC PO2 93 80 - 100 mmHg    POC HCO3 25.6 24 - 28 mmol/L    POC BE 1 -2 to 2 mmol/L    POC SATURATED O2 97 95 - 100 %    Rate 14     Sample ARTERIAL     Site LR     Allens Test N/A      DelSys Nasal Can     Mode SPONT     Flow 3     Sp02 97    Magnesium    Collection Time: 06/20/20  3:38 AM   Result Value Ref Range    Magnesium 1.3 (L) 1.6 - 2.6 mg/dL   Phosphorus    Collection Time: 06/20/20  3:38 AM   Result Value Ref Range    Phosphorus 2.9 2.7 - 4.5 mg/dL   Comprehensive metabolic panel    Collection Time: 06/20/20  3:38 AM   Result Value Ref Range    Sodium 128 (L) 136 - 145 mmol/L    Potassium 4.4 3.5 - 5.1 mmol/L    Chloride 93 (L) 95 - 110 mmol/L    CO2 22 (L) 23 - 29 mmol/L    Glucose 102 70 - 110 mg/dL    BUN, Bld 13 8 - 23 mg/dL    Creatinine 0.7 0.5 - 1.4 mg/dL    Calcium 9.3 8.7 - 10.5 mg/dL    Total Protein 7.5 6.0 - 8.4 g/dL    Albumin 3.5 3.5 - 5.2 g/dL    Total Bilirubin 0.5 0.1 - 1.0 mg/dL    Alkaline Phosphatase 114 55 - 135 U/L    AST 34 10 - 40 U/L    ALT 21 10 - 44 U/L    Anion Gap 13 8 - 16 mmol/L    eGFR if African American >60 >60 mL/min/1.73 m^2    eGFR if non African American >60 >60 mL/min/1.73 m^2   CBC auto differential    Collection Time: 06/20/20  3:39 AM   Result Value Ref Range    WBC 8.66 3.90 - 12.70 K/uL    RBC 3.50 (L) 4.00 - 5.40 M/uL    Hemoglobin 11.4 (L) 12.0 - 16.0 g/dL    Hematocrit 34.9 (L) 37.0 - 48.5 %    Mean Corpuscular Volume 100 (H) 82 - 98 fL    Mean Corpuscular Hemoglobin 32.6 (H) 27.0 - 31.0 pg    Mean Corpuscular Hemoglobin Conc 32.7 32.0 - 36.0 g/dL    RDW 12.3 11.5 - 14.5 %    Platelets 284 150 - 350 K/uL    MPV 10.2 9.2 - 12.9 fL    Immature Granulocytes 0.2 0.0 - 0.5 %    Gran # (ANC) 5.9 1.8 - 7.7 K/uL    Immature Grans (Abs) 0.02 0.00 - 0.04 K/uL    Lymph # 1.7 1.0 - 4.8 K/uL    Mono # 0.8 0.3 - 1.0 K/uL    Eos # 0.2 0.0 - 0.5 K/uL    Baso # 0.04 0.00 - 0.20 K/uL    nRBC 0 0 /100 WBC    Gran% 68.3 38.0 - 73.0 %    Lymph% 20.0 18.0 - 48.0 %    Mono% 8.9 4.0 - 15.0 %    Eosinophil% 2.1 0.0 - 8.0 %    Basophil% 0.5 0.0 - 1.9 %    Differential Method Automated      Current Imaging Results:    X-Ray Chest AP Portable   Final Result       Abnormal findings within the right lower thorax consistent with effusion/infiltrate along with cardiomegaly.         Electronically signed by: Sulaiman Gomez MD   Date:    06/18/2020   Time:    15:09      X-Ray Abdomen Flat And Erect   Final Result      Findings within the included right lower thorax suggestive of effusion and postinflammatory changes as well as cardiomegaly.  Incidental osseous findings with otherwise flat and erect views of the abdomen.         Electronically signed by: Sulaiman Gomez MD   Date:    06/18/2020   Time:    15:08          6/19/2020: Echo:    Low normal left ventricular systolic function. The estimated ejection fraction is 50%.  Concentric left ventricular remodeling.  Diastolic pattern consistent with atrial fibrillation observed.  Severe left atrial enlargement.  Normal right ventricular systolic function.  Severe right atrial enlargement.  Moderate aortic valve sclerosis.  Mild-to-moderate aortic regurgitation.  Moderate mitral regurgitation.  Moderate tricuspid regurgitation.  Moderate pulmonary hypertension present.  The estimated PA systolic pressure is 56 mmHg.  Intermediate central venous pressure (8 mmHg).      Assessment and Plan:     Problem List:    Active Diagnoses:    Diagnosis Date Noted POA    PRINCIPAL PROBLEM:  Atrial fibrillation with rapid ventricular response [I48.91] 06/18/2020 Yes    Pleural effusion on right [J90] 06/18/2020 Yes    Abdominal distention [R14.0] 06/18/2020 Yes    Essential hypertension [I10] 07/16/2012 Yes      Problems Resolved During this Admission:     Assessment and Plan:     1. Atrial Fibrillation              5/20/2020: Clinical onset of persistent atrial fibrillation.              6/18/2020: Presents with fast VRR. Received diltiazem iv.               [id number]=0 (CHA2Sc).   6/19/2020: Began digoxin.              On metoprolol 100 mg Q12 and digoxin 0.125 mg Q24.              Anticoagulation with apixiban.              Give  additional digoxin iv              May considerr RAYSA guided CV in the future but likelihood on long term success in rhythm control strategy will be low.     2. Chronic Anticoagulation              [id number]=4 (CHA2Sc).              Anticoagulation with apixiban.              Apixiban 5 mg Q12.   No aspirin or NSAID.     3. Heart Failure, Diastolic, Acute   6/19/2020: Echo: Normal left ventricular size with low normal systolic function. EF 50%. Severely dilated LA. Severely dilated RA. Moderate aortic valve sclerosis. Mild to moderate AR. Moderate MR. Moderate TR. SPAP 56 mmHg.   On lisinopril 10 mg Q24.   Will change furosemide to 20 mg Q24.    4. Mitral Regurgitation   6/19/2020: Echo: Moderate MR.    5. Pulmonary Hypertension   6/19/2020: Echo: SPAP 56 mmHg.               6. Hypertension              At home been on nadolol 40 mg Q12 and lisinopril 40 mg Q24.   On metoprolol 100 mg Q12.   Reduce lisinopril to 10 mg Q24.    7. Hyponatremia   6/20/2020: Na 128.   Fluid restriction.      8. Migraine               Occasional tramadol.    9. Confusion   6/19/2020: Agitation and confusion.    10. Primary Care              Dr. Abran Terrazas.      VTE Risk Mitigation (From admission, onward)         Ordered     apixaban tablet 5 mg  2 times daily      06/18/20 2032     IP VTE HIGH RISK PATIENT  Once      06/18/20 1704     Place sequential compression device  Until discontinued      06/18/20 1704                Donnell Mckeon MD  Cardiology  [facility]

## 2020-06-21 PROBLEM — R14.0 ABDOMINAL DISTENTION: Status: RESOLVED | Noted: 2020-06-18 | Resolved: 2020-06-21

## 2020-06-21 PROBLEM — R53.81 DEBILITY: Status: ACTIVE | Noted: 2020-06-21

## 2020-06-21 LAB
ALBUMIN SERPL BCP-MCNC: 3.1 G/DL (ref 3.5–5.2)
ALP SERPL-CCNC: 103 U/L (ref 55–135)
ALT SERPL W/O P-5'-P-CCNC: 21 U/L (ref 10–44)
ANION GAP SERPL CALC-SCNC: 13 MMOL/L (ref 8–16)
AST SERPL-CCNC: 30 U/L (ref 10–40)
BASOPHILS # BLD AUTO: 0.05 K/UL (ref 0–0.2)
BASOPHILS NFR BLD: 0.9 % (ref 0–1.9)
BILIRUB SERPL-MCNC: 0.5 MG/DL (ref 0.1–1)
BUN SERPL-MCNC: 9 MG/DL (ref 8–23)
CALCIUM SERPL-MCNC: 9.1 MG/DL (ref 8.7–10.5)
CHLORIDE SERPL-SCNC: 96 MMOL/L (ref 95–110)
CO2 SERPL-SCNC: 22 MMOL/L (ref 23–29)
CREAT SERPL-MCNC: 0.6 MG/DL (ref 0.5–1.4)
DIFFERENTIAL METHOD: ABNORMAL
EOSINOPHIL # BLD AUTO: 0.2 K/UL (ref 0–0.5)
EOSINOPHIL NFR BLD: 3.4 % (ref 0–8)
ERYTHROCYTE [DISTWIDTH] IN BLOOD BY AUTOMATED COUNT: 12.3 % (ref 11.5–14.5)
EST. GFR  (AFRICAN AMERICAN): >60 ML/MIN/1.73 M^2
EST. GFR  (NON AFRICAN AMERICAN): >60 ML/MIN/1.73 M^2
GLUCOSE SERPL-MCNC: 88 MG/DL (ref 70–110)
HCT VFR BLD AUTO: 39.5 % (ref 37–48.5)
HGB BLD-MCNC: 12.6 G/DL (ref 12–16)
IMM GRANULOCYTES # BLD AUTO: 0.02 K/UL (ref 0–0.04)
IMM GRANULOCYTES NFR BLD AUTO: 0.4 % (ref 0–0.5)
LYMPHOCYTES # BLD AUTO: 2.1 K/UL (ref 1–4.8)
LYMPHOCYTES NFR BLD: 37.3 % (ref 18–48)
MAGNESIUM SERPL-MCNC: 1.5 MG/DL (ref 1.6–2.6)
MCH RBC QN AUTO: 32 PG (ref 27–31)
MCHC RBC AUTO-ENTMCNC: 31.9 G/DL (ref 32–36)
MCV RBC AUTO: 100 FL (ref 82–98)
MONOCYTES # BLD AUTO: 0.8 K/UL (ref 0.3–1)
MONOCYTES NFR BLD: 14.2 % (ref 4–15)
NEUTROPHILS # BLD AUTO: 2.4 K/UL (ref 1.8–7.7)
NEUTROPHILS NFR BLD: 43.8 % (ref 38–73)
NRBC BLD-RTO: 0 /100 WBC
OSMOLALITY SERPL: 274 MOSM/KG (ref 275–295)
PHOSPHATE SERPL-MCNC: 3.2 MG/DL (ref 2.7–4.5)
PLATELET # BLD AUTO: 282 K/UL (ref 150–350)
PMV BLD AUTO: 9.9 FL (ref 9.2–12.9)
POTASSIUM SERPL-SCNC: 3.6 MMOL/L (ref 3.5–5.1)
PROT SERPL-MCNC: 6.9 G/DL (ref 6–8.4)
RBC # BLD AUTO: 3.94 M/UL (ref 4–5.4)
SODIUM SERPL-SCNC: 131 MMOL/L (ref 136–145)
WBC # BLD AUTO: 5.55 K/UL (ref 3.9–12.7)

## 2020-06-21 PROCEDURE — 36415 COLL VENOUS BLD VENIPUNCTURE: CPT

## 2020-06-21 PROCEDURE — 25000003 PHARM REV CODE 250: Performed by: SURGERY

## 2020-06-21 PROCEDURE — 99233 SBSQ HOSP IP/OBS HIGH 50: CPT | Mod: ,,, | Performed by: HOSPITALIST

## 2020-06-21 PROCEDURE — 11000001 HC ACUTE MED/SURG PRIVATE ROOM

## 2020-06-21 PROCEDURE — 83735 ASSAY OF MAGNESIUM: CPT

## 2020-06-21 PROCEDURE — 63600175 PHARM REV CODE 636 W HCPCS: Performed by: HOSPITALIST

## 2020-06-21 PROCEDURE — 99233 SBSQ HOSP IP/OBS HIGH 50: CPT | Mod: ,,, | Performed by: INTERNAL MEDICINE

## 2020-06-21 PROCEDURE — 85025 COMPLETE CBC W/AUTO DIFF WBC: CPT

## 2020-06-21 PROCEDURE — 99233 PR SUBSEQUENT HOSPITAL CARE,LEVL III: ICD-10-PCS | Mod: ,,, | Performed by: HOSPITALIST

## 2020-06-21 PROCEDURE — 94761 N-INVAS EAR/PLS OXIMETRY MLT: CPT

## 2020-06-21 PROCEDURE — 27000221 HC OXYGEN, UP TO 24 HOURS

## 2020-06-21 PROCEDURE — 84100 ASSAY OF PHOSPHORUS: CPT

## 2020-06-21 PROCEDURE — 80053 COMPREHEN METABOLIC PANEL: CPT

## 2020-06-21 PROCEDURE — 83930 ASSAY OF BLOOD OSMOLALITY: CPT

## 2020-06-21 PROCEDURE — 25000003 PHARM REV CODE 250: Performed by: HOSPITALIST

## 2020-06-21 PROCEDURE — 99233 PR SUBSEQUENT HOSPITAL CARE,LEVL III: ICD-10-PCS | Mod: ,,, | Performed by: INTERNAL MEDICINE

## 2020-06-21 PROCEDURE — 25000003 PHARM REV CODE 250: Performed by: INTERNAL MEDICINE

## 2020-06-21 RX ORDER — LISINOPRIL 20 MG/1
40 TABLET ORAL DAILY
Status: DISCONTINUED | OUTPATIENT
Start: 2020-06-22 | End: 2020-06-22 | Stop reason: HOSPADM

## 2020-06-21 RX ORDER — DIGOXIN 125 MCG
0.25 TABLET ORAL DAILY
Status: DISCONTINUED | OUTPATIENT
Start: 2020-06-21 | End: 2020-06-22 | Stop reason: HOSPADM

## 2020-06-21 RX ORDER — LISINOPRIL 20 MG/1
20 TABLET ORAL ONCE
Status: COMPLETED | OUTPATIENT
Start: 2020-06-21 | End: 2020-06-21

## 2020-06-21 RX ORDER — MAGNESIUM SULFATE HEPTAHYDRATE 40 MG/ML
2 INJECTION, SOLUTION INTRAVENOUS ONCE
Status: COMPLETED | OUTPATIENT
Start: 2020-06-21 | End: 2020-06-21

## 2020-06-21 RX ORDER — SPIRONOLACTONE 25 MG/1
25 TABLET ORAL DAILY
Status: DISCONTINUED | OUTPATIENT
Start: 2020-06-21 | End: 2020-06-22 | Stop reason: HOSPADM

## 2020-06-21 RX ADMIN — METOPROLOL TARTRATE 100 MG: 50 TABLET, FILM COATED ORAL at 08:06

## 2020-06-21 RX ADMIN — APIXABAN 5 MG: 2.5 TABLET, FILM COATED ORAL at 08:06

## 2020-06-21 RX ADMIN — POLYETHYLENE GLYCOL 3350 17 G: 17 POWDER, FOR SOLUTION ORAL at 08:06

## 2020-06-21 RX ADMIN — MAGNESIUM SULFATE IN WATER 2 G: 40 INJECTION, SOLUTION INTRAVENOUS at 08:06

## 2020-06-21 RX ADMIN — FUROSEMIDE 20 MG: 20 TABLET ORAL at 08:06

## 2020-06-21 RX ADMIN — LISINOPRIL 20 MG: 20 TABLET ORAL at 08:06

## 2020-06-21 RX ADMIN — DIGOXIN 0.12 MG: 125 TABLET ORAL at 08:06

## 2020-06-21 RX ADMIN — ACETAMINOPHEN 1000 MG: 500 TABLET ORAL at 09:06

## 2020-06-21 RX ADMIN — LISINOPRIL 20 MG: 20 TABLET ORAL at 10:06

## 2020-06-21 RX ADMIN — CITALOPRAM HYDROBROMIDE 20 MG: 20 TABLET ORAL at 08:06

## 2020-06-21 RX ADMIN — ACETAMINOPHEN 1000 MG: 500 TABLET ORAL at 06:06

## 2020-06-21 RX ADMIN — TRAMADOL HYDROCHLORIDE 50 MG: 50 TABLET, FILM COATED ORAL at 03:06

## 2020-06-21 RX ADMIN — DIGOXIN 0.25 MG: 125 TABLET ORAL at 03:06

## 2020-06-21 RX ADMIN — TEMAZEPAM 7.5 MG: 7.5 CAPSULE ORAL at 09:06

## 2020-06-21 RX ADMIN — TRAMADOL HYDROCHLORIDE 50 MG: 50 TABLET, FILM COATED ORAL at 08:06

## 2020-06-21 RX ADMIN — SPIRONOLACTONE 25 MG: 25 TABLET ORAL at 03:06

## 2020-06-21 NOTE — PLAN OF CARE
Problem: Physical Therapy Goal  Goal: Physical Therapy Goal  Outcome: Met     Patient evaluated and no acute care PT goals identified. Patient ambulating with supervision-independence on 1-2 L NC with SpO2 maintained >94% and HR range from 105-128 bpm. Ascended/descended x4 steps with unilateral HR with SBA, limited in further stairs by lines and requiring SBA due to impaired vision.    During this hospitalization, patient does not require further acute PT services.  Please re-consult if situation changes.  Recommendation for d/c to home with cardiac rehab if acute HF qualifying diagnosis vs HH therapy.

## 2020-06-21 NOTE — PROGRESS NOTES
[facility] Hospital Medicine  Progress Note    Patient Name: Ellen Neves  MRN: [id number]  Patient Class: IP- Inpatient   Admission Date: 6/18/2020  Length of Stay: 3 days  Attending Physician: Juan Jimenez MD  Primary Care Provider: Abran Terrazas MD        Subjective:     Principal Problem:Atrial fibrillation with rapid ventricular response      HPI:  Patient is 80-year-old woman with longstanding history of hypertension who was sent to the emergency department by her primary care provider Dr. Abran Terrazas due to dyspnea with new onset of atrial fibrillation rapid ventricular response.  Patient reports that she has had progressive shortness of breath for the last 2 days but notice change initially about 2 weeks ago when she had some shortness of breath at that time.  She denies any chest pain.  She denies any fevers or chills.  She reports noticing progressively worsening abdominal distention but denies any abdominal pain, nausea, or vomiting.  She reports she has chronic constipation has not had a bowel movement since Monday.  She reports smoking tobacco in college but otherwise been tobacco free.  She does drink about 3 drinks of wine per day.  Her family reports that this is a conservative estimate.  She denies any history of alcohol withdrawal.    Overview/Hospital Course:  Patient is 80-year-old woman with hypertension admitted to the intensive care unit for treatment of new onset of atrial fibrillation with rapid ventricular response and for evaluation of a right pleural effusion likely due to heart failure.  Patient started on a continuous diltiazem infusion to control her rapid response.  Rate improved she was transitioned to oral metoprolol.  Despite this her pulse remained elevated and required additional doses of AV bernadette blockade.  Patient was loaded with digoxin.  She had some confusion agitation last night however improved today.  Echocardiogram shows evidence of modest  reduction in systolic function but with severely dilated left atrium and severely dilated right atrium with mild to moderate diffuse valvular disease.    Interval History:  No confusion overnight.  Shortness of breath improving.    Review of Systems   Constitutional: Negative for chills and fever.   Respiratory: Positive for shortness of breath. Negative for wheezing.    Cardiovascular: Negative for chest pain.   Gastrointestinal: Negative for abdominal distention, abdominal pain, constipation, diarrhea, nausea and vomiting.   Genitourinary: Negative for dysuria and frequency.   Musculoskeletal: Negative for arthralgias and myalgias.   Neurological: Negative for light-headedness.   Psychiatric/Behavioral: Negative for agitation and confusion.     Objective:     Vital Signs (Most Recent):  Temp: 97.8 °F (36.6 °C) (06/21/20 0729)  Pulse: (!) 129 (06/21/20 0800)  Resp: 20 (06/21/20 0729)  BP: (!) 176/99 (06/21/20 0729)  SpO2: 96 % (06/21/20 0729) Vital Signs (24h Range):  Temp:  [97.5 °F (36.4 °C)-98.1 °F (36.7 °C)] 97.8 °F (36.6 °C)  Pulse:  [] 129  Resp:  [13-20] 20  SpO2:  [90 %-98 %] 96 %  BP: (133-176)/() 176/99     Weight: 62.5 kg (137 lb 12.6 oz)  Body mass index is 23.65 kg/m².    Intake/Output Summary (Last 24 hours) at 6/21/2020 0859  Last data filed at 6/21/2020 0712  Gross per 24 hour   Intake 150 ml   Output 1900 ml   Net -1750 ml      Physical Exam  Constitutional:       General: She is not in acute distress.     Appearance: She is well-developed.   HENT:      Head: Atraumatic.   Eyes:      Conjunctiva/sclera: Conjunctivae normal.   Neck:      Musculoskeletal: Neck supple.   Cardiovascular:      Rate and Rhythm: Tachycardia present. Rhythm irregular.      Heart sounds: Normal heart sounds. No murmur.   Pulmonary:      Effort: Pulmonary effort is normal.      Breath sounds: Normal breath sounds. No wheezing.   Abdominal:      General: Bowel sounds are normal. There is no distension.       Palpations: Abdomen is soft.      Tenderness: There is no abdominal tenderness.   Musculoskeletal: Normal range of motion.         General: No deformity.      Right lower leg: No edema.      Left lower leg: No edema.   Neurological:      Mental Status: She is alert and oriented to person, place, and time.         Significant Labs: All pertinent labs within the past 24 hours have been reviewed.    Significant Imaging: I have reviewed all pertinent imaging results/findings within the past 24 hours.      Assessment/Plan:      * Atrial fibrillation with rapid ventricular response  No prior history of atrial fibrillation.  Failed to improve significantly despite two doses of intravenous metoprolol and two doses of intravenous diltiazem.  Started on continuous diltiazem infusion for rate control. [id number] Score of at least 4 points (age, female sex, hypertension).  Patient reports no history of bleeding disorder.  Started on full dose anticoagulation with subcutaneous enoxaparin and switched to oral apixaban.  Rate improved and diltiazem discontinued.  Patient transition to oral metoprolol and also started digoxin.  Rate more recently well controlled.  Echocardiogram revealed severe right atrial enlargement.      Pleural effusion on right  Minimal tobacco smoking history.  No B symptoms to suggest malignancy. History not suggestive of pneumonia.  Procalcitonin negative.  Lactate normal.  Infectious etiology unlikely.  Suspect likely due to new onset of heart failure.  Continue with intravenous furosemide to achieve negative fluid balance.  If does not improve will consider thoracentesis although anticoagulation will need to be held prior to procedure.    Essential hypertension  Increase lisinopril to 40 mg oral daily along with diuretic therapy and metoprolol.    Debility  Consult physical and occupational therapy.      Macular degeneration  Stable.  Will restart home does of I vitamins and mineral supplements  (PreserVision).      VTE Risk Mitigation (From admission, onward)         Ordered     apixaban tablet 5 mg  2 times daily      06/18/20 2032     IP VTE HIGH RISK PATIENT  Once      06/18/20 1704     Place sequential compression device  Until discontinued      06/18/20 1704                Juan Jimenez MD  Department of Hospital Medicine   [facility]

## 2020-06-21 NOTE — PROGRESS NOTES
[facility]  Cardiology  Progress Note    Patient Name: Ellen Neves  MRN: [id number]  Admission Date: 6/18/2020  Hospital Length of Stay: 3 days  Code Status: Full Code   Attending Physician: Juan Jimenez MD   Primary Care Physician: Abran Terrazas MD  Expected Discharge Date:   Principal Problem:Atrial fibrillation with rapid ventricular response    Subjective:     Brief HPI:    Ellen Neves is a 80 y.o.female with hypertension. On about 5/20/2020 she had nausea and vomited. She felt weak. At that time she also noted that her heart rate had become elevated. She mostly recorded ar heart rate 100-120 bpm. She continued to record an elevated heart rate over the coming weeks. On about 6/15/2020 she felt increasingly weak and began being short of breath. She noted her abdomen had become mildly distended. She saw Dr. Abran Terrazas on 6/18/2020 and was referred to the ER at [facility] for admission. She was in atrial fibrillation with a VRR of about 150 bpm on presentation. She denies any chest pain.      Hospital Course:    Diltiazem iv and metoprolol po.    Apixiban.    Diuresis.    6/19/2020: Echo: Normal left ventricular size with low normal systolic function. EF 50%. Severely dilated LA. Severely dilated RA. Moderate aortic valve sclerosis. Mild to moderate AR. Moderate MR. Moderate TR. SPAP 56 mmHg.    6/19/2020: Agitation and confusion.    6/19/2020: Digoxin was added.    Interval History:    VRR still on high side running 100-110 bpm at rest. About 120 bpm with ambulation. Confusion resolved.    Breathing well. No palpitations or chest pain    Review of Systems   Constitution: Negative for chills, fever and malaise/fatigue.   HENT: Negative for nosebleeds.    Eyes: Negative for vision loss in left eye and vision loss in right eye.   Cardiovascular: Negative for chest pain, leg swelling, orthopnea, palpitations and paroxysmal nocturnal dyspnea.   Respiratory: Negative for cough,  hemoptysis, shortness of breath, sputum production and wheezing.    Hematologic/Lymphatic: Negative for bleeding problem.   Skin: Negative for rash.   Musculoskeletal: Negative for myalgias.   Gastrointestinal: Negative for abdominal pain, heartburn, hematemesis, hematochezia, jaundice, melena, nausea and vomiting.   Genitourinary: Negative for hematuria.   Neurological: Negative for dizziness, headaches, light-headedness, vertigo and weakness.   Psychiatric/Behavioral: Negative for altered mental status. The patient is not nervous/anxious.    Allergic/Immunologic: Negative for persistent infections.       Objective:     Vital Signs (Most Recent):  Temp: 97.8 °F (36.6 °C) (06/21/20 0729)  Pulse: (!) 129 (06/21/20 0800)  Resp: 20 (06/21/20 0729)  BP: (!) 176/99 (06/21/20 0729)  SpO2: 96 % (06/21/20 0729) Vital Signs (24h Range):  Temp:  [97.5 °F (36.4 °C)-98.1 °F (36.7 °C)] 97.8 °F (36.6 °C)  Pulse:  [] 129  Resp:  [13-20] 20  SpO2:  [90 %-98 %] 96 %  BP: (139-176)/() 176/99     Weight: 62.5 kg (137 lb 12.6 oz)  Body mass index is 23.65 kg/m².    SpO2: 96 %  O2 Device (Oxygen Therapy): nasal cannula      Intake/Output Summary (Last 24 hours) at 6/21/2020 1106  Last data filed at 6/21/2020 0712  Gross per 24 hour   Intake 20 ml   Output 1400 ml   Net -1380 ml       Lines/Drains/Airways     Drain            Female External Urinary Catheter 06/19/20 1901 1 day          Peripheral Intravenous Line                 Peripheral IV - Single Lumen 06/20/20 0115 Anterior;Right Forearm 1 day                Physical Exam   Constitutional: She appears well-developed and well-nourished. She is sleeping. She appears ill. No distress.   Eyes: Right conjunctiva is not injected. Right conjunctiva has no hemorrhage. Left conjunctiva is not injected. Left conjunctiva has no hemorrhage.   Neck: No JVD present.   Cardiovascular: S1 normal and S2 normal. An irregularly irregular rhythm present. Tachycardia present. Exam reveals  no gallop.   Murmur heard.  High-pitched blowing holosystolic murmur is present with a grade of 2/6 at the apex.  Pulmonary/Chest: Effort normal. She has decreased breath sounds in the right lower field.   Abdominal: Normal appearance. There is no abdominal tenderness.   Musculoskeletal:      Right ankle: She exhibits no swelling.      Left ankle: She exhibits no swelling.   Skin: Skin is warm and dry. No rash noted.   Psychiatric: She has a normal mood and affect. Her speech is normal. Cognition and memory are normal.       Current Medications:     apixaban  5 mg Oral BID    citalopram  20 mg Oral Daily    digoxin  0.125 mg Oral Daily    furosemide  20 mg Oral Daily    [START ON 6/22/2020] lisinopriL  40 mg Oral Daily    metoprolol tartrate  100 mg Oral BID    NON FORMULARY MEDICATION 2 each  2 each Oral Daily    polyethylene glycol  17 g Oral Daily    temazepam  7.5 mg Oral QHS     Current Laboratory Results:    Recent Results (from the past 24 hour(s))   Magnesium    Collection Time: 06/21/20  4:25 AM   Result Value Ref Range    Magnesium 1.5 (L) 1.6 - 2.6 mg/dL   Phosphorus    Collection Time: 06/21/20  4:25 AM   Result Value Ref Range    Phosphorus 3.2 2.7 - 4.5 mg/dL   CBC auto differential    Collection Time: 06/21/20  4:25 AM   Result Value Ref Range    WBC 5.55 3.90 - 12.70 K/uL    RBC 3.94 (L) 4.00 - 5.40 M/uL    Hemoglobin 12.6 12.0 - 16.0 g/dL    Hematocrit 39.5 37.0 - 48.5 %    Mean Corpuscular Volume 100 (H) 82 - 98 fL    Mean Corpuscular Hemoglobin 32.0 (H) 27.0 - 31.0 pg    Mean Corpuscular Hemoglobin Conc 31.9 (L) 32.0 - 36.0 g/dL    RDW 12.3 11.5 - 14.5 %    Platelets 282 150 - 350 K/uL    MPV 9.9 9.2 - 12.9 fL    Immature Granulocytes 0.4 0.0 - 0.5 %    Gran # (ANC) 2.4 1.8 - 7.7 K/uL    Immature Grans (Abs) 0.02 0.00 - 0.04 K/uL    Lymph # 2.1 1.0 - 4.8 K/uL    Mono # 0.8 0.3 - 1.0 K/uL    Eos # 0.2 0.0 - 0.5 K/uL    Baso # 0.05 0.00 - 0.20 K/uL    nRBC 0 0 /100 WBC    Gran% 43.8 38.0 -  73.0 %    Lymph% 37.3 18.0 - 48.0 %    Mono% 14.2 4.0 - 15.0 %    Eosinophil% 3.4 0.0 - 8.0 %    Basophil% 0.9 0.0 - 1.9 %    Differential Method Automated    Comprehensive metabolic panel    Collection Time: 06/21/20  4:25 AM   Result Value Ref Range    Sodium 131 (L) 136 - 145 mmol/L    Potassium 3.6 3.5 - 5.1 mmol/L    Chloride 96 95 - 110 mmol/L    CO2 22 (L) 23 - 29 mmol/L    Glucose 88 70 - 110 mg/dL    BUN, Bld 9 8 - 23 mg/dL    Creatinine 0.6 0.5 - 1.4 mg/dL    Calcium 9.1 8.7 - 10.5 mg/dL    Total Protein 6.9 6.0 - 8.4 g/dL    Albumin 3.1 (L) 3.5 - 5.2 g/dL    Total Bilirubin 0.5 0.1 - 1.0 mg/dL    Alkaline Phosphatase 103 55 - 135 U/L    AST 30 10 - 40 U/L    ALT 21 10 - 44 U/L    Anion Gap 13 8 - 16 mmol/L    eGFR if African American >60 >60 mL/min/1.73 m^2    eGFR if non African American >60 >60 mL/min/1.73 m^2   Osmolality, Serum    Collection Time: 06/21/20  4:25 AM   Result Value Ref Range    Osmolality 274 (L) 275 - 295 mOsm/kg     Current Imaging Results:    X-Ray Chest AP Portable   Final Result      Abnormal findings within the right lower thorax consistent with effusion/infiltrate along with cardiomegaly.         Electronically signed by: Sulaiman Gomez MD   Date:    06/18/2020   Time:    15:09      X-Ray Abdomen Flat And Erect   Final Result      Findings within the included right lower thorax suggestive of effusion and postinflammatory changes as well as cardiomegaly.  Incidental osseous findings with otherwise flat and erect views of the abdomen.         Electronically signed by: Sulaiman Gomez MD   Date:    06/18/2020   Time:    15:08          6/19/2020: Echo:    Low normal left ventricular systolic function. The estimated ejection fraction is 50%.  Concentric left ventricular remodeling.  Diastolic pattern consistent with atrial fibrillation observed.  Severe left atrial enlargement.  Normal right ventricular systolic function.  Severe right atrial enlargement.  Moderate aortic valve  sclerosis.  Mild-to-moderate aortic regurgitation.  Moderate mitral regurgitation.  Moderate tricuspid regurgitation.  Moderate pulmonary hypertension present.  The estimated PA systolic pressure is 56 mmHg.  Intermediate central venous pressure (8 mmHg).      Assessment and Plan:     Problem List:    Active Diagnoses:    Diagnosis Date Noted POA    PRINCIPAL PROBLEM:  Atrial fibrillation with rapid ventricular response [I48.91] 06/18/2020 Yes    Debility [R53.81] 06/21/2020 Yes    Pleural effusion on right [J90] 06/18/2020 Yes    Macular degeneration [H35.30] 04/01/2014 Yes    Essential hypertension [I10] 07/16/2012 Yes      Problems Resolved During this Admission:    Diagnosis Date Noted Date Resolved POA    Abdominal distention [R14.0] 06/18/2020 06/21/2020 Yes     Assessment and Plan:     1. Atrial Fibrillation              5/20/2020: Clinical onset of persistent atrial fibrillation.              6/18/2020: Presents with fast VRR. Received diltiazem iv.               [id number]=4 (CHA2Sc).   6/19/2020: Began digoxin.              On metoprolol 100 mg Q12 and digoxin 0.125 mg Q24.              Anticoagulation with apixiban.   6/21/2020: VRR on high side.   6/21/2020: Could possibly consider RAYSA guided CV in the future but likelihood on long term success with rhythm control strategy would be low. Long discussion with patient about options. 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 We decided on a rate control strategy at least for now which I agree is very reasonable.              Give additional digoxin and increase digoxin to 0.25 mg Q24.                 2. Chronic Anticoagulation              [id number]=3 (CHA2Sc).              Anticoagulation with apixiban.              Apixiban 5 mg Q12.   No aspirin or NSAID.     3. Heart Failure, Diastolic, Acute   6/19/2020: Echo: Normal left ventricular size with low normal systolic  function. EF 50%. Severely dilated LA. Severely dilated RA. Moderate aortic valve sclerosis. Mild to moderate AR. Moderate MR. Moderate TR. SPAP 56 mmHg.    6/18/2020: Presented in HF. Received furosemide 20 mg iv Q12.   On lisinopril 40 mg Q24.   On furosemide 20 mg Q24.   Will add spironolactone 25 mg Q24.   6/22/2020: Plan follow up CXR and BNP.    4. Mitral Regurgitation   6/19/2020: Echo: Moderate MR.    5. Pulmonary Hypertension   6/19/2020: Echo: SPAP 56 mmHg.               6. Hypertension              At home been on nadolol 40 mg Q12 and lisinopril 40 mg Q24.   On metoprolol 100 mg Q12, lisinopril 40 mg Q24, furosemide 20 mg Q24 and spironolactone 25 mg Q24.    7. Hyponatremia   6/20/2020: Na 128.   Fluid restriction.   Improving.      8. Migraine               Occasional tramadol.    9. Confusion   6/19/2020: Agitation and confusion.   Resolved.    10. Primary Care              Dr. Abran Terrazas.      VTE Risk Mitigation (From admission, onward)         Ordered     apixaban tablet 5 mg  2 times daily      06/18/20 2032     IP VTE HIGH RISK PATIENT  Once      06/18/20 1704     Place sequential compression device  Until discontinued      06/18/20 1704                Donnell Mckeon MD  Cardiology  [facility]

## 2020-06-21 NOTE — PT/OT/SLP EVAL
Physical Therapy Evaluation and Discharge Note    Patient Name:  Ellen Neves   MRN:  [id number]    Recommendations:     Discharge Recommendations:  cardiac rehab, home health PT   Discharge Equipment Recommendations: none, other (see comments)(O2 per RT)   Barriers to discharge: None    Assessment:     Ellen Neves is a 80 y.o. female admitted with a medical diagnosis of Atrial fibrillation with rapid ventricular response with noted EF of 50% with acute HF. Pmhx significant for HTN and macular degeneration. She presents with the following impairments/functional limitations:  visual deficits, impaired cardiopulmonary response to activity.    Patient evaluated and no acute care PT goals identified. Patient ambulating with supervision-independence on 1-2 L NC with SpO2 maintained >94% and HR range from 105-128 bpm. Ascended/descended x4 steps with unilateral HR with SBA, limited in further stairs by lines and requiring SBA due to impaired vision.     During this hospitalization, patient does not require further acute PT services.  Please re-consult if situation changes.  Recommendation for d/c to home with cardiac rehab if acute HF qualifying diagnosis vs HH therapy.    Recent Surgery: * No surgery found *      Plan:     During this hospitalization, patient does not require further acute PT services.  Please re-consult if situation changes.      Subjective     Chief Complaint: Headache due to poor night of sleep, hoping to go home today - reports SOB much better than when admitted  Patient/Family Comments/goals: To go home today and not have to have IP rehab; Patient agreeable to evaluation.  Pain/Comfort:  · Pain Rating 1: 0/10(headache due to poor night of sleep)  · Pain Rating Post-Intervention 1: (headache unchanged)    Patients cultural, spiritual, Restorationism conflicts given the current situation: no    Living Environment:  · Pt lives with her  in a 2 story home with no steps to enter and all living  areas on 2nd floor with ~18 steps with unilateral handrail(s).   · Upon discharge, patient will have assistance from her  and daughter(s).  Prior level of function:  · Ambulation: Indep  · ADL's: Indep  · IADLs: Indep  · Vision impaired due to macular degeneration, but does the cooking at home  · Enjoyed working with  prior to covid  · Hobbies include reading, watching TV, and walking at park (lives off of park)  · Equipment used at home: none.  DME owned (not currently used): none.      Objective:     Communicated with FABIOLA Chen prior to session.  Patient found HOB elevated with peripheral IV, oxygen upon PT entry to room.    General Precautions: Standard, vision impaired   Orthopedic Precautions:    Braces: N/A     Patient donned yellow socks and gait belt for OOB mobility.    Exams:  · Cognition:   · Patient is oriented x4.  · Pt follows approximately 100% of multistep commands.    · Mood: Pleasant and cooperative.   · Safety Awareness: Good  · Musculoskeletal:  · BMI: 23.7  · Posture:  Forward head  · LE ROM/Strength:   · R ROM: WNL  · L ROM: WNL  · R Strength:   · Hip flexion: 5/5  · Knee extension: 5/5  · Dorsiflexion: 5/5   · L Strength:   · Hip flexion: 5/5  · Knee extension: 5/5  · Dorsiflexion: 5/5   · Neuromuscular:  · Sensation: Intact to light touch bilateral LEs.   · Tone/Reflexes: No impairments identified with functional mobility. No formal testing performed.   · Coordination:  · Heel to shin: Intact  · Balance:   · Static sitting: Normal  · Static standing: Good  · Dynamic standing: Good   · Tinetti score of 24/28 indicates patient at low risk for falls without use of AD  · Visual-vestibular: Pt reports impaired vision due to macular degeneration, appears to have decreased depth perception on stairs  · Integument: Visible skin intact  · Cardiopulmonary:  · O2 Requirements:1- 2 L (o2 titration protocol)  · Vital signs: notable variability of HR with jumps of 10 bpm with no  change in activity  · Pre(supine):  SpO2 95%  · During(gait): -128 SpO2 95-97%  · Post(sitting):  SpO2 97% (on 1 L)  · Edema: No pitting edema noted      Functional Mobility:  · Bed Mobility:     · Supine to Sit: modified independence  · Transfers:  Sit to Stand:  modified independence with no AD  · Gait: 2x120 ft with no AD and supervision.   · At times variable foot placement and slight pathway deviation, pt reports baseline.   · Able to maintian conversation while ambulating with no increased SOB.  · Stairs:  Pt ascended/descended 4 stair(s) with No Assistive Device with left handrail with Stand-by Assistance.   · Ascended with reciprocal pattern, descended with step to with pt reporting difficulty seeing edge of steps    Outcome Measure  Tinetti Performance Oriented Mobility Assessment (MARYANN)    Gabbie ME, Rod TF, Tristin R, Fall Risk Index for elderly patients based on number of chronic disabilities.Am J Med 1986:80:[id number]    Assistive Device  Normally Used: No  Assistive Device During Testing: No     Balance:  1. Sitting Balance: 1/1  Leans or slides in chair =0; Steady, safe =1    2. Arises: 2/2  Unable without help =0; Able, uses arms =1; Able without using arms = 2    3. Attempts to arise: 2/2  Unable w/o help=0; Able, requires > 1 attempt =1; Able in 1 attempt =2    4. Immediate standing balance (first 5 seconds): 2/2  Unsteady (sway/stagger/feet move)=0; Steady, w/ support =1; Steady w/o support =2    5. Standing balance: 2/2  Unsteady =0; Steady, stance > 4 inch WIN & requires support =1; Narrow stance, w/o support =2    6. Sternal nudge (feet close together): 1/2  Begins to fall =0; Staggers, grabs, catches self =1; Steady =2    7. Eyes closed (feet close together): 1/1  Unsteady =0; Steady =1    8. Turning 360 degrees: 0/1 1/1  Discontinuous steps =0; Continuous steps =1   Unsteady (staggers, grabs) =0; Steady =1    9. Sitting Down: 2/2  Unsafe (misjudges distance, falls) =0;  "Uses arms, or not a smooth motion =1 ; Safe, smooth motion =2    Balance Score Total: 14/16    Gait:  Patient stands with therapist, walks across room (+/- aids), first at usual pace, then at rapid pace, but safe pace.   10. Gait initiation (immediate after told go"): 1/1  Any hesitancy, multiple attempts to start =0; No hesitancy =1    11. Step Length: 2/2  R swing foot passes L stance leg =1; L swing foot passes R =1    12. Foot Clearance: 2/2  R foot completely clears floor =1; L foot completely clears floor =1    13. Step Symmetry: 0/1  R and L step length unequal =0; R and L step length equal=1    14. Step Continuity: 1/1  Stop/discontinuity between steps =0; Steps appear continuous =1    15. Path (excursion): 1/2  Marked deviation =0; Mild/moderate deviation or use of aid =1; Straight without device=2    16. Trunk: 2/2  Marked sway or uses device =0; No sway but knee or trunk flexion or spread arms while walking =1; None of the above deviations=2    18. Base of Support: 1/1  Heels apart =0; Heels close while walking =1    Gait Score Total:  10/12      Total Score (Balance and Gait): 24/28  Fall Risk: Minimal >23, Moderate 19-23, High < 19        AM-PAC 6 CLICK MOBILITY  Total Score:22       Therapeutic Activities and Exercises:  ·  PT educated patient re:   · PT plan of care/role of PT  · Safety with OOB mobility  · Anatomy/physiology  · Discharge disposition - cardiac rehab if qualified vs   · Pt verbalized understanding       AM-PAC 6 CLICK MOBILITY  Total Score:22     Patient left up in chair with all lines intact, call button in reach, RN April notified and Dr. Mckeon present.    GOALS:   Multidisciplinary Problems     Physical Therapy Goals     Not on file          Multidisciplinary Problems (Resolved)        Problem: Physical Therapy Goal    Goal Priority Disciplines Outcome Goal Variances Interventions   Physical Therapy Goal   (Resolved)     PT, PT/OT Met                     History:     Past " Medical History:   Diagnosis Date    Anemia     Cataract     GERD (gastroesophageal reflux disease)     Hypertension     Macular degeneration     Migraine        Past Surgical History:   Procedure Laterality Date    APPENDECTOMY      BREAST BIOPSY Left 03/22/2019    EXCISIONAL BX    BREAST SURGERY      biopsy    BUNIONECTOMY      COSMETIC SURGERY      facelift    EYE SURGERY      bilateral cataracts       Time Tracking:     PT Received On: 06/21/20  PT Start Time: 1022     PT Stop Time: 1045  PT Total Time (min): 23 min     Billable Minutes: Evaluation 23      Cora Rick, PT  06/21/2020

## 2020-06-21 NOTE — PLAN OF CARE
Patient is AAOx 4.  Plan of Care reviewed with her during her assessment.  No issues/complaints voiced.  She denies pain.  No skin breakdown.  IV site flushed and saline locked.  Telemetry does show she is still in Atrial fibrillation but the rate is 80-90s with an occasional increase to 120s with activities(ambulation).  Bed alarm used for patient safety but she has called to let us know when she wants to get out of bed without fail thus far.  Rounding maintained per protocol.

## 2020-06-21 NOTE — ASSESSMENT & PLAN NOTE
No prior history of atrial fibrillation.  Failed to improve significantly despite two doses of intravenous metoprolol and two doses of intravenous diltiazem.  Started on continuous diltiazem infusion for rate control. [id number] Score of at least 4 points (age, female sex, hypertension).  Patient reports no history of bleeding disorder.  Started on full dose anticoagulation with subcutaneous enoxaparin and switched to oral apixaban.  Rate improved and diltiazem discontinued.  Patient transition to oral metoprolol and also started digoxin.  Rate more recently well controlled.  Echocardiogram revealed severe right atrial enlargement.

## 2020-06-22 VITALS
TEMPERATURE: 98 F | HEIGHT: 64 IN | HEART RATE: 119 BPM | SYSTOLIC BLOOD PRESSURE: 135 MMHG | OXYGEN SATURATION: 93 % | RESPIRATION RATE: 20 BRPM | BODY MASS INDEX: 21.98 KG/M2 | WEIGHT: 128.75 LBS | DIASTOLIC BLOOD PRESSURE: 88 MMHG

## 2020-06-22 LAB
ALBUMIN SERPL BCP-MCNC: 3.2 G/DL (ref 3.5–5.2)
ALP SERPL-CCNC: 100 U/L (ref 55–135)
ALT SERPL W/O P-5'-P-CCNC: 18 U/L (ref 10–44)
ANION GAP SERPL CALC-SCNC: 10 MMOL/L (ref 8–16)
AST SERPL-CCNC: 31 U/L (ref 10–40)
BASOPHILS # BLD AUTO: 0.04 K/UL (ref 0–0.2)
BASOPHILS NFR BLD: 0.6 % (ref 0–1.9)
BILIRUB SERPL-MCNC: 0.4 MG/DL (ref 0.1–1)
BNP SERPL-MCNC: 397 PG/ML (ref 0–99)
BUN SERPL-MCNC: 8 MG/DL (ref 8–23)
CALCIUM SERPL-MCNC: 9.6 MG/DL (ref 8.7–10.5)
CHLORIDE SERPL-SCNC: 98 MMOL/L (ref 95–110)
CO2 SERPL-SCNC: 27 MMOL/L (ref 23–29)
CREAT SERPL-MCNC: 0.7 MG/DL (ref 0.5–1.4)
DIFFERENTIAL METHOD: ABNORMAL
DIGOXIN SERPL-MCNC: 1 NG/ML (ref 0.8–2)
EOSINOPHIL # BLD AUTO: 0.2 K/UL (ref 0–0.5)
EOSINOPHIL NFR BLD: 3 % (ref 0–8)
ERYTHROCYTE [DISTWIDTH] IN BLOOD BY AUTOMATED COUNT: 12.4 % (ref 11.5–14.5)
EST. GFR  (AFRICAN AMERICAN): >60 ML/MIN/1.73 M^2
EST. GFR  (NON AFRICAN AMERICAN): >60 ML/MIN/1.73 M^2
GLUCOSE SERPL-MCNC: 97 MG/DL (ref 70–110)
HCT VFR BLD AUTO: 38.4 % (ref 37–48.5)
HGB BLD-MCNC: 12.4 G/DL (ref 12–16)
IMM GRANULOCYTES # BLD AUTO: 0.02 K/UL (ref 0–0.04)
IMM GRANULOCYTES NFR BLD AUTO: 0.3 % (ref 0–0.5)
LYMPHOCYTES # BLD AUTO: 2 K/UL (ref 1–4.8)
LYMPHOCYTES NFR BLD: 31.6 % (ref 18–48)
MAGNESIUM SERPL-MCNC: 1.7 MG/DL (ref 1.6–2.6)
MCH RBC QN AUTO: 31.2 PG (ref 27–31)
MCHC RBC AUTO-ENTMCNC: 32.3 G/DL (ref 32–36)
MCV RBC AUTO: 97 FL (ref 82–98)
MONOCYTES # BLD AUTO: 0.9 K/UL (ref 0.3–1)
MONOCYTES NFR BLD: 14.8 % (ref 4–15)
NEUTROPHILS # BLD AUTO: 3.1 K/UL (ref 1.8–7.7)
NEUTROPHILS NFR BLD: 49.7 % (ref 38–73)
NRBC BLD-RTO: 0 /100 WBC
PHOSPHATE SERPL-MCNC: 3.1 MG/DL (ref 2.7–4.5)
PLATELET # BLD AUTO: 288 K/UL (ref 150–350)
PMV BLD AUTO: 9.3 FL (ref 9.2–12.9)
POTASSIUM SERPL-SCNC: 3.8 MMOL/L (ref 3.5–5.1)
PROT SERPL-MCNC: 7.2 G/DL (ref 6–8.4)
RBC # BLD AUTO: 3.97 M/UL (ref 4–5.4)
SODIUM SERPL-SCNC: 135 MMOL/L (ref 136–145)
WBC # BLD AUTO: 6.23 K/UL (ref 3.9–12.7)

## 2020-06-22 PROCEDURE — 83735 ASSAY OF MAGNESIUM: CPT

## 2020-06-22 PROCEDURE — 80053 COMPREHEN METABOLIC PANEL: CPT

## 2020-06-22 PROCEDURE — 99239 HOSP IP/OBS DSCHRG MGMT >30: CPT | Mod: ,,, | Performed by: HOSPITALIST

## 2020-06-22 PROCEDURE — 25000003 PHARM REV CODE 250: Performed by: INTERNAL MEDICINE

## 2020-06-22 PROCEDURE — 94618 PULMONARY STRESS TESTING: CPT

## 2020-06-22 PROCEDURE — 36415 COLL VENOUS BLD VENIPUNCTURE: CPT

## 2020-06-22 PROCEDURE — 85025 COMPLETE CBC W/AUTO DIFF WBC: CPT

## 2020-06-22 PROCEDURE — 80162 ASSAY OF DIGOXIN TOTAL: CPT

## 2020-06-22 PROCEDURE — 99233 PR SUBSEQUENT HOSPITAL CARE,LEVL III: ICD-10-PCS | Mod: ,,, | Performed by: INTERNAL MEDICINE

## 2020-06-22 PROCEDURE — 83880 ASSAY OF NATRIURETIC PEPTIDE: CPT

## 2020-06-22 PROCEDURE — 99233 SBSQ HOSP IP/OBS HIGH 50: CPT | Mod: ,,, | Performed by: INTERNAL MEDICINE

## 2020-06-22 PROCEDURE — 84100 ASSAY OF PHOSPHORUS: CPT

## 2020-06-22 PROCEDURE — 94761 N-INVAS EAR/PLS OXIMETRY MLT: CPT

## 2020-06-22 PROCEDURE — 99239 PR HOSPITAL DISCHARGE DAY,>30 MIN: ICD-10-PCS | Mod: ,,, | Performed by: HOSPITALIST

## 2020-06-22 PROCEDURE — 25000003 PHARM REV CODE 250: Performed by: HOSPITALIST

## 2020-06-22 PROCEDURE — 25000003 PHARM REV CODE 250: Performed by: SURGERY

## 2020-06-22 RX ORDER — METOPROLOL TARTRATE 100 MG/1
100 TABLET ORAL 2 TIMES DAILY
Qty: 60 TABLET | Refills: 0 | Status: SHIPPED | OUTPATIENT
Start: 2020-06-22 | End: 2020-07-14 | Stop reason: SDUPTHER

## 2020-06-22 RX ORDER — POLYETHYLENE GLYCOL 3350 17 G/17G
17 POWDER, FOR SOLUTION ORAL DAILY
Qty: 238 G | Refills: 0 | Status: SHIPPED | OUTPATIENT
Start: 2020-06-23 | End: 2020-07-23

## 2020-06-22 RX ORDER — FUROSEMIDE 20 MG/1
20 TABLET ORAL DAILY
Qty: 30 TABLET | Refills: 0 | Status: SHIPPED | OUTPATIENT
Start: 2020-06-23 | End: 2020-07-14 | Stop reason: SDUPTHER

## 2020-06-22 RX ORDER — LISINOPRIL 40 MG/1
40 TABLET ORAL DAILY
Qty: 30 TABLET | Refills: 0
Start: 2020-06-22 | End: 2020-07-14 | Stop reason: SDUPTHER

## 2020-06-22 RX ORDER — ACETAMINOPHEN 500 MG
1000 TABLET ORAL EVERY 8 HOURS PRN
Refills: 0 | COMMUNITY
Start: 2020-06-22

## 2020-06-22 RX ORDER — DIGOXIN 250 MCG
0.25 TABLET ORAL DAILY
Qty: 30 TABLET | Refills: 0 | Status: SHIPPED | OUTPATIENT
Start: 2020-06-23 | End: 2020-07-14 | Stop reason: SDUPTHER

## 2020-06-22 RX ORDER — SPIRONOLACTONE 25 MG/1
25 TABLET ORAL DAILY
Qty: 30 TABLET | Refills: 0 | Status: SHIPPED | OUTPATIENT
Start: 2020-06-23 | End: 2020-07-14 | Stop reason: SDUPTHER

## 2020-06-22 RX ADMIN — DIGOXIN 0.25 MG: 125 TABLET ORAL at 08:06

## 2020-06-22 RX ADMIN — METOPROLOL TARTRATE 100 MG: 50 TABLET, FILM COATED ORAL at 08:06

## 2020-06-22 RX ADMIN — TRAMADOL HYDROCHLORIDE 50 MG: 50 TABLET, FILM COATED ORAL at 01:06

## 2020-06-22 RX ADMIN — POLYETHYLENE GLYCOL 3350 17 G: 17 POWDER, FOR SOLUTION ORAL at 08:06

## 2020-06-22 RX ADMIN — SPIRONOLACTONE 25 MG: 25 TABLET ORAL at 08:06

## 2020-06-22 RX ADMIN — CITALOPRAM HYDROBROMIDE 20 MG: 20 TABLET ORAL at 08:06

## 2020-06-22 RX ADMIN — LISINOPRIL 40 MG: 20 TABLET ORAL at 08:06

## 2020-06-22 RX ADMIN — APIXABAN 2.5 MG: 2.5 TABLET, FILM COATED ORAL at 08:06

## 2020-06-22 RX ADMIN — FUROSEMIDE 20 MG: 20 TABLET ORAL at 08:06

## 2020-06-22 NOTE — PROGRESS NOTES
[facility]  Cardiology  Progress Note    Patient Name: Ellen Neves  MRN: [id number]  Admission Date: 6/18/2020  Hospital Length of Stay: 4 days  Code Status: Full Code   Attending Physician: Juan Jimenez MD   Primary Care Physician: Abran Terrazas MD  Expected Discharge Date:   Principal Problem:Atrial fibrillation with rapid ventricular response    Subjective:     Brief HPI:    Ellen Neves is a 80 y.o.female with hypertension. On about 5/20/2020 she had nausea and vomited. She felt weak. At that time she also noted that her heart rate had become elevated. She mostly recorded ar heart rate 100-120 bpm. She continued to record an elevated heart rate over the coming weeks. On about 6/15/2020 she felt increasingly weak and began being short of breath. She noted her abdomen had become mildly distended. She saw Dr. Abran Terrazas on 6/18/2020 and was referred to the ER at [facility] for admission. She was in atrial fibrillation with a VRR of about 150 bpm on presentation. She denies any chest pain.      Hospital Course:    Diltiazem iv and metoprolol po.    Apixiban.    Diuresis.    6/19/2020: Echo: Normal left ventricular size with low normal systolic function. EF 50%. Severely dilated LA. Severely dilated RA. Moderate aortic valve sclerosis. Mild to moderate AR. Moderate MR. Moderate TR. SPAP 56 mmHg.    6/19/2020: Agitation and confusion.    6/19/2020: Digoxin was added.    Interval History:    VRR mostly  bpm.    Breathing well supine.    Review of Systems   Constitution: Negative for chills, fever and malaise/fatigue.   HENT: Negative for nosebleeds.    Eyes: Negative for vision loss in left eye and vision loss in right eye.   Cardiovascular: Negative for chest pain, leg swelling, orthopnea, palpitations and paroxysmal nocturnal dyspnea.   Respiratory: Negative for cough, hemoptysis, shortness of breath, sputum production and wheezing.    Hematologic/Lymphatic: Negative for bleeding  problem.   Skin: Negative for rash.   Musculoskeletal: Negative for myalgias.   Gastrointestinal: Negative for abdominal pain, heartburn, hematemesis, hematochezia, jaundice, melena, nausea and vomiting.   Genitourinary: Negative for hematuria.   Neurological: Negative for dizziness, headaches, light-headedness, vertigo and weakness.   Psychiatric/Behavioral: Negative for altered mental status. The patient is not nervous/anxious.    Allergic/Immunologic: Negative for persistent infections.       Objective:     Vital Signs (Most Recent):  Temp: 97.6 °F (36.4 °C) (06/22/20 0423)  Pulse: 104 (06/22/20 0600)  Resp: 16 (06/22/20 0423)  BP: 136/74 (06/22/20 0423)  SpO2: 96 % (06/22/20 0423) Vital Signs (24h Range):  Temp:  [97.5 °F (36.4 °C)-98.4 °F (36.9 °C)] 97.6 °F (36.4 °C)  Pulse:  [] 104  Resp:  [16-20] 16  SpO2:  [92 %-96 %] 96 %  BP: (135-167)/() 136/74     Weight: 58.4 kg (128 lb 12 oz)  Body mass index is 22.1 kg/m².    SpO2: 96 %  O2 Device (Oxygen Therapy): nasal cannula      Intake/Output Summary (Last 24 hours) at 6/22/2020 0730  Last data filed at 6/22/2020 0200  Gross per 24 hour   Intake 240 ml   Output 1200 ml   Net -960 ml       Lines/Drains/Airways     Peripheral Intravenous Line                 Peripheral IV - Single Lumen 06/20/20 0115 Anterior;Right Forearm 2 days                Physical Exam   Constitutional: She appears well-developed and well-nourished. She is sleeping. She appears ill. No distress.   Eyes: Right conjunctiva is not injected. Right conjunctiva has no hemorrhage. Left conjunctiva is not injected. Left conjunctiva has no hemorrhage.   Neck: No JVD present.   Cardiovascular: S1 normal and S2 normal. An irregularly irregular rhythm present. Tachycardia present. Exam reveals no gallop.   Murmur heard.  High-pitched blowing holosystolic murmur is present with a grade of 2/6 at the apex.  Pulmonary/Chest: Effort normal. She has decreased breath sounds in the right lower  field.   Abdominal: Normal appearance. There is no abdominal tenderness.   Musculoskeletal:      Right ankle: She exhibits no swelling.      Left ankle: She exhibits no swelling.   Skin: Skin is warm and dry. No rash noted.   Psychiatric: She has a normal mood and affect. Her speech is normal. Cognition and memory are normal.       Current Medications:     apixaban  5 mg Oral BID    citalopram  20 mg Oral Daily    digoxin  0.25 mg Oral Daily    furosemide  20 mg Oral Daily    lisinopriL  40 mg Oral Daily    metoprolol tartrate  100 mg Oral BID    polyethylene glycol  17 g Oral Daily    spironolactone  25 mg Oral Daily    temazepam  7.5 mg Oral QHS    vit C,V-Xh-uuxbf-lutein-zeaxan  1 each Oral BID     Current Laboratory Results:    Recent Results (from the past 24 hour(s))   Magnesium    Collection Time: 06/22/20  4:42 AM   Result Value Ref Range    Magnesium 1.7 1.6 - 2.6 mg/dL   Phosphorus    Collection Time: 06/22/20  4:42 AM   Result Value Ref Range    Phosphorus 3.1 2.7 - 4.5 mg/dL   Comprehensive metabolic panel    Collection Time: 06/22/20  4:42 AM   Result Value Ref Range    Sodium 135 (L) 136 - 145 mmol/L    Potassium 3.8 3.5 - 5.1 mmol/L    Chloride 98 95 - 110 mmol/L    CO2 27 23 - 29 mmol/L    Glucose 97 70 - 110 mg/dL    BUN, Bld 8 8 - 23 mg/dL    Creatinine 0.7 0.5 - 1.4 mg/dL    Calcium 9.6 8.7 - 10.5 mg/dL    Total Protein 7.2 6.0 - 8.4 g/dL    Albumin 3.2 (L) 3.5 - 5.2 g/dL    Total Bilirubin 0.4 0.1 - 1.0 mg/dL    Alkaline Phosphatase 100 55 - 135 U/L    AST 31 10 - 40 U/L    ALT 18 10 - 44 U/L    Anion Gap 10 8 - 16 mmol/L    eGFR if African American >60 >60 mL/min/1.73 m^2    eGFR if non African American >60 >60 mL/min/1.73 m^2   Digoxin level    Collection Time: 06/22/20  4:42 AM   Result Value Ref Range    Digoxin Lvl 1.0 0.8 - 2.0 ng/mL   CBC auto differential    Collection Time: 06/22/20  4:43 AM   Result Value Ref Range    WBC 6.23 3.90 - 12.70 K/uL    RBC 3.97 (L) 4.00 - 5.40 M/uL     Hemoglobin 12.4 12.0 - 16.0 g/dL    Hematocrit 38.4 37.0 - 48.5 %    Mean Corpuscular Volume 97 82 - 98 fL    Mean Corpuscular Hemoglobin 31.2 (H) 27.0 - 31.0 pg    Mean Corpuscular Hemoglobin Conc 32.3 32.0 - 36.0 g/dL    RDW 12.4 11.5 - 14.5 %    Platelets 288 150 - 350 K/uL    MPV 9.3 9.2 - 12.9 fL    Immature Granulocytes 0.3 0.0 - 0.5 %    Gran # (ANC) 3.1 1.8 - 7.7 K/uL    Immature Grans (Abs) 0.02 0.00 - 0.04 K/uL    Lymph # 2.0 1.0 - 4.8 K/uL    Mono # 0.9 0.3 - 1.0 K/uL    Eos # 0.2 0.0 - 0.5 K/uL    Baso # 0.04 0.00 - 0.20 K/uL    nRBC 0 0 /100 WBC    Gran% 49.7 38.0 - 73.0 %    Lymph% 31.6 18.0 - 48.0 %    Mono% 14.8 4.0 - 15.0 %    Eosinophil% 3.0 0.0 - 8.0 %    Basophil% 0.6 0.0 - 1.9 %    Differential Method Automated    Brain Natriuretic Peptide    Collection Time: 06/22/20  4:43 AM   Result Value Ref Range     (H) 0 - 99 pg/mL     Current Imaging Results:    X-Ray Chest AP Portable   Final Result      Abnormal findings within the right lower thorax consistent with effusion/infiltrate along with cardiomegaly.         Electronically signed by: Sulaiman Gomez MD   Date:    06/18/2020   Time:    15:09      X-Ray Abdomen Flat And Erect   Final Result      Findings within the included right lower thorax suggestive of effusion and postinflammatory changes as well as cardiomegaly.  Incidental osseous findings with otherwise flat and erect views of the abdomen.         Electronically signed by: Sulaiman Gomez MD   Date:    06/18/2020   Time:    15:08      X-Ray Chest PA And Lateral    (Results Pending)       6/19/2020: Echo:    Low normal left ventricular systolic function. The estimated ejection fraction is 50%.  Concentric left ventricular remodeling.  Diastolic pattern consistent with atrial fibrillation observed.  Severe left atrial enlargement.  Normal right ventricular systolic function.  Severe right atrial enlargement.  Moderate aortic valve sclerosis.  Mild-to-moderate aortic  regurgitation.  Moderate mitral regurgitation.  Moderate tricuspid regurgitation.  Moderate pulmonary hypertension present.  The estimated PA systolic pressure is 56 mmHg.  Intermediate central venous pressure (8 mmHg).      Assessment and Plan:     Problem List:    Active Diagnoses:    Diagnosis Date Noted POA    PRINCIPAL PROBLEM:  Atrial fibrillation with rapid ventricular response [I48.91] 06/18/2020 Yes    Debility [R53.81] 06/21/2020 Yes    Pleural effusion on right [J90] 06/18/2020 Yes    Macular degeneration [H35.30] 04/01/2014 Yes    Essential hypertension [I10] 07/16/2012 Yes      Problems Resolved During this Admission:    Diagnosis Date Noted Date Resolved POA    Abdominal distention [R14.0] 06/18/2020 06/21/2020 Yes     Assessment and Plan:     1. Atrial Fibrillation              5/20/2020: Clinical onset of persistent atrial fibrillation.              6/18/2020: Presents with fast VRR. Received diltiazem iv.               [id number] (CHA2Sc).   6/19/2020: Began digoxin.              On metoprolol 100 mg Q12 and digoxin 0.125 mg Q24.              Anticoagulation with apixiban.   6/21/2020: VRR on high side.   6/21/2020: Could possibly consider RAYSA guided CV in the future but likelihood on long term success with rhythm control strategy would be low. Long discussion with patient about options. 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 We decided on a rate control strategy at least for now which I agree is very reasonable.   6/22/2020: Digoxin 1.0.   On metoprolol 100 mg Q12 and digoxin 0.25 mg Q24.              Appears rate reasonably well controlled.                  2. Chronic Anticoagulation              [id number] (CHA2Sc).              Anticoagulation with apixiban.              Apixiban 2.5 mg Q12.   No aspirin or NSAID.     3. Heart Failure, Diastolic, Acute   6/19/2020: Echo: Normal left ventricular size  with low normal systolic function. EF 50%. Severely dilated LA. Severely dilated RA. Moderate aortic valve sclerosis. Mild to moderate AR. Moderate MR. Moderate TR. SPAP 56 mmHg.    6/18/2020: Presented in HF. Received furosemide 20 mg iv Q12.   6/22/2020: .   On lisinopril 40 mg Q24, furosemide 20 mg Q24 and spironolactone 25 mg Q24.   6/22/2020: Plan is CXR.    4. Mitral Regurgitation   6/19/2020: Echo: Moderate MR.    5. Pulmonary Hypertension   6/19/2020: Echo: SPAP 56 mmHg.               6. Hypertension              At home been on nadolol 40 mg Q12 and lisinopril 40 mg Q24.   On metoprolol 100 mg Q12, lisinopril 40 mg Q24, furosemide 20 mg Q24 and spironolactone 25 mg Q24.    7. Hyponatremia   6/20/2020: Na 128.   Fluid restriction.   6/22/2020: Na 135.   Improving.      8. Migraine               Occasional tramadol.    9. Confusion   6/19/2020: Agitation and confusion.   Resolved.    10. Primary Care              Dr. Abran Terrazas.    11. Disposition   Discharge home okay from cardiac standpoint.   Follow up.      VTE Risk Mitigation (From admission, onward)         Ordered     apixaban tablet 5 mg  2 times daily      06/18/20 2032     IP VTE HIGH RISK PATIENT  Once      06/18/20 1704     Place sequential compression device  Until discontinued      06/18/20 1704                Donnell Mckeon MD  Cardiology  [facility]

## 2020-06-22 NOTE — PLAN OF CARE
Referral sent to pt's choice for [facility]. Pending acceptance from [facility]   06/22/20 1027   Post-Acute Status   Post-Acute Authorization Home Health   Home Health Status Pending Payor Medical Review

## 2020-06-22 NOTE — PROGRESS NOTES
All discharge instructions reviewed with patient and daughter. All questions answered and verbalized understanding. Peripheral IV's and telemetry box removed per order prior to discharge, patient tolerated well. Patient transported off unit via , to be driven home by medicaid van. Safety precautions maintained. Surgical mask in place per protocol.

## 2020-06-22 NOTE — PLAN OF CARE
Patient is AAO x 4.  Plan of care reviewed during her shift assessment.  No issues voiced at that time.  Telemetry has remained Atrial Fibrillation but no RVR observed. Patient denies shortness of breath and her oxygen has been weaned down to 1 liter/nasal cannula with 94-98% saturation.  Headache and neck pain voiced during the night with both Tylenol po and Tramadol 50mg po given at separate times.  No skin breakdown observed.  Patient is ambulatory with standby assist.  Rounding maintained per protocol.

## 2020-06-22 NOTE — PT/OT/SLP PROGRESS
Occupational Therapy  Not seen Note    Patient Name:  Ellen Neves   MRN:  [id number]    OT evaluation attempted.  Patient not seen today secondary to Unavailable (Nursing care and X-ray). A second attempt was made at 10:36 this morning, when she reported going home today and declined the assessment.  She viewed it as unnecessary.  No follow-up planned; D/C OT..    Carole Candelario, [place], LOTR  6/22/2020

## 2020-06-22 NOTE — PLAN OF CARE
Pt discharging home today, daughter to provide transportation home.    Home Health arrangements with pt's choice, [facility].Pt accepted for tomorrow.    Information entered into AVS in Follow-up.    Post acute completed.    No additional CM needs or barriers for discharge.   06/22/20 1113   Final Note   Assessment Type Final Discharge Note   Anticipated Discharge Disposition Home-Health   What phone number can be called within the next 1-3 days to see how you are doing after discharge? [phone]   Hospital Follow Up  Appt(s) scheduled? Yes   Discharge plans and expectations educations in teach back method with documentation complete? Yes   Right Care Referral Info   Post Acute Recommendation Home-care

## 2020-06-22 NOTE — PLAN OF CARE
[facility]    HOME HEALTH ORDERS  FACE TO FACE ENCOUNTER    Patient Name: Ellen Neves  YOB: 1940    PCP: Abran Terrazas MD   PCP Address: [address] / [place] [address]  PCP Phone Number: [phone]  PCP Fax: [phone]    Encounter Date: 06/22/2020    Admit to Home Health    Diagnoses:  Active Hospital Problems    Diagnosis  POA    *Atrial fibrillation with rapid ventricular response [I48.91]  Yes     Priority: 1 - High    Pleural effusion on right [J90]  Yes     Priority: 5     Essential hypertension [I10]  Yes     Priority: 10     Debility [R53.81]  Yes    Macular degeneration [H35.30]  Yes      Resolved Hospital Problems    Diagnosis Date Resolved POA    Abdominal distention [R14.0] 06/21/2020 Yes     Priority: 6        No future appointments.  Follow-up Information     Donnell Mckeon MD. Schedule an appointment as soon as possible for a visit in 2 weeks.    Specialty: Cardiology  Why: Management of atrial fibrillation with rapid ventriciular resposne  Contact information:  [address]  [address]  [facility] [id number] [phone]             Schedule an appointment as soon as possible for a visit with Abran Terrazas MD.    Specialty: Internal Medicine  Why: Management of medical co-morbidites  Contact information:  [address] [facility] [address]  [facility] [id number] [phone]                     I have seen and examined this patient face to face today. My clinical findings that support the need for the home health skilled services and home bound status are the following:  Weakness/numbness causing balance and gait disturbance due to Weakness/Debility making it taxing to leave home.    Allergies:  Review of patient's allergies indicates:   Allergen Reactions    No known drug allergies        Diet: cardiac diet and fluid restriction: 1200    Activities: activity as tolerated    Nursing:   SN to complete comprehensive assessment  including routine vital signs. Instruct on disease process and s/s of complications to report to MD. Review/verify medication list sent home with the patient at time of discharge  and instruct patient/caregiver as needed. Frequency may be adjusted depending on start of care date.    Notify MD if SBP > 160 or < 90; DBP > 90 or < 50; HR > 120 or < 50; Temp > 101      CONSULTS:    Physical Therapy to evaluate and treat. Evaluate for home safety and equipment needs; Establish/upgrade home exercise program. Perform / instruct on therapeutic exercises, gait training, transfer training, and Range of Motion.  Occupational Therapy to evaluate and treat. Evaluate home environment for safety and equipment needs. Perform/Instruct on transfers, ADL training, ROM, and therapeutic exercises.   to evaluate for community resources/long-range planning.  Aide to provide assistance with personal care, ADLs, and vital signs.    Medications: Review discharge medications with patient and family and provide education.      Current Discharge Medication List      START taking these medications    Details   acetaminophen (TYLENOL) 500 MG tablet Take 2 tablets (1,000 mg total) by mouth every 8 (eight) hours as needed for Pain.  Refills: 0      apixaban (ELIQUIS) 2.5 mg Tab Take 1 tablet (2.5 mg total) by mouth 2 (two) times daily.  Qty: 60 tablet, Refills: 0      digoxin (LANOXIN) 250 mcg tablet Take 1 tablet (0.25 mg total) by mouth once daily.  Qty: 30 tablet, Refills: 0      furosemide (LASIX) 20 MG tablet Take 1 tablet (20 mg total) by mouth once daily.  Qty: 30 tablet, Refills: 0      metoprolol tartrate (LOPRESSOR) 100 MG tablet Take 1 tablet (100 mg total) by mouth 2 (two) times daily.  Qty: 60 tablet, Refills: 0    Comments: .      polyethylene glycol (GLYCOLAX) 17 gram PwPk Take 17 g by mouth once daily.  Qty: 30 each, Refills: 0      spironolactone (ALDACTONE) 25 MG tablet Take 1 tablet (25 mg total) by mouth once  daily.  Qty: 30 tablet, Refills: 0    Comments: .      vit C,U-Tj-jaqgx-lutein-zeaxan (PRESERVISION AREDS 2) 404-919-61-1 mg-unit-mg-mg Cap Take 1 each by mouth 2 (two) times a day.  Qty:           CONTINUE these medications which have CHANGED    Details   lisinopriL (PRINIVIL,ZESTRIL) 40 MG tablet Take 1 tablet (40 mg total) by mouth once daily.  Qty: 30 tablet, Refills: 0      temazepam (RESTORIL) 7.5 MG Cap Take 1 capsule (7.5 mg total) by mouth every evening. for 7 days  Qty: 7 capsule, Refills: 0         CONTINUE these medications which have NOT CHANGED    Details   citalopram (CELEXA) 20 MG tablet TAKE 1 TABLET DAILY  Qty: 90 tablet, Refills: 3      traMADoL (ULTRAM) 50 mg tablet TAKE 1 TABLET BY MOUTH EVERY 6-8 HOURS AS NEEDED FOR PAIN More than 7 day quantity medically necessary  Qty: 100 tablet, Refills: 0    Comments: Not to exceed 4 additional fills before 10/30/2016         STOP taking these medications       amLODIPine (NORVASC) 5 MG tablet Comments:   Reason for Stopping:         clobetasol 0.05% (TEMOVATE) 0.05 % Oint Comments:   Reason for Stopping:         mupirocin (BACTROBAN) 2 % ointment Comments:   Reason for Stopping:         nadoloL (CORGARD) 40 MG tablet Comments:   Reason for Stopping:         zolpidem (AMBIEN) 10 mg Tab Comments:   Reason for Stopping:         hydroCHLOROthiazide (HYDRODIURIL) 25 MG tablet Comments:   Reason for Stopping:               I certify that this patient is confined to her home and needs intermittent skilled nursing care, physical therapy and occupational therapy.

## 2020-06-22 NOTE — PLAN OF CARE
POC reviewed with patient and daughter at bedside. PRN Tramadol given twice this shift for headache with moderate relief noted. VSS on 2L oxygen via NC. Telemetry monitor in place, Afib noted with HR between 100-130 this shift. No SOB or chest pain noted. Up to toilet frequently with standby assist. Tolerated meds and meals well. 800cc fluid restriction in place. All safety precautions in place, call bell in reach. Hourly rounding completed.

## 2020-06-22 NOTE — DISCHARGE SUMMARY
[facility] Hospital Medicine  Discharge Summary      Patient Name: Ellen Neves  MRN: [id number]  Admission Date: 6/18/2020  Hospital Length of Stay: 4 days  Discharge Date and Time: 6/22/2020 11:55 AM  Attending Physician: Juan Jimenez MD  Discharging Provider: Juan Jimenez MD  Primary Care Provider: Abran Terrazas MD      HPI:   Patient is 80-year-old woman with longstanding history of hypertension who was sent to the emergency department by her primary care provider Dr. Abran Terrazas due to dyspnea with new onset of atrial fibrillation rapid ventricular response.  Patient reports that she has had progressive shortness of breath for the last 2 days but notice change initially about 2 weeks ago when she had some shortness of breath at that time.  She denies any chest pain.  She denies any fevers or chills.  She reports noticing progressively worsening abdominal distention but denies any abdominal pain, nausea, or vomiting.  She reports she has chronic constipation has not had a bowel movement since Monday.  She reports smoking tobacco in college but otherwise been tobacco free.  She does drink about 3 drinks of wine per day.  Her family reports that this is a conservative estimate.  She denies any history of alcohol withdrawal.    Hospital Course:   Patient is 80-year-old woman with hypertension admitted to the intensive care unit for treatment of new onset of atrial fibrillation with rapid ventricular response and for evaluation of a right pleural effusion likely due to heart failure.  Patient started on a continuous diltiazem infusion to control her rapid response.  Patient also started on anticoagulation due to high risk of stroke with a [id number] Score of at least 4 points (age, female sex, hypertension).  She was initially treated with subcutaneous enoxaparin which was subsequently transition to oral apixaban.  Patient's heart rate improved and she was weaned off continuous  diltiazem infusion and transitioned to oral metoprolol.  Despite this her pulse remained elevated and required additional doses of AV bernadette blockade and therefore patient was also loaded with digoxin.  Her ventricular response rate improved.  She had an episode of confusion likely related to use of zolpidem.  Confusion resolved.      Echocardiogram showed evidence of modest reduction in systolic function but with severely dilated left atrium and severely dilated right atrium with mild to moderate diffuse valvular disease.  Repeat chest radiograph shows improvement in her right pleural effusion.  I suspect her pleural effusions most likely a transudate related to heart failure probably due in part to her tachyarrhythmia.  However the effusion persists or worsens despite adequate diuresis and medical therapy to treat her heart failure I would advise patient undergo a thoracentesis.  She was weaned off supplemental oxygen and did not qualify for supplemental oxygen with ambulation.  Patient otherwise clinically stable this point to be discharged home with home health for additional therapy with close outpatient follow-up with Dr. Donnell Mckeon (Cardiology) and her primary care provider Dr. Abran Terrazas.     Consults: Cardiology      Final Active Diagnoses:    Diagnosis Date Noted POA    PRINCIPAL PROBLEM:  Atrial fibrillation with rapid ventricular response [I48.91] 06/18/2020 Yes    Pleural effusion on right [J90] 06/18/2020 Yes    Essential hypertension [I10] 07/16/2012 Yes    Debility [R53.81] 06/21/2020 Yes    Macular degeneration [H35.30] 04/01/2014 Yes      Problems Resolved During this Admission:    Diagnosis Date Noted Date Resolved POA    Abdominal distention [R14.0] 06/18/2020 06/21/2020 Yes       Discharged Condition: Stable    [place] [facility] [place]  Follow-up Information     Donnell Mckeon MD. Schedule an appointment as soon as possible for a visit in 2 weeks.     Specialty: Cardiology  Why: Management of atrial fibrillation with rapid ventriciular resposne  Contact information:  [address]  [address]  [facility] [address]  [phone]             Schedule an appointment as soon as possible for a visit with Abran Terrazas MD.    Specialty: Internal Medicine  Why: Management of medical co-morbidites  Contact information:  [address]  [facility] [address]  [phone]             [facility] [place].    Specialty: Home Health Services  Why: Home Health  Contact information:  [address]  [place] [address]  [phone]                 Patient Instructions:      Diet Cardiac     Notify your health care provider if you experience any of the following:  temperature >100.4     Notify your health care provider if you experience any of the following:  persistent nausea and vomiting or diarrhea     Notify your health care provider if you experience any of the following:  severe uncontrolled pain     Notify your health care provider if you experience any of the following:  difficulty breathing or increased cough     Notify your health care provider if you experience any of the following:  severe persistent headache     Notify your health care provider if you experience any of the following:  worsening rash     Notify your health care provider if you experience any of the following:  persistent dizziness, light-headedness, or visual disturbances     Notify your health care provider if you experience any of the following:  increased confusion or weakness     Activity as tolerated        Medications:  Reconciled Home Medications:      Medication List      START taking these medications    acetaminophen 500 MG tablet  Commonly known as: TYLENOL  Take 2 tablets (1,000 mg total) by mouth every 8 (eight) hours as needed for Pain.     digoxin 250 mcg tablet  Commonly known as: LANOXIN  Take 1 tablet (0.25 mg total) by mouth once daily.  Start taking  on: June 23, 2020     ELIQUIS 2.5 mg Tab  Generic drug: apixaban  Take 1 tablet (2.5 mg total) by mouth 2 (two) times daily.     furosemide 20 MG tablet  Commonly known as: LASIX  Take 1 tablet (20 mg total) by mouth once daily.  Start taking on: June 23, 2020     metoprolol tartrate 100 MG tablet  Commonly known as: LOPRESSOR  Take 1 tablet (100 mg total) by mouth 2 (two) times daily.     polyethylene glycol 17 gram/dose powder  Commonly known as: GLYCOLAX  mix 17 gm and  take by mouth once daily.  Start taking on: June 23, 2020     spironolactone 25 MG tablet  Commonly known as: ALDACTONE  Take 1 tablet (25 mg total) by mouth once daily.  Start taking on: June 23, 2020     vit C,C-Iv-bgdaw-lutein-zeaxan 535-128-61-1 mg-unit-mg-mg Cap  Commonly known as: PRESERVISION AREDS 2  Take 1 each by mouth 2 (two) times a day.        CHANGE how you take these medications    temazepam 7.5 MG Cap  Commonly known as: RESTORIL  Take 1 capsule (7.5 mg total) by mouth every evening. for 7 days  What changed:   · medication strength  · how much to take  · when to take this  · reasons to take this        CONTINUE taking these medications    citalopram 20 MG tablet  Commonly known as: CELEXA  TAKE 1 TABLET DAILY     lisinopriL 40 MG tablet  Commonly known as: PRINIVIL,ZESTRIL  Take 1 tablet (40 mg total) by mouth once daily.     traMADoL 50 mg tablet  Commonly known as: ULTRAM  TAKE 1 TABLET BY MOUTH EVERY 6-8 HOURS AS NEEDED FOR PAIN More than 7 day quantity medically necessary        STOP taking these medications    amLODIPine 5 MG tablet  Commonly known as: NORVASC     clobetasol 0.05% 0.05 % Oint  Commonly known as: TEMOVATE     hydroCHLOROthiazide 25 MG tablet  Commonly known as: HYDRODIURIL     mupirocin 2 % ointment  Commonly known as: BACTROBAN     nadoloL 40 MG tablet  Commonly known as: CORGARD     zolpidem 10 mg Tab  Commonly known as: AMBIEN            Indwelling Lines/Drains at time of discharge:   Lines/Drains/Airways      None                 Time spent on the discharge of patient: 35 minutes  Patient was seen and examined on the date of discharge and determined to be suitable for discharge.         Juan Jimenez MD  Department of Hospital Medicine  [facility]

## 2020-06-22 NOTE — PROCEDURES
"Instructions for measuring Oxygen Saturation  to qualify for Home Oxygen:        "Pulse Oximetry:   96%SpO2 on room air at rest on 6/22/20                           96%SpO2  on room air with activity/exercise on 06/22/20                        (NOTE:  FOR OXYGEN WITH ACTIVITY - MEDICARE WANTS TO SEE THAT THE OXYGEN INCREASES ONCE A PATIENT HAS WALKED AND IS BACK ON THE OXYGEN)        "

## 2020-06-23 ENCOUNTER — PATIENT OUTREACH (OUTPATIENT)
Dept: ADMINISTRATIVE | Facility: CLINIC | Age: 80
End: 2020-06-23

## 2020-06-23 NOTE — PATIENT INSTRUCTIONS
C3 nurse attempted to contact patient. No answer. The following message was left for the patient to return the call:  Good Morning, I am a nurse calling on behalf of [facility] from the [facility].  This is a Transitional Care Call for Ellen Neves. When you have a moment please contact us at [phone] or [phone] Monday through Friday, between the hours of 8 am to 4 pm. We look forward to speaking with you. On behalf of [facility] have a nice day.    The patient does not have a scheduled HOSFU appointment within 7 days post hospital discharge date 06/22. Message sent to Physician staff to assist with HOSFU appointment scheduling.        Discharge Instructions for Atrial Fibrillation  You have been diagnosed with an abnormal heart rhythm called atrial fibrillation. With this condition, your hearts 2 upper chambers quiver rather than squeeze the blood out in a normal pattern. This leads to an irregular and sometimes rapid heartbeat. Some people will develop associated symptoms such as a flip-flopping heartbeat, chest pain, lightheadedness, or shortness of breath. Other people may have no symptoms at all. Atrial fibrillation is serious because it affects the hearts ability to fill with blood as it should. Blood clots may form. This increases the risk for stroke. Untreated atrial fibrillation can also lead to heart failure. Atrial fibrillation can be controlled. With treatment, most people with atrial fibrillation lead normal lives.  Treatment options  Recommended treatment for atrial fibrillation depends on your age, symptoms, how long you have had atrial fibrillation, and other factors. You will have a complete evaluation to find out if you have any abnormalities that caused your heart to go into atrial fibrillation. This might be blocked heart arteries or a thyroid problem. Your doctor will assess your particular case and discuss choices with you.  Treatment  choices may include:  · Treating an underlying disorder that puts you at risk for atrial fibrillation. For example, correcting an abnormal thyroid or electrolyte problem, or treating a blocked heart artery.  · Restoring a normal heart rhythm with an electrical shock (cardioversion) or with an antiarrhythmic medicine (chemical cardioversion)  · Using medicine to control your heart rate in atrial fibrillation.  · Preventing the risk for blood clot and stroke using blood-thinning medicines. Your doctor will tell you what he or she recommends. Choices may include aspirin, clopidogrel, warfarin, dabigatran, rivaroxaban, apixaban, and edoxaban.  · Doing catheter ablation or a surgical maze procedure. These use different methods to destroy certain areas of heart tissue. This interrupts the electrical signals causing atrial fibrillation. One of these procedures may be a choice when medicines do not work, or as an alternative to long-term medicine.  · Other treatment choices may be recommended for you by your doctor.  Managing risk factors for stroke and preventing heart failure are important parts of any treatment plan for atrial fibrillation.  Home care  · Take your medicines exactly as directed. Dont skip doses.  · Work with your doctor to find the right medicines and doses for you.  · Learn to take your own pulse. Keep a record of your results. Ask your doctor which pulse rates mean that you need medical attention. Slowing your pulse is often the goal of treatment. Ask your doctor if its OK for you to use an automatic machine to check your pulse at home. Sometimes these machines dont count the pulse correctly when you have atrial fibrillation.  · Limit your intake of coffee, tea, cola, and other beverages with caffeine. Talk with your doctor about whether you should eliminate caffeine.  · Avoid over-the-counter medicines that have caffeine in them.  · Let your doctor know what medicines you take, including  prescription and over-the-counter medicines, as well as any supplements. They interfere with some medicines given for atrial fibrillation.  · Ask your doctor about whether you can drink alcohol. Some people need to avoid alcohol to better treat atrial fibrillation. If you are taking blood-thinner medicines, alcohol may interfere with them by increasing their effect.  · Never take stimulants such as amphetamines or cocaine. These drugs can speed up your heart rate and trigger atrial fibrillation.  Follow-up care  Follow up with your doctor, or as advised.     When should I call my healthcare provider  Call your healthcare provider right away if you have any of the following:  · Weakness  · Dizziness  · Fainting  · Fatigue  · Shortness of breath  · Chest pain with increased activity  · A change in the usual regularity of your heartbeat, or an unusually fast heartbeat   Date Last Reviewed: 4/23/2016  © 0581-7005 [employer]. [address] [place] [address], [place] [address]. All rights reserved. This information is not intended as a substitute for professional medical care. Always follow your healthcare professional's instructions.

## 2020-07-01 ENCOUNTER — TELEPHONE (OUTPATIENT)
Dept: CARDIOLOGY | Facility: CLINIC | Age: 80
End: 2020-07-01

## 2020-07-01 NOTE — TELEPHONE ENCOUNTER
Spoke with patient gave her an appointment with Dr. Mckeon.      ----- Message from Brenda Rivas sent at 7/1/2020  1:21 PM CDT -----  Contact: TAQUERIA SWEET [[id number]]  Type: Patient Call Back    Who called: TAQUERIA SWEET [[id number]]    What is the request in detail: Patient is requesting a call back. She states that she would like to schedule a 2 week hospital follow up appointment.   Please advise.    Can the clinic reply by [facility]? No    Best call back number: [phone]    Additional Information: N/A

## 2020-07-14 ENCOUNTER — OFFICE VISIT (OUTPATIENT)
Dept: CARDIOLOGY | Facility: CLINIC | Age: 80
End: 2020-07-14
Attending: INTERNAL MEDICINE
Payer: MEDICARE

## 2020-07-14 ENCOUNTER — LAB VISIT (OUTPATIENT)
Dept: LAB | Facility: OTHER | Age: 80
End: 2020-07-14
Attending: INTERNAL MEDICINE
Payer: MEDICARE

## 2020-07-14 VITALS
DIASTOLIC BLOOD PRESSURE: 78 MMHG | WEIGHT: 127 LBS | HEIGHT: 64 IN | HEART RATE: 72 BPM | SYSTOLIC BLOOD PRESSURE: 122 MMHG | BODY MASS INDEX: 21.68 KG/M2

## 2020-07-14 DIAGNOSIS — I48.21 PERMANENT ATRIAL FIBRILLATION: ICD-10-CM

## 2020-07-14 DIAGNOSIS — I34.0 NONRHEUMATIC MITRAL VALVE REGURGITATION: ICD-10-CM

## 2020-07-14 DIAGNOSIS — I50.32 HEART FAILURE, DIASTOLIC, CHRONIC: ICD-10-CM

## 2020-07-14 DIAGNOSIS — I27.22 PULMONARY HYPERTENSION DUE TO LEFT HEART DISEASE: ICD-10-CM

## 2020-07-14 DIAGNOSIS — Z79.01 CHRONIC ANTICOAGULATION: ICD-10-CM

## 2020-07-14 DIAGNOSIS — I10 ESSENTIAL HYPERTENSION: ICD-10-CM

## 2020-07-14 DIAGNOSIS — G43.009 MIGRAINE WITHOUT AURA AND WITHOUT STATUS MIGRAINOSUS, NOT INTRACTABLE: ICD-10-CM

## 2020-07-14 LAB
ALBUMIN SERPL BCP-MCNC: 3.5 G/DL (ref 3.5–5.2)
ALP SERPL-CCNC: 96 U/L (ref 55–135)
ALT SERPL W/O P-5'-P-CCNC: 36 U/L (ref 10–44)
ANION GAP SERPL CALC-SCNC: 9 MMOL/L (ref 8–16)
AST SERPL-CCNC: 39 U/L (ref 10–40)
BASOPHILS # BLD AUTO: 0.03 K/UL (ref 0–0.2)
BASOPHILS NFR BLD: 0.4 % (ref 0–1.9)
BILIRUB SERPL-MCNC: 0.3 MG/DL (ref 0.1–1)
BNP SERPL-MCNC: 267 PG/ML (ref 0–99)
BUN SERPL-MCNC: 20 MG/DL (ref 8–23)
CALCIUM SERPL-MCNC: 10 MG/DL (ref 8.7–10.5)
CHLORIDE SERPL-SCNC: 99 MMOL/L (ref 95–110)
CO2 SERPL-SCNC: 25 MMOL/L (ref 23–29)
CREAT SERPL-MCNC: 0.7 MG/DL (ref 0.5–1.4)
DIFFERENTIAL METHOD: ABNORMAL
DIGOXIN SERPL-MCNC: 1.1 NG/ML (ref 0.8–2)
EOSINOPHIL # BLD AUTO: 0.3 K/UL (ref 0–0.5)
EOSINOPHIL NFR BLD: 3.8 % (ref 0–8)
ERYTHROCYTE [DISTWIDTH] IN BLOOD BY AUTOMATED COUNT: 11.9 % (ref 11.5–14.5)
EST. GFR  (AFRICAN AMERICAN): >60 ML/MIN/1.73 M^2
EST. GFR  (NON AFRICAN AMERICAN): >60 ML/MIN/1.73 M^2
GLUCOSE SERPL-MCNC: 104 MG/DL (ref 70–110)
HCT VFR BLD AUTO: 38.1 % (ref 37–48.5)
HGB BLD-MCNC: 12.1 G/DL (ref 12–16)
IMM GRANULOCYTES # BLD AUTO: 0.02 K/UL (ref 0–0.04)
IMM GRANULOCYTES NFR BLD AUTO: 0.3 % (ref 0–0.5)
LYMPHOCYTES # BLD AUTO: 2.2 K/UL (ref 1–4.8)
LYMPHOCYTES NFR BLD: 30.1 % (ref 18–48)
MCH RBC QN AUTO: 30.6 PG (ref 27–31)
MCHC RBC AUTO-ENTMCNC: 31.8 G/DL (ref 32–36)
MCV RBC AUTO: 97 FL (ref 82–98)
MONOCYTES # BLD AUTO: 0.6 K/UL (ref 0.3–1)
MONOCYTES NFR BLD: 8.1 % (ref 4–15)
NEUTROPHILS # BLD AUTO: 4.1 K/UL (ref 1.8–7.7)
NEUTROPHILS NFR BLD: 57.3 % (ref 38–73)
NRBC BLD-RTO: 0 /100 WBC
PLATELET # BLD AUTO: 285 K/UL (ref 150–350)
PMV BLD AUTO: 9.5 FL (ref 9.2–12.9)
POTASSIUM SERPL-SCNC: 4.6 MMOL/L (ref 3.5–5.1)
PROT SERPL-MCNC: 7.9 G/DL (ref 6–8.4)
RBC # BLD AUTO: 3.95 M/UL (ref 4–5.4)
SODIUM SERPL-SCNC: 133 MMOL/L (ref 136–145)
WBC # BLD AUTO: 7.14 K/UL (ref 3.9–12.7)

## 2020-07-14 PROCEDURE — 36415 COLL VENOUS BLD VENIPUNCTURE: CPT

## 2020-07-14 PROCEDURE — 85025 COMPLETE CBC W/AUTO DIFF WBC: CPT

## 2020-07-14 PROCEDURE — 80053 COMPREHEN METABOLIC PANEL: CPT

## 2020-07-14 PROCEDURE — 99999 PR PBB SHADOW E&M-EST. PATIENT-LVL III: CPT | Mod: PBBFAC,,, | Performed by: INTERNAL MEDICINE

## 2020-07-14 PROCEDURE — 99215 OFFICE O/P EST HI 40 MIN: CPT | Mod: S$PBB,,, | Performed by: INTERNAL MEDICINE

## 2020-07-14 PROCEDURE — 99213 OFFICE O/P EST LOW 20 MIN: CPT | Mod: PBBFAC | Performed by: INTERNAL MEDICINE

## 2020-07-14 PROCEDURE — 99215 PR OFFICE/OUTPT VISIT, EST, LEVL V, 40-54 MIN: ICD-10-PCS | Mod: S$PBB,,, | Performed by: INTERNAL MEDICINE

## 2020-07-14 PROCEDURE — 80162 ASSAY OF DIGOXIN TOTAL: CPT

## 2020-07-14 PROCEDURE — 99999 PR PBB SHADOW E&M-EST. PATIENT-LVL III: ICD-10-PCS | Mod: PBBFAC,,, | Performed by: INTERNAL MEDICINE

## 2020-07-14 PROCEDURE — 83880 ASSAY OF NATRIURETIC PEPTIDE: CPT

## 2020-07-14 RX ORDER — SPIRONOLACTONE 25 MG/1
25 TABLET ORAL DAILY
Qty: 90 TABLET | Refills: 3 | Status: SHIPPED | OUTPATIENT
Start: 2020-07-14 | End: 2021-06-23

## 2020-07-14 RX ORDER — FUROSEMIDE 20 MG/1
20 TABLET ORAL DAILY
Qty: 90 TABLET | Refills: 3 | Status: SHIPPED | OUTPATIENT
Start: 2020-07-14 | End: 2021-06-23

## 2020-07-14 RX ORDER — DIGOXIN 250 MCG
0.25 TABLET ORAL DAILY
Qty: 90 TABLET | Refills: 3 | Status: SHIPPED | OUTPATIENT
Start: 2020-07-14 | End: 2021-06-23

## 2020-07-14 RX ORDER — METOPROLOL TARTRATE 100 MG/1
100 TABLET ORAL 2 TIMES DAILY
Qty: 180 TABLET | Refills: 3 | Status: SHIPPED | OUTPATIENT
Start: 2020-07-14 | End: 2021-06-23

## 2020-07-14 RX ORDER — LISINOPRIL 40 MG/1
40 TABLET ORAL DAILY
Qty: 90 TABLET | Refills: 3 | Status: SHIPPED | OUTPATIENT
Start: 2020-07-14 | End: 2021-06-25 | Stop reason: SDUPTHER

## 2020-07-14 NOTE — PROGRESS NOTES
Subjective:     Ellen Neves is a 80 y.o. female with hypertension. On about 5/20/2020 she had nausea and vomited. She felt weak. At that time she also noted that her heart rate had become elevated. She mostly recorded ar heart rate 100-120 bpm. She continued to record an elevated heart rate over the coming weeks. On about 6/15/2020 she felt increasingly weak and began being short of breath. She noted her abdomen had become mildly distended. She saw Dr. Abran Terrazas on 6/18/2020 and was referred to the ER at [facility] for admission. She was in atrial fibrillation with a VRR of about 150 bpm on presentation. She denied any chest pain.  She received diltiazem iv and metoprolol po for rate control. Her rate was difficult to control. Digoxin was later added. On 6/19/2020 she had an Echo that revealed normal left ventricular size with low normal systolic function with EF of 50%. The LA was severely dilated as was the RA. There was moderate aortic valve sclerosis, mild to moderate aortic regurgitation and moderate MR. In addition there was moderate TR and the SPAP was 56 mmHg. On 6/19/2020 she had a spell of agitation and confusion. She was released for follow up. She denies any exertional chest pain or exertional dyspnea. o palpitations or weak spells. No bleeding. Feeling quite well overall.      Atrial Fibrillation  Presents for follow-up visit. Symptoms include hypertension and weakness. Symptoms are negative for bradycardia, chest pain, dizziness, hypotension, palpitations, shortness of breath, syncope and tachycardia. The symptoms have been improving. Past medical history includes atrial fibrillation and CHF.   Congestive Heart Failure  Presents for follow-up visit. Pertinent negatives include no abdominal pain, chest pain, chest pressure, claudication, edema, fatigue, muscle weakness, near-syncope, nocturia, orthopnea, palpitations, paroxysmal nocturnal dyspnea, shortness of breath or unexpected weight change. The  symptoms have been improving.   Hypertension  This is a chronic problem. The current episode started more than 1 year ago. The problem is unchanged. The problem is controlled. Pertinent negatives include no anxiety, blurred vision, chest pain, headaches, malaise/fatigue, neck pain, orthopnea, palpitations, peripheral edema, PND, shortness of breath or sweats.       Review of Systems   Constitution: Negative for chills, fatigue, fever, malaise/fatigue and unexpected weight change.   HENT: Negative for nosebleeds.    Eyes: Negative for blurred vision, double vision, vision loss in left eye and vision loss in right eye.   Cardiovascular: Negative for chest pain, claudication, dyspnea on exertion, irregular heartbeat, leg swelling, near-syncope, orthopnea, palpitations, paroxysmal nocturnal dyspnea and syncope.   Respiratory: Negative for cough, hemoptysis, shortness of breath and wheezing.    Endocrine: Negative for cold intolerance and heat intolerance.   Hematologic/Lymphatic: Negative for bleeding problem. Does not bruise/bleed easily.   Skin: Negative for color change and rash.   Musculoskeletal: Negative for back pain, falls, muscle weakness, myalgias and neck pain.   Gastrointestinal: Negative for abdominal pain, heartburn, hematemesis, hematochezia, hemorrhoids, jaundice, melena, nausea and vomiting.   Genitourinary: Negative for dysuria, hematuria and nocturia.   Neurological: Positive for weakness. Negative for dizziness, focal weakness, headaches, light-headedness, loss of balance, numbness and vertigo.   Psychiatric/Behavioral: Negative for altered mental status, depression and memory loss. The patient is not nervous/anxious.    Allergic/Immunologic: Negative for hives and persistent infections.       Current Outpatient Medications on File Prior to Visit   Medication Sig Dispense Refill    acetaminophen (TYLENOL) 500 MG tablet Take 2 tablets (1,000 mg total) by mouth every 8 (eight) hours as needed for  "Pain.  0    apixaban (ELIQUIS) 2.5 mg Tab Take 1 tablet (2.5 mg total) by mouth 2 (two) times daily. 60 tablet 0    citalopram (CELEXA) 20 MG tablet TAKE 1 TABLET DAILY 90 tablet 3    digoxin (LANOXIN) 250 mcg tablet Take 1 tablet (0.25 mg total) by mouth once daily. 30 tablet 0    furosemide (LASIX) 20 MG tablet Take 1 tablet (20 mg total) by mouth once daily. 30 tablet 0    lisinopriL (PRINIVIL,ZESTRIL) 40 MG tablet Take 1 tablet (40 mg total) by mouth once daily. 30 tablet 0    metoprolol tartrate (LOPRESSOR) 100 MG tablet Take 1 tablet (100 mg total) by mouth 2 (two) times daily. 60 tablet 0    polyethylene glycol (GLYCOLAX) 17 gram/dose powder mix 17 gm and  take by mouth once daily. 238 g 0    spironolactone (ALDACTONE) 25 MG tablet Take 1 tablet (25 mg total) by mouth once daily. 30 tablet 0    traMADoL (ULTRAM) 50 mg tablet TAKE 1 TABLET BY MOUTH EVERY 6-8 HOURS AS NEEDED FOR PAIN More than 7 day quantity medically necessary 100 tablet 0    vit C,A-Hk-oczqr-lutein-zeaxan (PRESERVISION AREDS 2) 795-231-61-1 mg-unit-mg-mg Cap Take 1 each by mouth 2 (two) times a day.       No current facility-administered medications on file prior to visit.        /78 (BP Location: Right arm, Patient Position: Sitting)   Pulse 72   Ht 5' 4" (1.626 m)   Wt 57.6 kg (126 lb 15.8 oz)   BMI 21.80 kg/m²       Objective:     Physical Exam   Constitutional: She is oriented to person, place, and time. She appears well-developed and well-nourished.  Non-toxic appearance. No distress.   HENT:   Head: Normocephalic and atraumatic.   Nose: Nose normal.   Eyes: Right eye exhibits no discharge. Left eye exhibits no discharge. Right conjunctiva is not injected. Left conjunctiva is not injected. Right pupil is round. Left pupil is round. Pupils are equal.   Neck: Neck supple. No JVD present. Carotid bruit is not present. No thyromegaly present.   Cardiovascular: Normal rate, S1 normal and S2 normal. An irregularly irregular " rhythm present.  No extrasystoles are present. PMI is not displaced. Exam reveals no gallop.   Murmur heard.  High-pitched blowing holosystolic murmur is present with a grade of 2/6 at the apex.  Pulses:       Radial pulses are 2+ on the right side and 2+ on the left side.        Femoral pulses are 2+ on the right side and 2+ on the left side.       Dorsalis pedis pulses are 2+ on the right side and 2+ on the left side.        Posterior tibial pulses are 2+ on the right side and 2+ on the left side.   Pulmonary/Chest: Effort normal and breath sounds normal.   Abdominal: Soft. Normal appearance. There is no hepatosplenomegaly. There is no abdominal tenderness.   Musculoskeletal:      Right ankle: She exhibits no swelling, no ecchymosis and no deformity.      Left ankle: She exhibits no swelling, no ecchymosis and no deformity.   Lymphadenopathy:        Head (right side): No submandibular adenopathy present.        Head (left side): No submandibular adenopathy present.     She has no cervical adenopathy.   Neurological: She is alert and oriented to person, place, and time. She is not disoriented. No cranial nerve deficit.   Skin: Skin is warm, dry and intact. No rash noted. She is not diaphoretic.   Psychiatric: She has a normal mood and affect. Her speech is normal and behavior is normal. Judgment and thought content normal. Cognition and memory are normal.       Assessment:     1. Permanent atrial fibrillation    2. Chronic anticoagulation    3. Heart failure, diastolic, chronic    4. Nonrheumatic mitral valve regurgitation    5. Pulmonary hypertension due to left heart disease    6. Essential hypertension    7. Migraine without aura and without status migrainosus, not intractable        Plan:     1. Atrial Fibrillation              5/20/2020: Clinical onset of persistent atrial fibrillation.              6/18/2020: Presents with fast VRR. Received diltiazem iv.               [id number] (CHA2Sc).               6/19/2020: Began digoxin.              On metoprolol 100 mg Q12 and digoxin 0.125 mg Q24.              Anticoagulation with apixiban.              6/21/2020: VRR on high side.              6/21/2020: Could possibly consider RAYSA guided CV in the future but likelihood on long term success with rhythm control strategy would be low. Long discussion with patient about options. 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 We decided on a rate control strategy at least for now which I agree is very reasonable.              6/22/2020: Digoxin 1.0.              On metoprolol 100 mg Q12 and digoxin 0.25 mg Q24.              Appears rate reasonably well controlled.   7/14/2020: Do Holter.   Check CBC, CMP, BNP and dig level.                  2. Chronic Anticoagulation              [id number] (CHA2Sc).              Anticoagulation with apixiban.              Apixiban 2.5 mg Q12.              No aspirin or NSAID.     3. Heart Failure, Diastolic, Chronic              6/19/2020: Echo: Normal left ventricular size with low normal systolic function. EF 50%. Severely dilated LA. Severely dilated RA. Moderate aortic valve sclerosis. Mild to moderate AR. Moderate MR. Moderate TR. SPAP 56 mmHg.               6/18/2020: Presented in HF. Received furosemide 20 mg iv Q12.              6/22/2020: .              On lisinopril 40 mg Q24, furosemide 20 mg Q24 and spironolactone 25 mg Q24.              Appears well compesated     4. Mitral Regurgitation              6/19/2020: Echo: Moderate MR.     5. Pulmonary Hypertension              6/19/2020: Echo: SPAP 56 mmHg.               6. Hypertension              At home been on nadolol 40 mg Q12 and lisinopril 40 mg Q24.              On metoprolol 100 mg Q12, lisinopril 40 mg Q24, furosemide 20 mg Q24 and spironolactone 25 mg Q24.   Keeping log at home.   Well controlled.     7. Hyponatremia               6/20/2020: Na 128.              Fluid restriction.              6/22/2020: Na 135.              Improving.      8. Migraine               Occasional tramadol.     9. History of Confusion              6/19/2020: Agitation and confusion.              Resolved.     10. Primary Care              Dr. Abran Terrazas.     F/u 4 weeks.    Donnell Mckeon M.D.      7/29/2020 3:47 AM, Addendum:    7/28/2020: Holter: Atrial fibrillation 94 () bpm. No pauses. 8 VPC's. No symptoms.    VRR overall well controlled.    Stay on same regimen.    The patient will be informed about result.    Donnell Mckeon M.D.

## 2020-07-21 ENCOUNTER — TELEPHONE (OUTPATIENT)
Dept: CARDIOLOGY | Facility: CLINIC | Age: 80
End: 2020-07-21

## 2020-07-21 DIAGNOSIS — I50.32 HEART FAILURE, DIASTOLIC, CHRONIC: Primary | ICD-10-CM

## 2020-07-21 NOTE — TELEPHONE ENCOUNTER
Spoke with patient    ----- Message from Milagro Vazquez sent at 7/21/2020  3:43 PM CDT -----      Name of Who is Calling: TAQUERIA SWEET [[id number]]      What is the request in detail: Pt called to speak with the nurse.Please contact to further discuss and advise.          Can the clinic reply by [facility]: N      What [facility]: [phone]

## 2020-07-22 ENCOUNTER — TELEPHONE (OUTPATIENT)
Dept: CARDIOLOGY | Facility: CLINIC | Age: 80
End: 2020-07-22

## 2020-07-22 NOTE — TELEPHONE ENCOUNTER
Patient received medicine.    ----- Message from Jan Estrada sent at 7/22/2020  8:50 AM CDT -----   Name of Who is Calling:     What is the request in detail:  patient request call back in reference to medication  //patient state medication has not arrived and  request send to local pharmacy Please contact to further discuss and advise      Can the clinic reply by [facility]: no     What Number to Call Back if not in [facility]:  [email or web address]  @ [employer] / [phone] /

## 2020-07-22 NOTE — TELEPHONE ENCOUNTER
Patient received medicine.    ----- Message from Brenda Rivas sent at 7/22/2020 11:56 AM CDT -----  Contact: TAQUERIA SWEET [[id number]]  Type: Patient Call Back    Who called:TAQUERIA SWEET [[id number]]    What is the request in detail: Patient states that you can disregard the previous message regarding the refills. She states that she received them in the mail today.   Please advise.    Can the clinic reply by [facility]? No    Best call back number: [phone]    Additional Information: N/A

## 2020-07-23 ENCOUNTER — HOSPITAL ENCOUNTER (OUTPATIENT)
Dept: CARDIOLOGY | Facility: OTHER | Age: 80
Discharge: HOME | End: 2020-07-23
Attending: INTERNAL MEDICINE
Payer: MEDICARE

## 2020-07-23 DIAGNOSIS — I48.21 PERMANENT ATRIAL FIBRILLATION: ICD-10-CM

## 2020-07-23 PROCEDURE — 93227 XTRNL ECG REC<48 HR R&I: CPT | Mod: ,,, | Performed by: INTERNAL MEDICINE

## 2020-07-23 PROCEDURE — 93227 HOLTER MONITOR - 24 HOUR (CUPID ONLY): ICD-10-PCS | Mod: ,,, | Performed by: INTERNAL MEDICINE

## 2020-07-23 PROCEDURE — 93225 XTRNL ECG REC<48 HRS REC: CPT

## 2020-07-29 LAB
OHS CV EVENT MONITOR DAY: 0
OHS CV HOLTER LENGTH DECIMAL HOURS: 24
OHS CV HOLTER LENGTH HOURS: 24
OHS CV HOLTER LENGTH MINUTES: 0

## 2020-08-05 ENCOUNTER — LAB VISIT (OUTPATIENT)
Dept: LAB | Facility: OTHER | Age: 80
End: 2020-08-05
Attending: INTERNAL MEDICINE
Payer: MEDICARE

## 2020-08-05 DIAGNOSIS — I50.32 HEART FAILURE, DIASTOLIC, CHRONIC: ICD-10-CM

## 2020-08-05 LAB
ANION GAP SERPL CALC-SCNC: 12 MMOL/L (ref 8–16)
BUN SERPL-MCNC: 18 MG/DL (ref 8–23)
CALCIUM SERPL-MCNC: 9.8 MG/DL (ref 8.7–10.5)
CHLORIDE SERPL-SCNC: 104 MMOL/L (ref 95–110)
CO2 SERPL-SCNC: 23 MMOL/L (ref 23–29)
CREAT SERPL-MCNC: 0.8 MG/DL (ref 0.5–1.4)
EST. GFR  (AFRICAN AMERICAN): >60 ML/MIN/1.73 M^2
EST. GFR  (NON AFRICAN AMERICAN): >60 ML/MIN/1.73 M^2
GLUCOSE SERPL-MCNC: 119 MG/DL (ref 70–110)
POTASSIUM SERPL-SCNC: 4.1 MMOL/L (ref 3.5–5.1)
SODIUM SERPL-SCNC: 139 MMOL/L (ref 136–145)

## 2020-08-05 PROCEDURE — 80048 BASIC METABOLIC PNL TOTAL CA: CPT

## 2020-08-05 PROCEDURE — 36415 COLL VENOUS BLD VENIPUNCTURE: CPT

## 2020-08-31 ENCOUNTER — OFFICE VISIT (OUTPATIENT)
Dept: CARDIOLOGY | Facility: CLINIC | Age: 80
End: 2020-08-31
Attending: INTERNAL MEDICINE
Payer: MEDICARE

## 2020-08-31 VITALS
DIASTOLIC BLOOD PRESSURE: 73 MMHG | HEIGHT: 64 IN | BODY MASS INDEX: 20.89 KG/M2 | WEIGHT: 122.38 LBS | HEART RATE: 70 BPM | SYSTOLIC BLOOD PRESSURE: 135 MMHG

## 2020-08-31 DIAGNOSIS — I34.0 NONRHEUMATIC MITRAL VALVE REGURGITATION: ICD-10-CM

## 2020-08-31 DIAGNOSIS — Z79.01 CHRONIC ANTICOAGULATION: ICD-10-CM

## 2020-08-31 DIAGNOSIS — I10 ESSENTIAL HYPERTENSION: ICD-10-CM

## 2020-08-31 DIAGNOSIS — I48.21 PERMANENT ATRIAL FIBRILLATION: ICD-10-CM

## 2020-08-31 DIAGNOSIS — G43.009 MIGRAINE WITHOUT AURA AND WITHOUT STATUS MIGRAINOSUS, NOT INTRACTABLE: ICD-10-CM

## 2020-08-31 DIAGNOSIS — I27.22 PULMONARY HYPERTENSION DUE TO LEFT HEART DISEASE: ICD-10-CM

## 2020-08-31 DIAGNOSIS — I50.32 HEART FAILURE, DIASTOLIC, CHRONIC: ICD-10-CM

## 2020-08-31 PROCEDURE — 99999 PR PBB SHADOW E&M-EST. PATIENT-LVL III: CPT | Mod: PBBFAC,,, | Performed by: INTERNAL MEDICINE

## 2020-08-31 PROCEDURE — 99999 PR PBB SHADOW E&M-EST. PATIENT-LVL III: ICD-10-PCS | Mod: PBBFAC,,, | Performed by: INTERNAL MEDICINE

## 2020-08-31 PROCEDURE — 99214 OFFICE O/P EST MOD 30 MIN: CPT | Mod: S$PBB,,, | Performed by: INTERNAL MEDICINE

## 2020-08-31 PROCEDURE — 99213 OFFICE O/P EST LOW 20 MIN: CPT | Mod: PBBFAC | Performed by: INTERNAL MEDICINE

## 2020-08-31 PROCEDURE — 99214 PR OFFICE/OUTPT VISIT, EST, LEVL IV, 30-39 MIN: ICD-10-PCS | Mod: S$PBB,,, | Performed by: INTERNAL MEDICINE

## 2020-08-31 RX ORDER — AMOXICILLIN 500 MG/1
CAPSULE ORAL
COMMUNITY
Start: 2020-07-29 | End: 2021-01-28

## 2020-08-31 RX ORDER — OXYCODONE AND ACETAMINOPHEN 7.5; 325 MG/1; MG/1
TABLET ORAL
COMMUNITY
Start: 2020-07-29 | End: 2023-02-06

## 2020-08-31 NOTE — PROGRESS NOTES
Subjective:     Ellen Neves is a 80 y.o. female with hypertension. On about 5/20/2020 she had nausea and vomited. She felt weak. At that time she also noted that her heart rate had become elevated. She mostly recorded ar heart rate 100-120 bpm. She continued to record an elevated heart rate over the coming weeks. On about 6/15/2020 she felt increasingly weak and began being short of breath. She noted her abdomen had become mildly distended. She saw Dr. Abran Terrazas on 6/18/2020 and was referred to the ER at [facility] for admission. She was in atrial fibrillation with a VRR of about 150 bpm on presentation. She denied any chest pain.  She received diltiazem iv and metoprolol po for rate control. Her rate was difficult to control. Digoxin was later added. On 6/19/2020 she had an Echo that revealed normal left ventricular size with low normal systolic function with EF of 50%. The LA was severely dilated as was the RA. There was moderate aortic valve sclerosis, mild to moderate aortic regurgitation and moderate MR. In addition there was moderate TR and the SPAP was 56 mmHg. On 6/19/2020 she had a spell of agitation and confusion. She was released for follow up. She denies any exertional chest pain or exertional dyspnea. No palpitations or weak spells. No bleeding. Feeling quite well overall.      Atrial Fibrillation  Presents for follow-up visit. Symptoms include hypertension and weakness. Symptoms are negative for bradycardia, chest pain, dizziness, hypotension, palpitations, shortness of breath, syncope and tachycardia. The symptoms have been stable. Past medical history includes atrial fibrillation and CHF.   Congestive Heart Failure  Presents for follow-up visit. Pertinent negatives include no abdominal pain, chest pain, chest pressure, claudication, edema, fatigue, muscle weakness, near-syncope, nocturia, orthopnea, palpitations, paroxysmal nocturnal dyspnea, shortness of breath or unexpected weight change. The  symptoms have been stable.   Hypertension  This is a chronic problem. The current episode started more than 1 year ago. The problem is unchanged. The problem is controlled. Pertinent negatives include no anxiety, blurred vision, chest pain, headaches, malaise/fatigue, neck pain, orthopnea, palpitations, peripheral edema, PND, shortness of breath or sweats.       Review of Systems   Constitution: Negative for chills, fatigue, fever, malaise/fatigue and unexpected weight change.   HENT: Negative for nosebleeds.    Eyes: Positive for vision loss in left eye and vision loss in right eye. Negative for blurred vision and double vision.   Cardiovascular: Negative for chest pain, claudication, dyspnea on exertion, irregular heartbeat, leg swelling, near-syncope, orthopnea, palpitations, paroxysmal nocturnal dyspnea and syncope.   Respiratory: Negative for cough, hemoptysis, shortness of breath and wheezing.    Endocrine: Negative for cold intolerance and heat intolerance.   Hematologic/Lymphatic: Negative for bleeding problem. Does not bruise/bleed easily.   Skin: Negative for color change and rash.   Musculoskeletal: Negative for back pain, falls, muscle weakness, myalgias and neck pain.   Gastrointestinal: Negative for abdominal pain, heartburn, hematemesis, hematochezia, hemorrhoids, jaundice, melena, nausea and vomiting.   Genitourinary: Negative for dysuria, hematuria and nocturia.   Neurological: Positive for weakness. Negative for dizziness, focal weakness, headaches, light-headedness, loss of balance, numbness and vertigo.   Psychiatric/Behavioral: Negative for altered mental status, depression and memory loss. The patient is not nervous/anxious.    Allergic/Immunologic: Negative for hives and persistent infections.       Current Outpatient Medications on File Prior to Visit   Medication Sig Dispense Refill    acetaminophen (TYLENOL) 500 MG tablet Take 2 tablets (1,000 mg total) by mouth every 8 (eight) hours as  "needed for Pain.  0    amoxicillin (AMOXIL) 500 MG capsule       apixaban (ELIQUIS) 2.5 mg Tab Take 1 tablet (2.5 mg total) by mouth 2 (two) times daily. 180 tablet 3    citalopram (CELEXA) 20 MG tablet TAKE 1 TABLET DAILY 90 tablet 3    digoxin (LANOXIN) 250 mcg tablet Take 1 tablet (0.25 mg total) by mouth once daily. 90 tablet 3    furosemide (LASIX) 20 MG tablet Take 1 tablet (20 mg total) by mouth once daily. 90 tablet 3    lisinopriL (PRINIVIL,ZESTRIL) 40 MG tablet Take 1 tablet (40 mg total) by mouth once daily. 90 tablet 3    metoprolol tartrate (LOPRESSOR) 100 MG tablet Take 1 tablet (100 mg total) by mouth 2 (two) times daily. 180 tablet 3    spironolactone (ALDACTONE) 25 MG tablet Take 1 tablet (25 mg total) by mouth once daily. 90 tablet 3    temazepam (RESTORIL) 15 mg Cap Take 1 capsule (15 mg total) by mouth nightly as needed. 30 capsule 0    traMADoL (ULTRAM) 50 mg tablet TAKE 1 TABLET BY MOUTH EVERY 6-8 HOURS AS NEEDED FOR PAIN More than 7 day quantity medically necessary 100 tablet 0    vit C,S-Ti-wegbz-lutein-zeaxan (PRESERVISION AREDS 2) 327-386-30-1 mg-unit-mg-mg Cap Take 1 each by mouth 2 (two) times a day.      oxyCODONE-acetaminophen (PERCOCET) 7.5-325 mg per tablet TK 1 T PO Q 6 H PRN P       No current facility-administered medications on file prior to visit.        /73 (BP Location: Right arm, Patient Position: Sitting, BP Method: Large (Automatic))   Pulse 70   Ht 5' 4" (1.626 m)   Wt 55.5 kg (122 lb 5.7 oz)   BMI 21.00 kg/m²       Objective:     Physical Exam   Constitutional: She is oriented to person, place, and time. She appears well-developed and well-nourished.  Non-toxic appearance. No distress.   HENT:   Head: Normocephalic and atraumatic.   Nose: Nose normal.   Eyes: Right eye exhibits no discharge. Left eye exhibits no discharge. Right conjunctiva is not injected. Left conjunctiva is not injected. Right pupil is round. Left pupil is round. Pupils are equal. "   Neck: Neck supple. No JVD present. Carotid bruit is not present. No thyromegaly present.   Cardiovascular: Normal rate, S1 normal and S2 normal. An irregularly irregular rhythm present.  No extrasystoles are present. PMI is not displaced. Exam reveals no gallop.   Murmur heard.  High-pitched blowing holosystolic murmur is present with a grade of 2/6 at the apex.  Pulses:       Radial pulses are 2+ on the right side and 2+ on the left side.        Femoral pulses are 2+ on the right side and 2+ on the left side.       Dorsalis pedis pulses are 2+ on the right side and 2+ on the left side.        Posterior tibial pulses are 2+ on the right side and 2+ on the left side.   Pulmonary/Chest: Effort normal and breath sounds normal.   Abdominal: Soft. Normal appearance. There is no hepatosplenomegaly. There is no abdominal tenderness.   Musculoskeletal:      Right ankle: She exhibits no swelling, no ecchymosis and no deformity.      Left ankle: She exhibits no swelling, no ecchymosis and no deformity.   Lymphadenopathy:        Head (right side): No submandibular adenopathy present.        Head (left side): No submandibular adenopathy present.     She has no cervical adenopathy.   Neurological: She is alert and oriented to person, place, and time. She is not disoriented. No cranial nerve deficit.   Skin: Skin is warm, dry and intact. No rash noted. She is not diaphoretic.   Psychiatric: She has a normal mood and affect. Her speech is normal and behavior is normal. Judgment and thought content normal. Cognition and memory are normal.       Assessment:     1. Permanent atrial fibrillation    2. Chronic anticoagulation    3. Heart failure, diastolic, chronic    4. Nonrheumatic mitral valve regurgitation    5. Pulmonary hypertension due to left heart disease    6. Essential hypertension    7. Migraine without aura and without status migrainosus, not intractable        Plan:     1. Atrial Fibrillation              5/20/2020:  Clinical onset of persistent atrial fibrillation.              6/18/2020: Presents with fast VRR. Received diltiazem iv.               [id number] (CHA2Sc).              6/19/2020: Began digoxin.              On metoprolol 100 mg Q12 and digoxin 0.125 mg Q24.              Anticoagulation with apixiban.              6/21/2020: VRR on high side.              6/21/2020: Could possibly consider RAYSA guided CV in the future but likelihood on long term success with rhythm control strategy would be low. Long discussion with patient about options. 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 We decided on a rate control strategy at least for now which I agree is very reasonable.              6/22/2020: Digoxin 1.0.   7/28/2020: Holter: Atrial fibrillation 94 () bpm. No pauses. 8 VPC's. No symptoms.              On metoprolol 100 mg Q12 and digoxin 0.25 mg Q24.              Appears rate reasonably well controlled.                  2. Chronic Anticoagulation              [id number]=5 (CHA2Sc).              Anticoagulation with apixiban.              On apixiban 2.5 mg Q12.              No aspirin or NSAID.     3. Heart Failure, Diastolic, Chronic              6/19/2020: Echo: Normal left ventricular size with low normal systolic function. EF 50%. Severely dilated LA. Severely dilated RA. Moderate aortic valve sclerosis. Mild to moderate AR. Moderate MR. Moderate TR. SPAP 56 mmHg.               6/18/2020: Presented in HF. Received furosemide 20 mg iv Q12.              6/22/2020: .              On lisinopril 40 mg Q24, furosemide 20 mg Q24 and spironolactone 25 mg Q24.              Appears well compesated     4. Mitral Regurgitation              6/19/2020: Echo: Moderate MR.     5. Pulmonary Hypertension              6/19/2020: Echo: SPAP 56 mmHg.               6. Hypertension   2010: Diagnosed.              Used to be on nadolol 40 mg  Q12 and lisinopril 40 mg Q24.              On metoprolol 100 mg Q12, lisinopril 40 mg Q24, furosemide 20 mg Q24 and spironolactone 25 mg Q24.   Keeping log at home.   Well controlled.     7. History of Hyponatremia              6/20/2020: Na 128.              Fluid restriction.              6/22/2020: Na 135.              Improving.      8. Migraine               Occasional tramadol.     9. History of Confusion              6/19/2020: Agitation and confusion.              Resolved.     10. Primary Care              Dr. Abran Terrazas.     F/u 3 months.    Donnell Mckeon M.D.

## 2020-10-07 ENCOUNTER — HOSPITAL ENCOUNTER (OUTPATIENT)
Dept: RADIOLOGY | Facility: HOSPITAL | Age: 80
Discharge: HOME | End: 2020-10-07
Attending: INTERNAL MEDICINE
Payer: MEDICARE

## 2020-10-07 DIAGNOSIS — Z12.39 BREAST CANCER SCREENING: ICD-10-CM

## 2020-10-07 PROCEDURE — 77063 MAMMO DIGITAL SCREENING BILAT WITH TOMO: ICD-10-PCS | Mod: 26,,, | Performed by: RADIOLOGY

## 2020-10-07 PROCEDURE — 77067 SCR MAMMO BI INCL CAD: CPT | Mod: 26,,, | Performed by: RADIOLOGY

## 2020-10-07 PROCEDURE — 77067 SCR MAMMO BI INCL CAD: CPT | Mod: TC

## 2020-10-07 PROCEDURE — 77063 BREAST TOMOSYNTHESIS BI: CPT | Mod: 26,,, | Performed by: RADIOLOGY

## 2020-10-07 PROCEDURE — 77067 MAMMO DIGITAL SCREENING BILAT WITH TOMO: ICD-10-PCS | Mod: 26,,, | Performed by: RADIOLOGY

## 2020-12-01 ENCOUNTER — OFFICE VISIT (OUTPATIENT)
Dept: CARDIOLOGY | Facility: CLINIC | Age: 80
End: 2020-12-01
Attending: INTERNAL MEDICINE
Payer: MEDICARE

## 2020-12-01 VITALS
BODY MASS INDEX: 21.3 KG/M2 | WEIGHT: 124.75 LBS | HEART RATE: 68 BPM | HEIGHT: 64 IN | DIASTOLIC BLOOD PRESSURE: 70 MMHG | SYSTOLIC BLOOD PRESSURE: 125 MMHG

## 2020-12-01 DIAGNOSIS — I34.0 NONRHEUMATIC MITRAL VALVE REGURGITATION: ICD-10-CM

## 2020-12-01 DIAGNOSIS — I27.22 PULMONARY HYPERTENSION DUE TO LEFT HEART DISEASE: ICD-10-CM

## 2020-12-01 DIAGNOSIS — Z79.01 CHRONIC ANTICOAGULATION: ICD-10-CM

## 2020-12-01 DIAGNOSIS — I50.32 HEART FAILURE, DIASTOLIC, CHRONIC: ICD-10-CM

## 2020-12-01 DIAGNOSIS — I48.21 PERMANENT ATRIAL FIBRILLATION: ICD-10-CM

## 2020-12-01 DIAGNOSIS — I10 ESSENTIAL HYPERTENSION: ICD-10-CM

## 2020-12-01 DIAGNOSIS — G43.009 MIGRAINE WITHOUT AURA AND WITHOUT STATUS MIGRAINOSUS, NOT INTRACTABLE: ICD-10-CM

## 2020-12-01 PROCEDURE — 99214 PR OFFICE/OUTPT VISIT, EST, LEVL IV, 30-39 MIN: ICD-10-PCS | Mod: S$PBB,,, | Performed by: INTERNAL MEDICINE

## 2020-12-01 PROCEDURE — 99214 OFFICE O/P EST MOD 30 MIN: CPT | Mod: S$PBB,,, | Performed by: INTERNAL MEDICINE

## 2020-12-01 PROCEDURE — 99213 OFFICE O/P EST LOW 20 MIN: CPT | Mod: PBBFAC | Performed by: INTERNAL MEDICINE

## 2020-12-01 PROCEDURE — 99999 PR PBB SHADOW E&M-EST. PATIENT-LVL III: ICD-10-PCS | Mod: PBBFAC,,, | Performed by: INTERNAL MEDICINE

## 2020-12-01 PROCEDURE — 99999 PR PBB SHADOW E&M-EST. PATIENT-LVL III: CPT | Mod: PBBFAC,,, | Performed by: INTERNAL MEDICINE

## 2020-12-01 NOTE — PROGRESS NOTES
Subjective:     Ellen Neves is a 80 y.o. female with hypertension. On about 5/20/2020 she had nausea and vomited. She felt weak. At that time she also noted that her heart rate had become elevated. She mostly recorded ar heart rate 100-120 bpm. She continued to record an elevated heart rate over the coming weeks. On about 6/15/2020 she felt increasingly weak and began being short of breath. She noted her abdomen had become mildly distended. She saw Dr. Abran Terrazas on 6/18/2020 and was referred to the ER at [facility] for admission. She was in atrial fibrillation with a VRR of about 150 bpm on presentation. She denied any chest pain.  She received diltiazem iv and metoprolol po for rate control. Her rate was difficult to control. Digoxin was later added. On 6/19/2020 she had an Echo that revealed normal left ventricular size with low normal systolic function with EF of 50%. The LA was severely dilated as was the RA. There was moderate aortic valve sclerosis, mild to moderate aortic regurgitation and moderate MR. In addition there was moderate TR and the SPAP was 56 mmHg. On 6/19/2020 she had a spell of agitation and confusion. She was released for follow up. She denies any exertional chest pain or exertional dyspnea. No palpitations or weak spells. No bleeding. Feeling quite well overall.      Atrial Fibrillation  Presents for follow-up visit. Symptoms include hypertension and weakness. Symptoms are negative for bradycardia, chest pain, dizziness, hypotension, palpitations, shortness of breath, syncope and tachycardia. The symptoms have been stable. Past medical history includes atrial fibrillation and CHF.   Congestive Heart Failure  Presents for follow-up visit. Pertinent negatives include no abdominal pain, chest pain, chest pressure, claudication, edema, fatigue, muscle weakness, near-syncope, nocturia, orthopnea, palpitations, paroxysmal nocturnal dyspnea, shortness of breath or unexpected weight change.  The symptoms have been stable.   Hypertension  This is a chronic problem. The current episode started more than 1 year ago. The problem is unchanged. The problem is controlled. Pertinent negatives include no anxiety, blurred vision, chest pain, headaches, malaise/fatigue, neck pain, orthopnea, palpitations, peripheral edema, PND, shortness of breath or sweats.       Review of Systems   Constitution: Negative for chills, fatigue, fever, malaise/fatigue and unexpected weight change.   HENT: Negative for nosebleeds.    Eyes: Positive for vision loss in left eye and vision loss in right eye. Negative for blurred vision and double vision.   Cardiovascular: Negative for chest pain, claudication, dyspnea on exertion, irregular heartbeat, leg swelling, near-syncope, orthopnea, palpitations, paroxysmal nocturnal dyspnea and syncope.   Respiratory: Negative for cough, hemoptysis, shortness of breath and wheezing.    Endocrine: Negative for cold intolerance and heat intolerance.   Hematologic/Lymphatic: Negative for bleeding problem. Does not bruise/bleed easily.   Skin: Negative for color change and rash.   Musculoskeletal: Negative for back pain, falls, muscle weakness, myalgias and neck pain.   Gastrointestinal: Negative for abdominal pain, heartburn, hematemesis, hematochezia, hemorrhoids, jaundice, melena, nausea and vomiting.   Genitourinary: Negative for dysuria, hematuria and nocturia.   Neurological: Positive for weakness. Negative for dizziness, focal weakness, headaches, light-headedness, loss of balance, numbness and vertigo.   Psychiatric/Behavioral: Negative for altered mental status, depression and memory loss. The patient is not nervous/anxious.    Allergic/Immunologic: Negative for hives and persistent infections.       Current Outpatient Medications on File Prior to Visit   Medication Sig Dispense Refill    acetaminophen (TYLENOL) 500 MG tablet Take 2 tablets (1,000 mg total) by mouth every 8 (eight) hours  "as needed for Pain.  0    apixaban (ELIQUIS) 2.5 mg Tab Take 1 tablet (2.5 mg total) by mouth 2 (two) times daily. 180 tablet 3    citalopram (CELEXA) 20 MG tablet TAKE 1 TABLET DAILY 90 tablet 3    digoxin (LANOXIN) 250 mcg tablet Take 1 tablet (0.25 mg total) by mouth once daily. 90 tablet 3    furosemide (LASIX) 20 MG tablet Take 1 tablet (20 mg total) by mouth once daily. 90 tablet 3    lisinopriL (PRINIVIL,ZESTRIL) 40 MG tablet Take 1 tablet (40 mg total) by mouth once daily. 90 tablet 3    metoprolol tartrate (LOPRESSOR) 100 MG tablet Take 1 tablet (100 mg total) by mouth 2 (two) times daily. 180 tablet 3    spironolactone (ALDACTONE) 25 MG tablet Take 1 tablet (25 mg total) by mouth once daily. 90 tablet 3    temazepam (RESTORIL) 15 mg Cap Take 1 capsule (15 mg total) by mouth nightly as needed. 30 capsule 0    traMADoL (ULTRAM) 50 mg tablet TAKE 1 TABLET BY MOUTH EVERY 6-8 HOURS AS NEEDED FOR PAIN More than 7 day quantity medically necessary 100 tablet 0    vit C,K-Dv-kqezg-lutein-zeaxan (PRESERVISION AREDS 2) 262-064-89-1 mg-unit-mg-mg Cap Take 1 each by mouth 2 (two) times a day.      amoxicillin (AMOXIL) 500 MG capsule       oxyCODONE-acetaminophen (PERCOCET) 7.5-325 mg per tablet TK 1 T PO Q 6 H PRN P       No current facility-administered medications on file prior to visit.        /70 Comment: Average at home  Pulse 68   Ht 5' 4" (1.626 m)   Wt 56.6 kg (124 lb 12.5 oz)   BMI 21.42 kg/m²       Objective:     Physical Exam   Constitutional: She is oriented to person, place, and time. She appears well-developed and well-nourished.  Non-toxic appearance. No distress.   HENT:   Head: Normocephalic and atraumatic.   Nose: Nose normal.   Eyes: Right eye exhibits no discharge. Left eye exhibits no discharge. Right conjunctiva is not injected. Left conjunctiva is not injected. Right pupil is round. Left pupil is round. Pupils are equal.   Neck: Neck supple. No JVD present. Carotid bruit is " not present. No thyromegaly present.   Cardiovascular: Normal rate, S1 normal and S2 normal. An irregularly irregular rhythm present.  No extrasystoles are present. PMI is not displaced. Exam reveals no gallop.   Murmur heard.  High-pitched blowing holosystolic murmur is present with a grade of 2/6 at the apex.  Pulses:       Radial pulses are 2+ on the right side and 2+ on the left side.        Femoral pulses are 2+ on the right side and 2+ on the left side.       Dorsalis pedis pulses are 2+ on the right side and 2+ on the left side.        Posterior tibial pulses are 2+ on the right side and 2+ on the left side.   Pulmonary/Chest: Effort normal and breath sounds normal.   Abdominal: Soft. Normal appearance. There is no hepatosplenomegaly. There is no abdominal tenderness.   Musculoskeletal:      Right ankle: She exhibits no swelling, no ecchymosis and no deformity.      Left ankle: She exhibits no swelling, no ecchymosis and no deformity.   Lymphadenopathy:        Head (right side): No submandibular adenopathy present.        Head (left side): No submandibular adenopathy present.     She has no cervical adenopathy.   Neurological: She is alert and oriented to person, place, and time. She is not disoriented. No cranial nerve deficit.   Skin: Skin is warm, dry and intact. She is not diaphoretic.   Psychiatric: She has a normal mood and affect. Her speech is normal and behavior is normal. Judgment and thought content normal. Cognition and memory are normal.       Assessment:     1. Permanent atrial fibrillation    2. Chronic anticoagulation    3. Heart failure, diastolic, chronic    4. Nonrheumatic mitral valve regurgitation    5. Pulmonary hypertension due to left heart disease    6. Essential hypertension    7. Migraine without aura and without status migrainosus, not intractable        Plan:     1. Atrial Fibrillation              5/20/2020: Clinical onset of persistent atrial fibrillation.               6/18/2020: Presents with fast VRR. Received diltiazem iv.               [id number] (CHA2Sc).              6/19/2020: Began digoxin.              On metoprolol 100 mg Q12 and digoxin 0.125 mg Q24.              Anticoagulation with apixiban.              6/21/2020: VRR on high side.              6/21/2020: Could possibly consider RAYSA guided CV in the future but likelihood on long term success with rhythm control strategy would be low. Long discussion with patient about options. 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 We decided on a rate control strategy at least for now which I agree is very reasonable.              6/22/2020: Digoxin 1.0.   7/28/2020: Holter: Atrial fibrillation 94 () bpm. No pauses. 8 VPC's. No symptoms.              On metoprolol 100 mg Q12 and digoxin 0.25 mg Q24.              Appears rate reasonably well controlled.                  2. Chronic Anticoagulation              [id number] (CHA2Sc).              Anticoagulation with apixiban.              On apixiban 2.5 mg Q12.              No aspirin or NSAID.     3. Heart Failure, Diastolic, Chronic              6/19/2020: Echo: Normal left ventricular size with low normal systolic function. EF 50%. Severely dilated LA. Severely dilated RA. Moderate aortic valve sclerosis. Mild to moderate AR. Moderate MR. Moderate TR. SPAP 56 mmHg.               6/18/2020: Presented in HF. Received furosemide 20 mg iv Q12.              6/22/2020: .              On lisinopril 40 mg Q24, furosemide 20 mg Q24 and spironolactone 25 mg Q24.              Appears well compesated     4. Mitral Regurgitation              6/19/2020: Echo: Moderate MR.     5. Pulmonary Hypertension              6/19/2020: Echo: SPAP 56 mmHg.               6. Hypertension   2010: Diagnosed.              Used to be on nadolol 40 mg Q12 and lisinopril 40 mg Q24.              On metoprolol 100 mg  Q12, lisinopril 40 mg Q24, furosemide 20 mg Q24 and spironolactone 25 mg Q24.   Keeping log at home.   Well controlled.     7. History of Hyponatremia              6/20/2020: Na 128.              Fluid restriction.              6/22/2020: Na 135.              Improving.      8. Migraine               Occasional tramadol.     9. History of Confusion              6/19/2020: Agitation and confusion.              Resolved.     10. Primary Care              Dr. Abran Terrazas.     F/u 4 months.    Donnell Mckeon M.D.

## 2021-01-09 ENCOUNTER — IMMUNIZATION (OUTPATIENT)
Dept: INTERNAL MEDICINE | Facility: CLINIC | Age: 81
End: 2021-01-09
Payer: MEDICARE

## 2021-01-09 DIAGNOSIS — Z23 NEED FOR VACCINATION: ICD-10-CM

## 2021-01-09 PROCEDURE — 91300 COVID-19, MRNA, LNP-S, PF, 30 MCG/0.3 ML DOSE VACCINE: CPT | Mod: PBBFAC,PO

## 2021-01-30 ENCOUNTER — IMMUNIZATION (OUTPATIENT)
Dept: INTERNAL MEDICINE | Facility: CLINIC | Age: 81
End: 2021-01-30
Payer: MEDICARE

## 2021-01-30 DIAGNOSIS — Z23 NEED FOR VACCINATION: Primary | ICD-10-CM

## 2021-01-30 PROCEDURE — 0002A COVID-19, MRNA, LNP-S, PF, 30 MCG/0.3 ML DOSE VACCINE: CPT | Mod: PBBFAC | Performed by: FAMILY MEDICINE

## 2021-01-30 PROCEDURE — 91300 COVID-19, MRNA, LNP-S, PF, 30 MCG/0.3 ML DOSE VACCINE: CPT | Mod: PBBFAC | Performed by: FAMILY MEDICINE

## 2021-04-29 ENCOUNTER — OFFICE VISIT (OUTPATIENT)
Dept: CARDIOLOGY | Facility: CLINIC | Age: 81
End: 2021-04-29
Attending: INTERNAL MEDICINE
Payer: MEDICARE

## 2021-04-29 VITALS
WEIGHT: 124.31 LBS | HEIGHT: 64 IN | DIASTOLIC BLOOD PRESSURE: 67 MMHG | BODY MASS INDEX: 21.22 KG/M2 | SYSTOLIC BLOOD PRESSURE: 125 MMHG | HEART RATE: 68 BPM

## 2021-04-29 DIAGNOSIS — Z79.01 CHRONIC ANTICOAGULATION: ICD-10-CM

## 2021-04-29 DIAGNOSIS — I27.22 PULMONARY HYPERTENSION DUE TO LEFT HEART DISEASE: ICD-10-CM

## 2021-04-29 DIAGNOSIS — I10 ESSENTIAL HYPERTENSION: ICD-10-CM

## 2021-04-29 DIAGNOSIS — G43.009 MIGRAINE WITHOUT AURA AND WITHOUT STATUS MIGRAINOSUS, NOT INTRACTABLE: ICD-10-CM

## 2021-04-29 DIAGNOSIS — I34.0 NONRHEUMATIC MITRAL VALVE REGURGITATION: ICD-10-CM

## 2021-04-29 DIAGNOSIS — I48.21 PERMANENT ATRIAL FIBRILLATION: ICD-10-CM

## 2021-04-29 DIAGNOSIS — I50.32 HEART FAILURE, DIASTOLIC, CHRONIC: ICD-10-CM

## 2021-04-29 PROCEDURE — 99999 PR PBB SHADOW E&M-EST. PATIENT-LVL III: CPT | Mod: PBBFAC,,, | Performed by: INTERNAL MEDICINE

## 2021-04-29 PROCEDURE — 99214 PR OFFICE/OUTPT VISIT, EST, LEVL IV, 30-39 MIN: ICD-10-PCS | Mod: S$PBB,,, | Performed by: INTERNAL MEDICINE

## 2021-04-29 PROCEDURE — 99214 OFFICE O/P EST MOD 30 MIN: CPT | Mod: S$PBB,,, | Performed by: INTERNAL MEDICINE

## 2021-04-29 PROCEDURE — 99213 OFFICE O/P EST LOW 20 MIN: CPT | Mod: PBBFAC | Performed by: INTERNAL MEDICINE

## 2021-04-29 PROCEDURE — 99999 PR PBB SHADOW E&M-EST. PATIENT-LVL III: ICD-10-PCS | Mod: PBBFAC,,, | Performed by: INTERNAL MEDICINE

## 2021-06-25 DIAGNOSIS — I50.32 HEART FAILURE, DIASTOLIC, CHRONIC: ICD-10-CM

## 2021-06-25 RX ORDER — LISINOPRIL 40 MG/1
40 TABLET ORAL DAILY
Qty: 90 TABLET | Refills: 3 | Status: SHIPPED | OUTPATIENT
Start: 2021-06-25 | End: 2022-06-20

## 2021-10-22 ENCOUNTER — OFFICE VISIT (OUTPATIENT)
Dept: CARDIOLOGY | Facility: CLINIC | Age: 81
End: 2021-10-22
Attending: INTERNAL MEDICINE
Payer: MEDICARE

## 2021-10-22 VITALS
HEART RATE: 92 BPM | OXYGEN SATURATION: 98 % | DIASTOLIC BLOOD PRESSURE: 76 MMHG | WEIGHT: 123.44 LBS | HEIGHT: 64 IN | SYSTOLIC BLOOD PRESSURE: 126 MMHG | BODY MASS INDEX: 21.07 KG/M2

## 2021-10-22 DIAGNOSIS — I27.22 PULMONARY HYPERTENSION DUE TO LEFT HEART DISEASE: ICD-10-CM

## 2021-10-22 DIAGNOSIS — Z79.01 CHRONIC ANTICOAGULATION: ICD-10-CM

## 2021-10-22 DIAGNOSIS — I34.0 NONRHEUMATIC MITRAL VALVE REGURGITATION: ICD-10-CM

## 2021-10-22 DIAGNOSIS — I10 PRIMARY HYPERTENSION: ICD-10-CM

## 2021-10-22 DIAGNOSIS — I50.32 HEART FAILURE, DIASTOLIC, CHRONIC: ICD-10-CM

## 2021-10-22 DIAGNOSIS — I48.21 PERMANENT ATRIAL FIBRILLATION: ICD-10-CM

## 2021-10-22 DIAGNOSIS — G43.009 MIGRAINE WITHOUT AURA AND WITHOUT STATUS MIGRAINOSUS, NOT INTRACTABLE: ICD-10-CM

## 2021-10-22 PROCEDURE — 93005 ELECTROCARDIOGRAM TRACING: CPT

## 2021-10-22 PROCEDURE — 93005 ELECTROCARDIOGRAM TRACING: CPT | Mod: PBBFAC | Performed by: INTERNAL MEDICINE

## 2021-10-22 PROCEDURE — 99215 PR OFFICE/OUTPT VISIT, EST, LEVL V, 40-54 MIN: ICD-10-PCS | Mod: S$PBB,25,, | Performed by: INTERNAL MEDICINE

## 2021-10-22 PROCEDURE — 99999 PR PBB SHADOW E&M-EST. PATIENT-LVL III: ICD-10-PCS | Mod: PBBFAC,,, | Performed by: INTERNAL MEDICINE

## 2021-10-22 PROCEDURE — 99215 OFFICE O/P EST HI 40 MIN: CPT | Mod: S$PBB,25,, | Performed by: INTERNAL MEDICINE

## 2021-10-22 PROCEDURE — 93010 ELECTROCARDIOGRAM REPORT: CPT | Mod: S$PBB,,, | Performed by: INTERNAL MEDICINE

## 2021-10-22 PROCEDURE — 99213 OFFICE O/P EST LOW 20 MIN: CPT | Mod: PBBFAC | Performed by: INTERNAL MEDICINE

## 2021-10-22 PROCEDURE — 93010 PR ELECTROCARDIOGRAM REPORT: ICD-10-PCS | Mod: S$PBB,,, | Performed by: INTERNAL MEDICINE

## 2021-10-22 PROCEDURE — 99999 PR PBB SHADOW E&M-EST. PATIENT-LVL III: CPT | Mod: PBBFAC,,, | Performed by: INTERNAL MEDICINE

## 2021-10-22 RX ORDER — DILTIAZEM HYDROCHLORIDE 180 MG/1
180 CAPSULE, COATED, EXTENDED RELEASE ORAL DAILY
Qty: 90 CAPSULE | Refills: 3 | Status: SHIPPED | OUTPATIENT
Start: 2021-10-22 | End: 2022-10-20

## 2021-10-22 RX ORDER — METOPROLOL SUCCINATE 100 MG/1
100 TABLET, EXTENDED RELEASE ORAL 2 TIMES DAILY
Qty: 180 TABLET | Refills: 3 | Status: SHIPPED | OUTPATIENT
Start: 2021-10-22 | End: 2022-10-20

## 2021-11-12 ENCOUNTER — IMMUNIZATION (OUTPATIENT)
Dept: INTERNAL MEDICINE | Facility: CLINIC | Age: 81
End: 2021-11-12
Payer: MEDICARE

## 2021-11-12 DIAGNOSIS — Z23 NEED FOR VACCINATION: Primary | ICD-10-CM

## 2021-11-12 PROCEDURE — 0004A COVID-19, MRNA, LNP-S, PF, 30 MCG/0.3 ML DOSE VACCINE: CPT | Mod: PBBFAC

## 2022-01-27 ENCOUNTER — OFFICE VISIT (OUTPATIENT)
Dept: CARDIOLOGY | Facility: CLINIC | Age: 82
End: 2022-01-27
Attending: INTERNAL MEDICINE
Payer: MEDICARE

## 2022-01-27 VITALS
SYSTOLIC BLOOD PRESSURE: 137 MMHG | WEIGHT: 123.44 LBS | HEART RATE: 67 BPM | BODY MASS INDEX: 21.07 KG/M2 | HEIGHT: 64 IN | DIASTOLIC BLOOD PRESSURE: 86 MMHG

## 2022-01-27 DIAGNOSIS — I50.32 HEART FAILURE, DIASTOLIC, CHRONIC: ICD-10-CM

## 2022-01-27 DIAGNOSIS — I34.0 NONRHEUMATIC MITRAL VALVE REGURGITATION: ICD-10-CM

## 2022-01-27 DIAGNOSIS — I10 PRIMARY HYPERTENSION: ICD-10-CM

## 2022-01-27 DIAGNOSIS — Z79.01 CHRONIC ANTICOAGULATION: ICD-10-CM

## 2022-01-27 DIAGNOSIS — I48.21 PERMANENT ATRIAL FIBRILLATION: ICD-10-CM

## 2022-01-27 DIAGNOSIS — I27.22 PULMONARY HYPERTENSION DUE TO LEFT HEART DISEASE: ICD-10-CM

## 2022-01-27 PROCEDURE — 99214 OFFICE O/P EST MOD 30 MIN: CPT | Mod: S$PBB,,, | Performed by: INTERNAL MEDICINE

## 2022-01-27 PROCEDURE — 99214 PR OFFICE/OUTPT VISIT, EST, LEVL IV, 30-39 MIN: ICD-10-PCS | Mod: S$PBB,,, | Performed by: INTERNAL MEDICINE

## 2022-01-27 PROCEDURE — 99213 OFFICE O/P EST LOW 20 MIN: CPT | Mod: PBBFAC | Performed by: INTERNAL MEDICINE

## 2022-01-27 PROCEDURE — 99999 PR PBB SHADOW E&M-EST. PATIENT-LVL III: ICD-10-PCS | Mod: PBBFAC,,, | Performed by: INTERNAL MEDICINE

## 2022-01-27 PROCEDURE — 99999 PR PBB SHADOW E&M-EST. PATIENT-LVL III: CPT | Mod: PBBFAC,,, | Performed by: INTERNAL MEDICINE

## 2022-01-27 NOTE — PROGRESS NOTES
Subjective:     Ellen Neves is a 81 y.o. female with hypertension. She has a healthy weight. On about 5/20/2020 she had nausea and vomited. She felt weak. At that time she also noted that her heart rate had become elevated. She mostly recorded ar heart rate 100-120 bpm. She continued to record an elevated heart rate over the coming weeks. On about 6/15/2020 she felt increasingly weak and began being short of breath. She noted her abdomen had become mildly distended. She saw Dr. Abran Terrazas on 6/18/2020 and was referred to the emergency room at [facility] for admission. She was in atrial fibrillation with a ventricular response rate of about 150 bpm on presentation. She denied any chest pain.  She received diltiazem iv and metoprolol po for rate control. Her rate was difficult to control. Digoxin was later added. On 6/19/2020 she had an Echo that revealed normal left ventricular size with low normal systolic function with ejection fraction of 50%. The left atrium was severely dilated as was the right atrium. There was moderate aortic valve sclerosis, mild to moderate aortic regurgitation and moderate mitral regurgitation. In addition there was moderate tricuspid regurgitation and the systolic pulmonary artery pressure was 56 mmHg. On 6/19/2020 she had a spell of agitation and confusion. She was released for follow up. In 9/2021 she was admitted to [facility] due to fast ventricular response rate even though she had been compliant with her metoprolol and digoxin. Diltiazem was added to her regimen and she was released. She denies any exertional chest pain or exertional dyspnea. No palpitations or weak spells. No bleeding. No issues with any of her prescribed medications. Feeling well overall.      Atrial Fibrillation  Presents for follow-up visit. Symptoms include hypertension and weakness. Symptoms are negative for bradycardia, chest pain, dizziness, hypotension,  palpitations, shortness of breath, syncope and tachycardia. The symptoms have been stable. Past medical history includes atrial fibrillation and CHF.   Congestive Heart Failure  Presents for follow-up visit. Pertinent negatives include no abdominal pain, chest pain, chest pressure, claudication, edema, fatigue, muscle weakness, near-syncope, nocturia, orthopnea, palpitations, paroxysmal nocturnal dyspnea, shortness of breath or unexpected weight change. The symptoms have been stable.   Hypertension  This is a chronic problem. The current episode started more than 1 year ago. The problem is unchanged. The problem is controlled (usually 110-120/70-80 mmHg at home). Pertinent negatives include no anxiety, blurred vision, chest pain, headaches, malaise/fatigue, neck pain, orthopnea, palpitations, peripheral edema, PND, shortness of breath or sweats.       Review of Systems   Constitutional: Negative for chills, fatigue, fever, malaise/fatigue and unexpected weight change.   HENT: Negative for nosebleeds.    Eyes: Positive for vision loss in left eye and vision loss in right eye. Negative for blurred vision and double vision.   Cardiovascular: Negative for chest pain, claudication, dyspnea on exertion, irregular heartbeat, leg swelling, near-syncope, orthopnea, palpitations, paroxysmal nocturnal dyspnea and syncope.   Respiratory: Negative for cough, hemoptysis, shortness of breath and wheezing.    Endocrine: Negative for cold intolerance and heat intolerance.   Hematologic/Lymphatic: Negative for bleeding problem. Does not bruise/bleed easily.   Skin: Negative for color change and rash.   Musculoskeletal: Negative for back pain, falls, muscle weakness, myalgias and neck pain.   Gastrointestinal: Negative for abdominal pain, heartburn, hematemesis, hematochezia, hemorrhoids, jaundice, melena, nausea and vomiting.   Genitourinary: Negative for dysuria, hematuria and nocturia.   Neurological: Positive for weakness.  Negative for dizziness, focal weakness, headaches, light-headedness, loss of balance, numbness and vertigo.   Psychiatric/Behavioral: Negative for altered mental status, depression and memory loss. The patient is not nervous/anxious.    Allergic/Immunologic: Negative for hives and persistent infections.       Current Outpatient Medications on File Prior to Visit   Medication Sig Dispense Refill    acetaminophen (TYLENOL) 500 MG tablet Take 2 tablets (1,000 mg total) by mouth every 8 (eight) hours as needed for Pain.  0    apixaban (ELIQUIS) 2.5 mg Tab Take 1 tablet (2.5 mg total) by mouth 2 (two) times daily. 180 tablet 3    citalopram (CELEXA) 20 MG tablet TAKE 1 TABLET DAILY 90 tablet 3    digoxin (LANOXIN) 250 mcg tablet Take 1 tablet (250 mcg total) by mouth once daily. 90 tablet 3    diltiaZEM (CARDIZEM CD) 180 MG 24 hr capsule Take 1 capsule (180 mg total) by mouth once daily. 90 capsule 3    furosemide (LASIX) 20 MG tablet Take 1 tablet (20 mg total) by mouth once daily. 90 tablet 3    lisinopriL (PRINIVIL,ZESTRIL) 40 MG tablet Take 1 tablet (40 mg total) by mouth once daily. 90 tablet 3    metoprolol succinate (TOPROL-XL) 100 MG 24 hr tablet Take 1 tablet (100 mg total) by mouth 2 (two) times daily. 180 tablet 3    spironolactone (ALDACTONE) 25 MG tablet Take 1 tablet (25 mg total) by mouth once daily. 90 tablet 3    temazepam (RESTORIL) 15 mg Cap Take 1 capsule (15 mg total) by mouth nightly as needed. 30 capsule 3    traMADoL (ULTRAM) 50 mg tablet TAKE 1 TABLET BY MOUTH EVERY 6-8 HOURS AS NEEDED FOR PAIN More than 7 day quantity medically necessary 100 tablet 0    vit C,T-Je-jsgwi-lutein-zeaxan (PRESERVISION AREDS 2) 611-761-69-1 mg-unit-mg-mg Cap Take 1 each by mouth 2 (two) times a day.      oxyCODONE-acetaminophen (PERCOCET) 7.5-325 mg per tablet TK 1 T PO Q 6 H PRN P       No current facility-administered medications on file prior to visit.       /86 (BP Location: Right arm, Patient  "Position: Sitting)   Pulse 67   Ht 5' 4" (1.626 m)   Wt 56 kg (123 lb 7.3 oz)   BMI 21.19 kg/m²       Objective:     Physical Exam  Constitutional:       General: She is not in acute distress.     Appearance: Normal appearance. She is well-developed. She is not toxic-appearing or diaphoretic.   HENT:      Head: Normocephalic and atraumatic.      Nose: Nose normal.   Eyes:      General:         Right eye: No discharge.         Left eye: No discharge.      Conjunctiva/sclera:      Right eye: Right conjunctiva is not injected.      Left eye: Left conjunctiva is not injected.      Pupils: Pupils are equal.      Right eye: Pupil is round.      Left eye: Pupil is round.   Neck:      Thyroid: No thyromegaly.      Vascular: No carotid bruit or JVD.   Cardiovascular:      Rate and Rhythm: Normal rate. Rhythm irregularly irregular.  No extrasystoles are present.     Chest Wall: PMI is not displaced.      Pulses:           Radial pulses are 2+ on the right side and 2+ on the left side.        Femoral pulses are 2+ on the right side and 2+ on the left side.       Dorsalis pedis pulses are 2+ on the right side and 2+ on the left side.        Posterior tibial pulses are 2+ on the right side and 2+ on the left side.      Heart sounds: S1 normal and S2 normal. Murmur heard.   High-pitched blowing holosystolic murmur is present with a grade of 2/6 at the apex.  No gallop.    Pulmonary:      Effort: Pulmonary effort is normal.      Breath sounds: Normal breath sounds.   Abdominal:      Palpations: Abdomen is soft.      Tenderness: There is no abdominal tenderness.   Musculoskeletal:      Cervical back: Neck supple.      Right ankle: No swelling, deformity or ecchymosis.      Left ankle: No swelling, deformity or ecchymosis.   Lymphadenopathy:      Head:      Right side of head: No submandibular adenopathy.      Left side of head: No submandibular adenopathy.      Cervical: No cervical adenopathy.   Skin:     General: Skin is warm " and dry.   Neurological:      Mental Status: She is alert and oriented to person, place, and time. She is not disoriented.      Cranial Nerves: No cranial nerve deficit.   Psychiatric:         Speech: Speech normal.         Behavior: Behavior normal.         Thought Content: Thought content normal.         Judgment: Judgment normal.         Assessment:     1. Permanent atrial fibrillation    2. Chronic anticoagulation    3. Heart failure, diastolic, chronic    4. Nonrheumatic mitral valve regurgitation    5. Pulmonary hypertension due to left heart disease    6. Primary hypertension        Plan:     1. Atrial Fibrillation              5/20/2020: Clinical onset of persistent atrial fibrillation.              6/18/2020: Presents with fast VRR. Received diltiazem iv.               [id number] (CHA2Sc).              6/19/2020: Began digoxin.              On metoprolol 100 mg Q12 and digoxin 0.125 mg Q24.              Anticoagulation with apixiban.              6/21/2020: VRR on high side.              6/21/2020: Could possibly consider RAYSA guided CV in the future but likelihood on long term success with rhythm control strategy would be low. Long discussion with patient about options. 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 We decided on a rate control strategy at least for now which I agree is very reasonable.              6/22/2020: Digoxin 1.0.   7/28/2020: Holter: Atrial fibrillation 94 () bpm. No pauses. 8 VPC's. No symptoms.              On metoprolol 100 mg Q12 and digoxin 0.25 mg Q24.   9/2021: [place], [place]: Admitted with fast VRR. Diltiazem 180 mg Q24 was begun.   10/22/2021: Metoprolol tartrate 100 mg Q12 was changed to metoprolol succinate 100 mg Q12.     On metoprolol 100 mg Q12, diltiazem 180 mg Q24 and digoxin 0.25 mg Q24.              Rate appears well controlled.                 2. Chronic  Anticoagulation              [id number]=2 (CHA2Sc).              Anticoagulation with apixiban.              On apixiban 2.5 mg Q12.              No aspirin or NSAID.     3. Heart Failure, Diastolic, Chronic              6/19/2020: Echo: Normal left ventricular size with low normal systolic function. EF 50%. Severely dilated LA. Severely dilated RA. Moderate aortic valve sclerosis. Mild to moderate AR. Moderate MR. Moderate TR. SPAP 56 mmHg.               6/18/2020: Presented in HF. Received furosemide 20 mg iv Q12.              6/22/2020: .              On metoprolol 100 mg Q12, diltiazem 180 mg Q24, lisinopril 40 mg Q24, spironolactone 25 mg Q24 and furosemide 20 mg Q24.              Appears well compesated     4. Mitral Regurgitation              6/19/2020: Echo: Moderate MR.     5. Pulmonary Hypertension              6/19/2020: Echo: SPAP 56 mmHg.               6. Hypertension   2010: Diagnosed.              Used to be on nadolol 40 mg Q12 and lisinopril 40 mg Q24.              On metoprolol 100 mg Q12, diltiazem 180 mg Q24, lisinopril 40 mg Q24, spironolactone 25 mg Q24 and furosemide 20 mg Q24.   Keeping log at home.   Well controlled.     7. History of Hyponatremia              6/20/2020: Na 128.              Fluid restriction.              6/22/2020: Na 135.              Improving.      8. Migraine               Occasional tramadol.     9. History of Confusion              6/19/2020: Agitation and confusion.              Resolved.     10. Primary Care              Dr. Abran Terrazas.     F/u 4 months.    Donnell Mckeon M.D.

## 2022-02-09 ENCOUNTER — PATIENT OUTREACH (OUTPATIENT)
Dept: ADMINISTRATIVE | Facility: HOSPITAL | Age: 82
End: 2022-02-09
Payer: MEDICARE

## 2022-02-09 NOTE — PROGRESS NOTES
[facility] chart audits-DEPRESSION SCREEN/FOLLOW UP PLAN.    SCREENING:  OV 10.21.2021

## 2022-06-13 ENCOUNTER — HOSPITAL ENCOUNTER (OUTPATIENT)
Dept: RADIOLOGY | Facility: HOSPITAL | Age: 82
Discharge: HOME | End: 2022-06-13
Attending: INTERNAL MEDICINE
Payer: MEDICARE

## 2022-06-13 VITALS — WEIGHT: 123 LBS | BODY MASS INDEX: 21 KG/M2 | HEIGHT: 64 IN

## 2022-06-13 DIAGNOSIS — Z12.31 ENCOUNTER FOR SCREENING MAMMOGRAM FOR MALIGNANT NEOPLASM OF BREAST: ICD-10-CM

## 2022-06-13 PROCEDURE — 77063 BREAST TOMOSYNTHESIS BI: CPT | Mod: 26,,, | Performed by: RADIOLOGY

## 2022-06-13 PROCEDURE — 77067 MAMMO DIGITAL SCREENING BILAT WITH TOMO: ICD-10-PCS | Mod: 26,,, | Performed by: RADIOLOGY

## 2022-06-13 PROCEDURE — 77067 SCR MAMMO BI INCL CAD: CPT | Mod: TC

## 2022-06-13 PROCEDURE — 77067 SCR MAMMO BI INCL CAD: CPT | Mod: 26,,, | Performed by: RADIOLOGY

## 2022-06-13 PROCEDURE — 77063 MAMMO DIGITAL SCREENING BILAT WITH TOMO: ICD-10-PCS | Mod: 26,,, | Performed by: RADIOLOGY

## 2022-07-12 ENCOUNTER — OFFICE VISIT (OUTPATIENT)
Dept: CARDIOLOGY | Facility: CLINIC | Age: 82
End: 2022-07-12
Attending: INTERNAL MEDICINE
Payer: MEDICARE

## 2022-07-12 VITALS
HEIGHT: 64 IN | WEIGHT: 123 LBS | OXYGEN SATURATION: 97 % | BODY MASS INDEX: 21 KG/M2 | HEART RATE: 78 BPM | DIASTOLIC BLOOD PRESSURE: 64 MMHG | SYSTOLIC BLOOD PRESSURE: 117 MMHG

## 2022-07-12 DIAGNOSIS — I50.32 HEART FAILURE, DIASTOLIC, CHRONIC: ICD-10-CM

## 2022-07-12 DIAGNOSIS — I34.0 NONRHEUMATIC MITRAL VALVE REGURGITATION: ICD-10-CM

## 2022-07-12 DIAGNOSIS — I27.22 PULMONARY HYPERTENSION DUE TO LEFT HEART DISEASE: ICD-10-CM

## 2022-07-12 DIAGNOSIS — I10 PRIMARY HYPERTENSION: ICD-10-CM

## 2022-07-12 DIAGNOSIS — Z79.01 CHRONIC ANTICOAGULATION: ICD-10-CM

## 2022-07-12 DIAGNOSIS — I48.21 PERMANENT ATRIAL FIBRILLATION: ICD-10-CM

## 2022-07-12 DIAGNOSIS — G43.009 MIGRAINE WITHOUT AURA AND WITHOUT STATUS MIGRAINOSUS, NOT INTRACTABLE: ICD-10-CM

## 2022-07-12 PROCEDURE — 99214 OFFICE O/P EST MOD 30 MIN: CPT | Mod: S$PBB,,, | Performed by: INTERNAL MEDICINE

## 2022-07-12 PROCEDURE — 99213 OFFICE O/P EST LOW 20 MIN: CPT | Mod: PBBFAC | Performed by: INTERNAL MEDICINE

## 2022-07-12 PROCEDURE — 99999 PR PBB SHADOW E&M-EST. PATIENT-LVL III: ICD-10-PCS | Mod: PBBFAC,,, | Performed by: INTERNAL MEDICINE

## 2022-07-12 PROCEDURE — 99214 PR OFFICE/OUTPT VISIT, EST, LEVL IV, 30-39 MIN: ICD-10-PCS | Mod: S$PBB,,, | Performed by: INTERNAL MEDICINE

## 2022-07-12 PROCEDURE — 99999 PR PBB SHADOW E&M-EST. PATIENT-LVL III: CPT | Mod: PBBFAC,,, | Performed by: INTERNAL MEDICINE

## 2022-07-12 NOTE — PROGRESS NOTES
Subjective:     Ellen Neves is a 82 y.o. female with hypertension. She has a healthy weight. On about 5/20/2020 she had nausea and vomited. She felt weak. At that time she also noted that her heart rate had become elevated. She mostly recorded ar heart rate 100-120 bpm. She continued to record an elevated heart rate over the coming weeks. On about 6/15/2020 she felt increasingly weak and began being short of breath. She noted her abdomen had become mildly distended. She saw Dr. Abran Terrazas on 6/18/2020 and was referred to the emergency room at [facility] for admission. She was in atrial fibrillation with a ventricular response rate of about 150 bpm on presentation. She denied any chest pain.  She received diltiazem intravenously and metoprolol orally for rate control. Her rate was difficult to control. Digoxin was later added. On 6/19/2020 she had an echocardiogram that revealed normal left ventricular size with low normal systolic function with ejection fraction of 50%. The left atrium was severely dilated as was the right atrium. There was moderate aortic valve sclerosis, mild to moderate aortic regurgitation and moderate mitral regurgitation. In addition there was moderate tricuspid regurgitation and the systolic pulmonary artery pressure was 56 mmHg. On 6/19/2020 she had a spell of agitation and confusion. She was released for follow up. In 9/2021 she was admitted to [facility] due to fast ventricular response rate even though she had been compliant with her metoprolol and digoxin. Diltiazem was added to her regimen and she was released. She denies any exertional chest pain or exertional dyspnea. No palpitations or weak spells. No bleeding. No issues with any of her prescribed medications. Feeling well overall.      Atrial Fibrillation  Presents for follow-up visit. Symptoms include hypertension and weakness. Symptoms are negative for bradycardia, chest pain,  dizziness, hypotension, palpitations, shortness of breath, syncope and tachycardia. The symptoms have been stable. Past medical history includes atrial fibrillation and CHF.   Congestive Heart Failure  Presents for follow-up visit. Pertinent negatives include no abdominal pain, chest pain, chest pressure, claudication, edema, fatigue, muscle weakness, near-syncope, nocturia, orthopnea, palpitations, paroxysmal nocturnal dyspnea, shortness of breath or unexpected weight change. The symptoms have been stable.   Hypertension  This is a chronic problem. The current episode started more than 1 year ago. The problem is unchanged. The problem is controlled (usually 110-120/70-80 mmHg at home). Pertinent negatives include no anxiety, blurred vision, chest pain, headaches, malaise/fatigue, neck pain, orthopnea, palpitations, peripheral edema, PND, shortness of breath or sweats.       Review of Systems   Constitutional: Negative for chills, fatigue, fever, malaise/fatigue and unexpected weight change.   HENT: Negative for nosebleeds.    Eyes: Positive for vision loss in left eye and vision loss in right eye. Negative for blurred vision and double vision.   Cardiovascular: Negative for chest pain, claudication, dyspnea on exertion, irregular heartbeat, leg swelling, near-syncope, orthopnea, palpitations, paroxysmal nocturnal dyspnea and syncope.   Respiratory: Negative for cough, hemoptysis, shortness of breath and wheezing.    Endocrine: Negative for cold intolerance and heat intolerance.   Hematologic/Lymphatic: Negative for bleeding problem. Does not bruise/bleed easily.   Skin: Negative for color change and rash.   Musculoskeletal: Negative for back pain, falls, muscle weakness, myalgias and neck pain.   Gastrointestinal: Negative for abdominal pain, heartburn, hematemesis, hematochezia, hemorrhoids, jaundice, melena, nausea and vomiting.   Genitourinary: Negative for dysuria, hematuria and nocturia.   Neurological:  "Positive for weakness. Negative for dizziness, focal weakness, headaches, light-headedness, loss of balance, numbness and vertigo.   Psychiatric/Behavioral: Negative for altered mental status, depression and memory loss. The patient is not nervous/anxious.    Allergic/Immunologic: Negative for hives and persistent infections.       Current Outpatient Medications on File Prior to Visit   Medication Sig Dispense Refill    acetaminophen (TYLENOL) 500 MG tablet Take 2 tablets (1,000 mg total) by mouth every 8 (eight) hours as needed for Pain.  0    citalopram (CELEXA) 20 MG tablet TAKE 1 TABLET DAILY 90 tablet 3    digoxin (LANOXIN) 250 mcg tablet TAKE 1 TABLET DAILY 90 tablet 3    diltiaZEM (CARDIZEM CD) 180 MG 24 hr capsule Take 1 capsule (180 mg total) by mouth once daily. 90 capsule 3    ELIQUIS 2.5 mg Tab TAKE 1 TABLET TWICE A  tablet 3    furosemide (LASIX) 20 MG tablet TAKE 1 TABLET DAILY 90 tablet 3    lisinopriL (PRINIVIL,ZESTRIL) 40 MG tablet TAKE 1 TABLET DAILY 90 tablet 3    metoprolol succinate (TOPROL-XL) 100 MG 24 hr tablet Take 1 tablet (100 mg total) by mouth 2 (two) times daily. 180 tablet 3    spironolactone (ALDACTONE) 25 MG tablet TAKE 1 TABLET DAILY 90 tablet 3    temazepam (RESTORIL) 15 mg Cap Take 1 capsule (15 mg total) by mouth nightly as needed. 30 capsule 3    traMADoL (ULTRAM) 50 mg tablet TAKE 1 TABLET BY MOUTH EVERY 6-8 HOURS AS NEEDED FOR PAIN More than 7 day quantity medically necessary 100 tablet 0    vit C,V-Wd-vuski-lutein-zeaxan (PRESERVISION AREDS 2) 771-861-43-1 mg-unit-mg-mg Cap Take 1 each by mouth 2 (two) times a day.      oxyCODONE-acetaminophen (PERCOCET) 7.5-325 mg per tablet TK 1 T PO Q 6 H PRN P       No current facility-administered medications on file prior to visit.       /64 (BP Location: Left arm, Patient Position: Sitting, BP Method: Large (Manual))   Pulse 78   Ht 5' 4" (1.626 m)   Wt 55.8 kg (123 lb)   SpO2 97%   BMI 21.11 kg/m²       Objective:   "   Physical Exam  Constitutional:       General: She is not in acute distress.     Appearance: Normal appearance. She is well-developed. She is not toxic-appearing or diaphoretic.   HENT:      Head: Normocephalic and atraumatic.      Nose: Nose normal.   Eyes:      General:         Right eye: No discharge.         Left eye: No discharge.      Conjunctiva/sclera:      Right eye: Right conjunctiva is not injected.      Left eye: Left conjunctiva is not injected.      Pupils: Pupils are equal.      Right eye: Pupil is round.      Left eye: Pupil is round.   Neck:      Thyroid: No thyromegaly.      Vascular: No carotid bruit or JVD.   Cardiovascular:      Rate and Rhythm: Normal rate. Rhythm irregularly irregular.  No extrasystoles are present.     Chest Wall: PMI is not displaced.      Pulses:           Radial pulses are 2+ on the right side and 2+ on the left side.        Femoral pulses are 2+ on the right side and 2+ on the left side.       Dorsalis pedis pulses are 2+ on the right side and 2+ on the left side.        Posterior tibial pulses are 2+ on the right side and 2+ on the left side.      Heart sounds: S1 normal and S2 normal. Murmur heard.   High-pitched blowing holosystolic murmur is present with a grade of 2/6 at the apex.    No gallop.   Pulmonary:      Effort: Pulmonary effort is normal.      Breath sounds: Normal breath sounds.   Abdominal:      Palpations: Abdomen is soft.      Tenderness: There is no abdominal tenderness.   Musculoskeletal:      Cervical back: Neck supple.      Right ankle: No swelling, deformity or ecchymosis.      Left ankle: No swelling, deformity or ecchymosis.   Lymphadenopathy:      Head:      Right side of head: No submandibular adenopathy.      Left side of head: No submandibular adenopathy.      Cervical: No cervical adenopathy.   Skin:     General: Skin is warm and dry.   Neurological:      General: No focal deficit present.      Mental Status: She is alert and oriented to  person, place, and time. She is not disoriented.      Cranial Nerves: No cranial nerve deficit.   Psychiatric:         Attention and Perception: Attention and perception normal.         Mood and Affect: Mood and affect normal.         Speech: Speech normal.         Behavior: Behavior normal.         Thought Content: Thought content normal.         Cognition and Memory: Cognition and memory normal.         Judgment: Judgment normal.         Assessment:     1. Permanent atrial fibrillation    2. Chronic anticoagulation    3. Heart failure, diastolic, chronic    4. Nonrheumatic mitral valve regurgitation    5. Pulmonary hypertension due to left heart disease    6. Primary hypertension    7. Migraine without aura and without status migrainosus, not intractable        Plan:     1. Atrial Fibrillation              5/20/2020: Clinical onset of persistent atrial fibrillation.              6/18/2020: Presents with fast VRR. Received diltiazem iv.               [id number] (CHA2Sc).              6/19/2020: Began digoxin.              On metoprolol 100 mg Q12 and digoxin 0.125 mg Q24.              Anticoagulation with apixiban.              6/21/2020: VRR on high side.              6/21/2020: Could possibly consider RAYSA guided CV in the future but likelihood on long term success with rhythm control strategy would be low. Long discussion with patient about options. 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 We decided on a rate control strategy at least for now which I agree is very reasonable.              6/22/2020: Digoxin 1.0.   7/28/2020: Holter: Atrial fibrillation 94 () bpm. No pauses. 8 VPC's. No symptoms.              On metoprolol 100 mg Q12 and digoxin 0.25 mg Q24.   9/2021: [place], [place]: Admitted with fast VRR. Diltiazem 180 mg Q24 was begun.   10/22/2021: Metoprolol tartrate 100 mg Q12 was changed to metoprolol  succinate 100 mg Q12.     On metoprolol 100 mg Q12, diltiazem 180 mg Q24 and digoxin 0.25 mg Q24.   Needing 3 drugs for control of rate.              Rate appears well controlled.   8/2022: Do Holter x 48 hours.                 2. Chronic Anticoagulation              [id number]=0 (CHA2Sc).              Anticoagulation with apixiban.              On apixiban 2.5 mg Q12.              No aspirin or NSAID.     3. Heart Failure, Diastolic, Chronic              6/19/2020: Echo: Normal left ventricular size with low normal systolic function. EF 50%. Severely dilated LA. Severely dilated RA. Moderate aortic valve sclerosis. Mild to moderate AR. Moderate MR. Moderate TR. SPAP 56 mmHg.               6/18/2020: Presented in HF. Received furosemide 20 mg iv Q12.              6/22/2020: .              On metoprolol 100 mg Q12, diltiazem 180 mg Q24, lisinopril 40 mg Q24, spironolactone 25 mg Q24 and furosemide 20 mg Q24.   A SGLT2i would be a reasonable addition.              Appears well compesated     4. Mitral Regurgitation              6/19/2020: Echo: Moderate MR.     5. Pulmonary Hypertension              6/19/2020: Echo: SPAP 56 mmHg.               6. Hypertension   2010: Diagnosed.              Used to be on nadolol 40 mg Q12 and lisinopril 40 mg Q24.              On metoprolol 100 mg Q12, diltiazem 180 mg Q24, lisinopril 40 mg Q24, spironolactone 25 mg Q24 and furosemide 20 mg Q24.   Keeping log at home.   Well controlled.     7. History of Hyponatremia              6/20/2020: Na 128.              Fluid restriction.              6/22/2020: Na 135.              Improving.      8. Migraine               Occasional tramadol.     9. History of Confusion              6/19/2020: Agitation and confusion.              Resolved.     10. Primary Care              Dr. Abran Terrazas.     F/u 4 months.    Donnell Mckeon M.D.        10/5/2022 3:59 PM, Addendum:    10/5/2022: Holter: Atrial fibrillation 80 () bpm. No  prolonged pauses. Narrow QRS. Rare VPCs - <1/hr. No reported symptoms.    VRR well controlled.    I discussed the above test result and the implications of the findings over the phone.    Donnell Mckeon M.D.

## 2022-09-15 ENCOUNTER — HOSPITAL ENCOUNTER (OUTPATIENT)
Dept: CARDIOLOGY | Facility: OTHER | Age: 82
Discharge: HOME | End: 2022-09-15
Attending: INTERNAL MEDICINE
Payer: MEDICARE

## 2022-09-15 DIAGNOSIS — I48.21 PERMANENT ATRIAL FIBRILLATION: ICD-10-CM

## 2022-09-28 ENCOUNTER — HOSPITAL ENCOUNTER (OUTPATIENT)
Dept: CARDIOLOGY | Facility: OTHER | Age: 82
Discharge: HOME | End: 2022-09-28
Attending: INTERNAL MEDICINE
Payer: MEDICARE

## 2022-09-28 PROCEDURE — 93226 XTRNL ECG REC<48 HR SCAN A/R: CPT

## 2022-09-28 PROCEDURE — 93227 HOLTER MONITOR - 48 HOUR (CUPID ONLY): ICD-10-PCS | Mod: ,,, | Performed by: INTERNAL MEDICINE

## 2022-09-28 PROCEDURE — 93227 XTRNL ECG REC<48 HR R&I: CPT | Mod: ,,, | Performed by: INTERNAL MEDICINE

## 2022-10-05 LAB
OHS CV EVENT MONITOR DAY: 0
OHS CV HOLTER LENGTH DECIMAL HOURS: 48
OHS CV HOLTER LENGTH HOURS: 48
OHS CV HOLTER LENGTH MINUTES: 0

## 2022-12-02 ENCOUNTER — OFFICE VISIT (OUTPATIENT)
Dept: CARDIOLOGY | Facility: CLINIC | Age: 82
End: 2022-12-02
Attending: INTERNAL MEDICINE
Payer: MEDICARE

## 2022-12-02 VITALS
BODY MASS INDEX: 21.07 KG/M2 | OXYGEN SATURATION: 98 % | HEART RATE: 79 BPM | WEIGHT: 123.44 LBS | DIASTOLIC BLOOD PRESSURE: 70 MMHG | SYSTOLIC BLOOD PRESSURE: 120 MMHG | HEIGHT: 64 IN

## 2022-12-02 DIAGNOSIS — Z79.01 CHRONIC ANTICOAGULATION: ICD-10-CM

## 2022-12-02 DIAGNOSIS — I50.32 HEART FAILURE, DIASTOLIC, CHRONIC: ICD-10-CM

## 2022-12-02 DIAGNOSIS — I34.0 NONRHEUMATIC MITRAL VALVE REGURGITATION: ICD-10-CM

## 2022-12-02 DIAGNOSIS — I48.21 PERMANENT ATRIAL FIBRILLATION: ICD-10-CM

## 2022-12-02 DIAGNOSIS — I27.22 PULMONARY HYPERTENSION DUE TO LEFT HEART DISEASE: ICD-10-CM

## 2022-12-02 DIAGNOSIS — I10 PRIMARY HYPERTENSION: ICD-10-CM

## 2022-12-02 PROCEDURE — 93010 PR ELECTROCARDIOGRAM REPORT: ICD-10-PCS | Mod: S$PBB,,, | Performed by: INTERNAL MEDICINE

## 2022-12-02 PROCEDURE — 99214 OFFICE O/P EST MOD 30 MIN: CPT | Mod: PBBFAC | Performed by: INTERNAL MEDICINE

## 2022-12-02 PROCEDURE — 99999 PR PBB SHADOW E&M-EST. PATIENT-LVL IV: ICD-10-PCS | Mod: PBBFAC,,, | Performed by: INTERNAL MEDICINE

## 2022-12-02 PROCEDURE — 93005 ELECTROCARDIOGRAM TRACING: CPT | Mod: PBBFAC | Performed by: INTERNAL MEDICINE

## 2022-12-02 PROCEDURE — 99214 OFFICE O/P EST MOD 30 MIN: CPT | Mod: S$PBB,25,, | Performed by: INTERNAL MEDICINE

## 2022-12-02 PROCEDURE — 93005 ELECTROCARDIOGRAM TRACING: CPT

## 2022-12-02 PROCEDURE — 99214 PR OFFICE/OUTPT VISIT, EST, LEVL IV, 30-39 MIN: ICD-10-PCS | Mod: S$PBB,25,, | Performed by: INTERNAL MEDICINE

## 2022-12-02 PROCEDURE — 93010 ELECTROCARDIOGRAM REPORT: CPT | Mod: S$PBB,,, | Performed by: INTERNAL MEDICINE

## 2022-12-02 PROCEDURE — 99999 PR PBB SHADOW E&M-EST. PATIENT-LVL IV: CPT | Mod: PBBFAC,,, | Performed by: INTERNAL MEDICINE

## 2022-12-02 NOTE — PROGRESS NOTES
Subjective:     Ellen Neves is a 82 y.o. female with hypertension. She has a healthy weight. On about 5/20/2020 she had nausea and vomited. She felt weak. At that time she also noted that her heart rate had become elevated. She mostly recorded ar heart rate 100-120 bpm. She continued to record an elevated heart rate over the coming weeks. On about 6/15/2020 she felt increasingly weak and began being short of breath. She noted her abdomen had become mildly distended. She saw Dr. Abran Terrazas on 6/18/2020 and was referred to the emergency room at [facility] for admission. She was in atrial fibrillation with a ventricular response rate of about 150 bpm on presentation. She denied any chest pain. She received diltiazem intravenously and metoprolol orally for rate control. Her rate was difficult to control. Digoxin was later added. On 6/19/2020 she had an echocardiogram that revealed normal left ventricular size with low normal systolic function with ejection fraction of 50%. The left atrium was severely dilated as was the right atrium. There was moderate aortic valve sclerosis, mild to moderate aortic regurgitation and moderate mitral regurgitation. In addition there was moderate tricuspid regurgitation and the systolic pulmonary artery pressure was 56 mmHg. On 6/19/2020 she had a spell of agitation and confusion. She was released for follow up. In 9/2021 she was admitted to [facility] due to fast ventricular response rate even though she had been compliant with her metoprolol and digoxin. Diltiazem was added to her regimen and she was released. On 10/5/2022 she had a holter that revealed atrial fibrillation 80 () bpm. There were no prolonged pauses. The QRS complexes were narrow. There were rare ventricular premature contractions of less than 1/hr. No reported symptoms. She denies any exertional chest pain or exertional dyspnea. No palpitations or weak spells. No  bleeding. No issues with any of her prescribed medications. Feeling well overall.      Atrial Fibrillation  Presents for follow-up visit. Symptoms include hypertension and weakness. Symptoms are negative for bradycardia, chest pain, dizziness, hypotension, palpitations, shortness of breath, syncope and tachycardia. The symptoms have been stable. Past medical history includes CHF.   Congestive Heart Failure  Presents for follow-up visit. Pertinent negatives include no abdominal pain, chest pain, chest pressure, claudication, edema, fatigue, muscle weakness, near-syncope, nocturia, orthopnea, palpitations, paroxysmal nocturnal dyspnea, shortness of breath or unexpected weight change. The symptoms have been stable.   Hypertension  This is a chronic problem. The current episode started more than 1 year ago. The problem is unchanged. The problem is controlled (usually 110-120/70-80 mmHg at home). Pertinent negatives include no anxiety, blurred vision, chest pain, headaches, malaise/fatigue, neck pain, orthopnea, palpitations, peripheral edema, PND, shortness of breath or sweats.     Review of Systems   Constitutional: Negative for chills, fatigue, fever, malaise/fatigue and unexpected weight change.   HENT:  Negative for nosebleeds.    Eyes:  Positive for vision loss in left eye and vision loss in right eye. Negative for blurred vision and double vision.   Cardiovascular:  Negative for chest pain, claudication, dyspnea on exertion, irregular heartbeat, leg swelling, near-syncope, orthopnea, palpitations, paroxysmal nocturnal dyspnea and syncope.   Respiratory:  Negative for cough, hemoptysis, shortness of breath and wheezing.    Endocrine: Negative for cold intolerance and heat intolerance.   Hematologic/Lymphatic: Negative for bleeding problem. Does not bruise/bleed easily.   Skin:  Negative for color change and rash.   Musculoskeletal:  Negative for back pain, falls, muscle weakness, myalgias and neck pain.    Gastrointestinal:  Negative for abdominal pain, heartburn, hematemesis, hematochezia, hemorrhoids, jaundice, melena, nausea and vomiting.   Genitourinary:  Negative for dysuria, hematuria and nocturia.   Neurological:  Positive for weakness. Negative for dizziness, focal weakness, headaches, light-headedness, loss of balance, numbness and vertigo.   Psychiatric/Behavioral:  Negative for altered mental status, depression and memory loss. The patient is not nervous/anxious.    Allergic/Immunologic: Negative for hives and persistent infections.       Current Outpatient Medications on File Prior to Visit   Medication Sig Dispense Refill    acetaminophen (TYLENOL) 500 MG tablet Take 2 tablets (1,000 mg total) by mouth every 8 (eight) hours as needed for Pain.  0    citalopram (CELEXA) 20 MG tablet TAKE 1 TABLET DAILY 90 tablet 3    digoxin (LANOXIN) 250 mcg tablet TAKE 1 TABLET DAILY 90 tablet 3    diltiaZEM (CARDIZEM CD) 180 MG 24 hr capsule Take 1 capsule (180 mg total) by mouth once daily. 90 capsule 3    ELIQUIS 2.5 mg Tab TAKE 1 TABLET TWICE A  tablet 3    furosemide (LASIX) 20 MG tablet TAKE 1 TABLET DAILY 90 tablet 3    lisinopriL (PRINIVIL,ZESTRIL) 40 MG tablet TAKE 1 TABLET DAILY 90 tablet 3    metoprolol succinate (TOPROL-XL) 100 MG 24 hr tablet Take 1 tablet (100 mg total) by mouth 2 (two) times daily. 180 tablet 3    ondansetron (ZOFRAN-ODT) 8 MG TbDL DISSOLVE ONE TABLET BY MOUTH EVERY 6 TO 8 HOURS AS NEEDED FOR NAUSEA      spironolactone (ALDACTONE) 25 MG tablet TAKE 1 TABLET DAILY 90 tablet 3    traMADoL (ULTRAM) 50 mg tablet TAKE 1 TABLET BY MOUTH EVERY 6-8 HOURS AS NEEDED FOR PAIN More than 7 day quantity medically necessary 100 tablet 0    vit C,P-Ww-rhhbu-lutein-zeaxan (PRESERVISION AREDS 2) [id number] mg-unit-mg-mg Cap Take 1 each by mouth 2 (two) times a day.      diltiaZEM (CARDIZEM CD) 180 MG 24 hr capsule TAKE 1 CAPSULE DAILY 90 capsule 3    metoprolol succinate (TOPROL-XL) 100 MG 24 hr  "tablet TAKE 1 TABLET TWICE A  tablet 3    oxyCODONE-acetaminophen (PERCOCET) 7.5-325 mg per tablet TK 1 T PO Q 6 H PRN P      [DISCONTINUED] temazepam (RESTORIL) 15 mg Cap Take 1 capsule (15 mg total) by mouth nightly as needed. 30 capsule 3     No current facility-administered medications on file prior to visit.       /70 (BP Location: Right arm, Patient Position: Sitting)   Pulse 79   Ht 5' 4" (1.626 m)   Wt 56 kg (123 lb 7.3 oz)   SpO2 98%   BMI 21.19 kg/m²       Objective:     Physical Exam  Constitutional:       General: She is not in acute distress.     Appearance: Normal appearance. She is well-developed. She is not toxic-appearing or diaphoretic.   HENT:      Head: Normocephalic and atraumatic.      Nose: Nose normal.   Eyes:      General:         Right eye: No discharge.         Left eye: No discharge.      Conjunctiva/sclera:      Right eye: Right conjunctiva is not injected.      Left eye: Left conjunctiva is not injected.      Pupils: Pupils are equal.      Right eye: Pupil is round.      Left eye: Pupil is round.   Neck:      Thyroid: No thyromegaly.      Vascular: No carotid bruit or JVD.   Cardiovascular:      Rate and Rhythm: Normal rate. Rhythm irregularly irregular. No extrasystoles are present.     Chest Wall: PMI is not displaced.      Pulses:           Radial pulses are 2+ on the right side and 2+ on the left side.        Femoral pulses are 2+ on the right side and 2+ on the left side.       Dorsalis pedis pulses are 2+ on the right side and 2+ on the left side.        Posterior tibial pulses are 2+ on the right side and 2+ on the left side.      Heart sounds: S1 normal and S2 normal. Murmur heard.   High-pitched blowing holosystolic murmur is present with a grade of 2/6 at the apex.     No gallop.   Pulmonary:      Effort: Pulmonary effort is normal.      Breath sounds: Normal breath sounds.   Abdominal:      Palpations: Abdomen is soft.      Tenderness: There is no abdominal " tenderness.   Musculoskeletal:      Cervical back: Neck supple.      Right ankle: No swelling, deformity or ecchymosis.      Left ankle: No swelling, deformity or ecchymosis.   Lymphadenopathy:      Head:      Right side of head: No submandibular adenopathy.      Left side of head: No submandibular adenopathy.      Cervical: No cervical adenopathy.   Skin:     General: Skin is warm and dry.   Neurological:      General: No focal deficit present.      Mental Status: She is alert and oriented to person, place, and time. She is not disoriented.      Cranial Nerves: No cranial nerve deficit.   Psychiatric:         Attention and Perception: Attention and perception normal.         Mood and Affect: Mood and affect normal.         Speech: Speech normal.         Behavior: Behavior normal.         Thought Content: Thought content normal.         Cognition and Memory: Cognition and memory normal.         Judgment: Judgment normal.       Assessment:     1. Permanent atrial fibrillation    2. Chronic anticoagulation    3. Heart failure, diastolic, chronic    4. Nonrheumatic mitral valve regurgitation    5. Pulmonary hypertension due to left heart disease    6. Primary hypertension        Plan:     1. Atrial Fibrillation              5/20/2020: Clinical onset of persistent atrial fibrillation.              6/18/2020: Presents with fast VRR. Received diltiazem iv.               [id number]=0 (CHA2Sc).              6/19/2020: Began digoxin.              On metoprolol 100 mg Q12 and digoxin 0.125 mg Q24.              Anticoagulation with apixiban.              6/21/2020: VRR on high side.              6/21/2020: Could possibly consider RAYSA guided CV in the future but likelihood on long term success with rhythm control strategy would be low. Long discussion with patient about options. 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 We decided on a rate control  strategy at least for now which I agree is very reasonable.              6/22/2020: Digoxin 1.0.   7/28/2020: Holter: Atrial fibrillation 94 () bpm. No pauses. 8 VPC's. No symptoms.              On metoprolol 100 mg Q12 and digoxin 0.25 mg Q24.   9/2021: [place], [place]: Admitted with fast VRR. Diltiazem 180 mg Q24 was begun.   10/22/2021: Metoprolol tartrate 100 mg Q12 was changed to metoprolol succinate 100 mg Q12.   10/5/2022: Holter: Atrial fibrillation 80 () bpm. No prolonged pauses. Narrow QRS. Rare VPCs - <1/hr. No reported symptoms.     On metoprolol 100 mg Q12, diltiazem 180 mg Q24 and digoxin 0.25 mg Q24.   Needing 3 drugs for control of rate.              Rate appears well controlled.                  2. Chronic Anticoagulation              [id number] (CHA2Sc).              Anticoagulation with apixiban.              On apixiban 2.5 mg Q12.              No aspirin or NSAID.     3. Heart Failure, Diastolic, Chronic              6/19/2020: Echo: Normal left ventricular size with low normal systolic function. EF 50%. Severely dilated LA. Severely dilated RA. Moderate aortic valve sclerosis. Mild to moderate AR. Moderate MR. Moderate TR. SPAP 56 mmHg.               6/18/2020: Presented in HF. Received furosemide 20 mg iv Q12.              6/22/2020: .              On metoprolol 100 mg Q12, diltiazem 180 mg Q24, lisinopril 40 mg Q24, spironolactone 25 mg Q24 and furosemide 20 mg Q24.   A SGLT2i would be a reasonable addition.              Appears well compesated     4. Mitral Regurgitation              6/19/2020: Echo: Moderate MR.     5. Pulmonary Hypertension              6/19/2020: Echo: SPAP 56 mmHg.               6. Hypertension   2010: Diagnosed.              Used to be on nadolol 40 mg Q12 and lisinopril 40 mg Q24.              On metoprolol 100 mg Q12, diltiazem 180 mg Q24, lisinopril 40 mg Q24, spironolactone 25 mg Q24 and furosemide 20 mg Q24.   Keeping log at  home.   Well controlled.     7. History of Hyponatremia              6/20/2020: Na 128.              Fluid restriction.              6/22/2020: Na 135.              Improving.      8. Migraine               Occasional tramadol.     9. History of Confusion              6/19/2020: Agitation and confusion.              Resolved.     10. Primary Care              Dr. Abran Terrazas.     F/u 4 months.    Donnell Mckeon M.D.

## 2023-03-23 ENCOUNTER — TELEPHONE (OUTPATIENT)
Dept: CARDIOLOGY | Facility: CLINIC | Age: 83
End: 2023-03-23
Payer: MEDICARE

## 2023-03-23 NOTE — TELEPHONE ENCOUNTER
Patient notified and verbalized understanding.          Patient states she saw Dr. Terrazas about a month ago BP was low in his office, then she went out of town for  3 weeks.  When BP is low she feels dizzy. BP 88/60 after medicine. She takes about 3/4 of a tablet of metoprolol.  Patient did not record BP readings but she says the highest -120 systolic, HR 89.    Please advise          ----- Message from [employer] [place] sent at 3/23/2023 10:43 AM CDT -----  Name of Who is Calling:TAQUERIA SWEET [[id number]]              What is the request in detail:Requesting a call back regarding blood pressure issue.               Can the clinic reply by [facility]:              What Number to Call Back if not in [facility]:[phone]

## 2023-04-18 ENCOUNTER — LAB VISIT (OUTPATIENT)
Dept: LAB | Facility: OTHER | Age: 83
End: 2023-04-18
Attending: INTERNAL MEDICINE
Payer: MEDICARE

## 2023-04-18 DIAGNOSIS — R94.6 ABNORMAL RESULTS OF THYROID FUNCTION STUDIES: ICD-10-CM

## 2023-04-18 DIAGNOSIS — R79.89 ELEVATED TSH: ICD-10-CM

## 2023-04-18 DIAGNOSIS — E06.4 DRUG-INDUCED THYROIDITIS: ICD-10-CM

## 2023-04-18 LAB
ANION GAP SERPL CALC-SCNC: 11 MMOL/L (ref 8–16)
BUN SERPL-MCNC: 42 MG/DL (ref 8–23)
CALCIUM SERPL-MCNC: 10.2 MG/DL (ref 8.7–10.5)
CHLORIDE SERPL-SCNC: 103 MMOL/L (ref 95–110)
CO2 SERPL-SCNC: 21 MMOL/L (ref 23–29)
CREAT SERPL-MCNC: 1.2 MG/DL (ref 0.5–1.4)
EST. GFR  (NO RACE VARIABLE): 45 ML/MIN/1.73 M^2
GLUCOSE SERPL-MCNC: 106 MG/DL (ref 70–110)
POTASSIUM SERPL-SCNC: 5.2 MMOL/L (ref 3.5–5.1)
SODIUM SERPL-SCNC: 135 MMOL/L (ref 136–145)
T4 SERPL-MCNC: 5 UG/DL (ref 4.5–11.5)
TSH SERPL DL<=0.005 MIU/L-ACNC: 0.95 UIU/ML (ref 0.4–4)

## 2023-04-18 PROCEDURE — 84443 ASSAY THYROID STIM HORMONE: CPT | Performed by: INTERNAL MEDICINE

## 2023-04-18 PROCEDURE — 84436 ASSAY OF TOTAL THYROXINE: CPT | Performed by: INTERNAL MEDICINE

## 2023-04-18 PROCEDURE — 80048 BASIC METABOLIC PNL TOTAL CA: CPT | Performed by: INTERNAL MEDICINE

## 2023-04-18 PROCEDURE — 36415 COLL VENOUS BLD VENIPUNCTURE: CPT | Performed by: INTERNAL MEDICINE

## 2023-04-19 ENCOUNTER — OFFICE VISIT (OUTPATIENT)
Dept: CARDIOLOGY | Facility: CLINIC | Age: 83
End: 2023-04-19
Attending: INTERNAL MEDICINE
Payer: MEDICARE

## 2023-04-19 ENCOUNTER — PATIENT MESSAGE (OUTPATIENT)
Dept: CARDIOLOGY | Facility: CLINIC | Age: 83
End: 2023-04-19

## 2023-04-19 VITALS
DIASTOLIC BLOOD PRESSURE: 72 MMHG | BODY MASS INDEX: 21 KG/M2 | SYSTOLIC BLOOD PRESSURE: 118 MMHG | HEIGHT: 64 IN | WEIGHT: 123 LBS | HEART RATE: 77 BPM | OXYGEN SATURATION: 97 %

## 2023-04-19 DIAGNOSIS — I34.0 NONRHEUMATIC MITRAL VALVE REGURGITATION: ICD-10-CM

## 2023-04-19 DIAGNOSIS — I27.22 PULMONARY HYPERTENSION DUE TO LEFT HEART DISEASE: ICD-10-CM

## 2023-04-19 DIAGNOSIS — I50.32 HEART FAILURE, DIASTOLIC, CHRONIC: ICD-10-CM

## 2023-04-19 DIAGNOSIS — Z79.01 CHRONIC ANTICOAGULATION: ICD-10-CM

## 2023-04-19 DIAGNOSIS — I48.21 PERMANENT ATRIAL FIBRILLATION: ICD-10-CM

## 2023-04-19 DIAGNOSIS — I10 PRIMARY HYPERTENSION: ICD-10-CM

## 2023-04-19 PROCEDURE — 93005 ELECTROCARDIOGRAM TRACING: CPT

## 2023-04-19 PROCEDURE — 99999 PR PBB SHADOW E&M-EST. PATIENT-LVL III: ICD-10-PCS | Mod: PBBFAC,,, | Performed by: INTERNAL MEDICINE

## 2023-04-19 PROCEDURE — 99215 PR OFFICE/OUTPT VISIT, EST, LEVL V, 40-54 MIN: ICD-10-PCS | Mod: S$PBB,25,, | Performed by: INTERNAL MEDICINE

## 2023-04-19 PROCEDURE — 93005 ELECTROCARDIOGRAM TRACING: CPT | Mod: PBBFAC | Performed by: INTERNAL MEDICINE

## 2023-04-19 PROCEDURE — 99213 OFFICE O/P EST LOW 20 MIN: CPT | Mod: PBBFAC | Performed by: INTERNAL MEDICINE

## 2023-04-19 PROCEDURE — 99215 OFFICE O/P EST HI 40 MIN: CPT | Mod: S$PBB,25,, | Performed by: INTERNAL MEDICINE

## 2023-04-19 PROCEDURE — 93010 PR ELECTROCARDIOGRAM REPORT: ICD-10-PCS | Mod: S$PBB,,, | Performed by: INTERNAL MEDICINE

## 2023-04-19 PROCEDURE — 93010 ELECTROCARDIOGRAM REPORT: CPT | Mod: S$PBB,,, | Performed by: INTERNAL MEDICINE

## 2023-04-19 PROCEDURE — 99999 PR PBB SHADOW E&M-EST. PATIENT-LVL III: CPT | Mod: PBBFAC,,, | Performed by: INTERNAL MEDICINE

## 2023-04-19 RX ORDER — DIGOXIN 250 MCG
250 TABLET ORAL DAILY
Qty: 90 TABLET | Refills: 3 | Status: SHIPPED | OUTPATIENT
Start: 2023-04-19

## 2023-04-19 RX ORDER — SPIRONOLACTONE 25 MG/1
12.5 TABLET ORAL DAILY
Qty: 45 TABLET | Refills: 3 | Status: SHIPPED | OUTPATIENT
Start: 2023-04-19 | End: 2024-03-12

## 2023-04-19 RX ORDER — LISINOPRIL 10 MG/1
10 TABLET ORAL DAILY
Qty: 90 TABLET | Refills: 3 | Status: SHIPPED | OUTPATIENT
Start: 2023-04-19 | End: 2023-10-19

## 2023-04-19 RX ORDER — METOPROLOL SUCCINATE 100 MG/1
100 TABLET, EXTENDED RELEASE ORAL 2 TIMES DAILY
Qty: 180 TABLET | Refills: 3 | Status: SHIPPED | OUTPATIENT
Start: 2023-04-19

## 2023-04-19 RX ORDER — FUROSEMIDE 20 MG/1
20 TABLET ORAL DAILY
Qty: 90 TABLET | Refills: 3 | Status: SHIPPED | OUTPATIENT
Start: 2023-04-19

## 2023-04-19 RX ORDER — DILTIAZEM HYDROCHLORIDE 180 MG/1
180 CAPSULE, COATED, EXTENDED RELEASE ORAL DAILY
Qty: 90 CAPSULE | Refills: 3 | Status: SHIPPED | OUTPATIENT
Start: 2023-04-19

## 2023-04-19 NOTE — PROGRESS NOTES
Subjective:     Ellen Neves is a 83 y.o. female with hypertension. She has a healthy weight. On about 5/20/2020 she had nausea and vomited. She felt weak. At that time she also noted that her heart rate had become elevated. She mostly recorded ar heart rate 100-120 bpm. She continued to record an elevated heart rate over the coming weeks. On about 6/15/2020 she felt increasingly weak and began being short of breath. She noted her abdomen had become mildly distended. She saw Dr. Abran Terrazas on 6/18/2020 and was referred to the emergency room at [facility] for admission. She was in atrial fibrillation with a ventricular response rate of about 150 bpm on presentation. She denied any chest pain. She received diltiazem intravenously and metoprolol orally for rate control. Her rate was difficult to control. Digoxin was later added. On 6/19/2020 she had an echocardiogram that revealed normal left ventricular size with low normal systolic function with ejection fraction of 50%. The left atrium was severely dilated as was the right atrium. There was moderate aortic valve sclerosis, mild to moderate aortic regurgitation and moderate mitral regurgitation. In addition there was moderate tricuspid regurgitation and the systolic pulmonary artery pressure was 56 mmHg. On 6/19/2020 she had a spell of agitation and confusion. She was released for follow up. In 9/2021 she was admitted to [facility] due to fast ventricular response rate even though she had been compliant with her metoprolol and digoxin. Diltiazem was added to her regimen and she was released. On 10/5/2022 she had a holter that revealed atrial fibrillation 80 () bpm. There were no prolonged pauses. The QRS complexes were narrow. There were rare ventricular premature contractions of less than 1/hr. No reported symptoms. She denies any exertional chest pain but has moderate exertional dyspnea. No palpitations or weak  spells. No bleeding. No issues with any of her prescribed medications. Feeling well overall.      Atrial Fibrillation  Presents for follow-up visit. Symptoms include hypertension and weakness. Symptoms are negative for bradycardia, chest pain, dizziness, hypotension, palpitations, shortness of breath, syncope and tachycardia. The symptoms have been stable. Past medical history includes CHF.   Congestive Heart Failure  Presents for follow-up visit. Pertinent negatives include no abdominal pain, chest pain, chest pressure, claudication, edema, fatigue, muscle weakness, near-syncope, nocturia, orthopnea, palpitations, paroxysmal nocturnal dyspnea, shortness of breath or unexpected weight change. The symptoms have been stable.   Hypertension  This is a chronic problem. The current episode started more than 1 year ago. The problem is unchanged. The problem is controlled (usually 110-120/70-80 mmHg at home). Pertinent negatives include no anxiety, blurred vision, chest pain, headaches, malaise/fatigue, neck pain, orthopnea, palpitations, peripheral edema, PND, shortness of breath or sweats.     Review of Systems   Constitutional: Negative for chills, fatigue, fever, malaise/fatigue and unexpected weight change.   HENT:  Negative for nosebleeds.    Eyes:  Positive for vision loss in left eye and vision loss in right eye. Negative for blurred vision and double vision.   Cardiovascular:  Negative for chest pain, claudication, dyspnea on exertion, irregular heartbeat, leg swelling, near-syncope, orthopnea, palpitations, paroxysmal nocturnal dyspnea and syncope.   Respiratory:  Negative for cough, hemoptysis, shortness of breath and wheezing.    Endocrine: Negative for cold intolerance and heat intolerance.   Hematologic/Lymphatic: Negative for bleeding problem. Does not bruise/bleed easily.   Skin:  Negative for color change and rash.   Musculoskeletal:  Negative for back pain, falls, muscle weakness, myalgias and neck pain.    Gastrointestinal:  Negative for abdominal pain, heartburn, hematemesis, hematochezia, hemorrhoids, jaundice, melena, nausea and vomiting.   Genitourinary:  Negative for dysuria, hematuria and nocturia.   Neurological:  Positive for weakness. Negative for dizziness, focal weakness, headaches, light-headedness, loss of balance, numbness and vertigo.   Psychiatric/Behavioral:  Negative for altered mental status, depression and memory loss. The patient is not nervous/anxious.    Allergic/Immunologic: Negative for hives and persistent infections.       Current Outpatient Medications on File Prior to Visit   Medication Sig Dispense Refill    acetaminophen (TYLENOL) 500 MG tablet Take 2 tablets (1,000 mg total) by mouth every 8 (eight) hours as needed for Pain.  0    citalopram (CELEXA) 20 MG tablet TAKE 1 TABLET DAILY 90 tablet 3    digoxin (LANOXIN) 250 mcg tablet TAKE 1 TABLET BY MOUTH EVERY DAY 14 tablet 2    diltiaZEM (CARDIZEM CD) 180 MG 24 hr capsule Take 1 capsule (180 mg total) by mouth once daily. 90 capsule 3    diltiaZEM (CARDIZEM CD) 180 MG 24 hr capsule TAKE 1 CAPSULE DAILY 90 capsule 3    ELIQUIS 2.5 mg Tab TAKE 1 TABLET TWICE A  tablet 3    furosemide (LASIX) 20 MG tablet TAKE 1 TABLET DAILY 90 tablet 3    lisinopriL (PRINIVIL,ZESTRIL) 40 MG tablet TAKE 1 TABLET DAILY (Patient taking differently: 20 mg 2 (two) times daily.) 90 tablet 3    metoprolol succinate (TOPROL-XL) 100 MG 24 hr tablet Take 1 tablet (100 mg total) by mouth 2 (two) times daily. 180 tablet 3    metoprolol succinate (TOPROL-XL) 100 MG 24 hr tablet TAKE 1 TABLET TWICE A  tablet 3    ondansetron (ZOFRAN-ODT) 8 MG TbDL DISSOLVE ONE TABLET BY MOUTH EVERY 6 TO 8 HOURS AS NEEDED FOR NAUSEA      spironolactone (ALDACTONE) 25 MG tablet TAKE 1 TABLET DAILY 90 tablet 3    temazepam (RESTORIL) 15 mg Cap TAKE 1 CAPSULE(15 MG) BY MOUTH EVERY NIGHT AS NEEDED 30 capsule 2    traMADoL (ULTRAM) 50 mg tablet TAKE 1 TABLET BY MOUTH EVERY 6-8  "HOURS AS NEEDED FOR PAIN More than 7 day quantity medically necessary 100 tablet 0    vit C,B-Ij-dvgxs-lutein-zeaxan (PRESERVISION AREDS 2) [id number] mg-unit-mg-mg Cap Take 1 each by mouth 2 (two) times a day.       No current facility-administered medications on file prior to visit.       /72 (BP Location: Left arm, Patient Position: Sitting, BP Method: Medium (Manual))   Pulse 77   Ht 5' 4" (1.626 m)   Wt 55.8 kg (123 lb 0.3 oz)   SpO2 97%   BMI 21.12 kg/m²       Objective:     Physical Exam  Constitutional:       General: She is not in acute distress.     Appearance: Normal appearance. She is well-developed. She is not toxic-appearing or diaphoretic.   HENT:      Head: Normocephalic and atraumatic.      Nose: Nose normal.   Eyes:      General:         Right eye: No discharge.         Left eye: No discharge.      Conjunctiva/sclera:      Right eye: Right conjunctiva is not injected.      Left eye: Left conjunctiva is not injected.      Pupils: Pupils are equal.      Right eye: Pupil is round.      Left eye: Pupil is round.   Neck:      Thyroid: No thyromegaly.      Vascular: No carotid bruit or JVD.   Cardiovascular:      Rate and Rhythm: Normal rate. Rhythm irregularly irregular. No extrasystoles are present.     Chest Wall: PMI is not displaced.      Pulses:           Radial pulses are 2+ on the right side and 2+ on the left side.        Femoral pulses are 2+ on the right side and 2+ on the left side.       Dorsalis pedis pulses are 2+ on the right side and 2+ on the left side.        Posterior tibial pulses are 2+ on the right side and 2+ on the left side.      Heart sounds: S1 normal and S2 normal. Murmur heard.   High-pitched blowing holosystolic murmur is present with a grade of 2/6 at the apex.     No gallop.   Pulmonary:      Effort: Pulmonary effort is normal.      Breath sounds: Normal breath sounds.   Abdominal:      Palpations: Abdomen is soft.      Tenderness: There is no abdominal " tenderness.   Musculoskeletal:      Cervical back: Neck supple.      Right ankle: No swelling, deformity or ecchymosis.      Left ankle: No swelling, deformity or ecchymosis.   Lymphadenopathy:      Head:      Right side of head: No submandibular adenopathy.      Left side of head: No submandibular adenopathy.      Cervical: No cervical adenopathy.   Skin:     General: Skin is warm and dry.   Neurological:      General: No focal deficit present.      Mental Status: She is alert and oriented to person, place, and time. She is not disoriented.      Cranial Nerves: No cranial nerve deficit.   Psychiatric:         Attention and Perception: Attention and perception normal.         Mood and Affect: Mood and affect normal.         Speech: Speech normal.         Behavior: Behavior normal.         Thought Content: Thought content normal.         Cognition and Memory: Cognition and memory normal.         Judgment: Judgment normal.       Assessment:     1. Permanent atrial fibrillation    2. Chronic anticoagulation    3. Heart failure, diastolic, chronic    4. Nonrheumatic mitral valve regurgitation    5. Pulmonary hypertension due to left heart disease    6. Primary hypertension        Plan:     1. Atrial Fibrillation              5/20/2020: Clinical onset of persistent atrial fibrillation.              6/18/2020: Presents with fast VRR. Received diltiazem iv.               [id number] (CHA2Sc).              6/19/2020: Began digoxin.              On metoprolol 100 mg Q12 and digoxin 0.125 mg Q24.              Anticoagulation with apixiban.              6/21/2020: VRR on high side.              6/21/2020: Could possibly consider RAYSA guided CV in the future but likelihood on long term success with rhythm control strategy would be low. Long discussion with patient about options. 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 We decided on a rate control  strategy at least for now which I agree is very reasonable.              6/22/2020: Digoxin 1.0.   7/28/2020: Holter: Atrial fibrillation 94 () bpm. No pauses. 8 VPC's. No symptoms.              On metoprolol 100 mg Q12 and digoxin 0.25 mg Q24.   9/2021: [facility], [place]: Admitted with fast VRR. Diltiazem 180 mg Q24 was begun.   10/22/2021: Metoprolol tartrate 100 mg Q12 was changed to metoprolol succinate 100 mg Q12.   10/5/2022: Holter: Atrial fibrillation 80 () bpm. No prolonged pauses. Narrow QRS. Rare VPCs - <1/hr. No reported symptoms.     On metoprolol 100 mg Q12, diltiazem 180 mg Q24 and digoxin 0.25 mg Q24.   Needing 3 drugs for control of rate.              Rate appears well controlled.                  2. Chronic Anticoagulation              [id number] (CHA2Sc).              Anticoagulation with apixiban.              On apixiban 2.5 mg Q12.              No aspirin or NSAID.     3. Heart Failure, Diastolic, Chronic              6/19/2020: Echo: Normal left ventricular size with low normal systolic function. EF 50%. Severely dilated LA. Severely dilated RA. Moderate aortic valve sclerosis. Mild to moderate AR. Moderate MR. Moderate TR. SPAP 56 mmHg.               6/18/2020: Presented in HF. Received furosemide 20 mg iv Q12.              6/22/2020: .              On metoprolol 100 mg Q12, diltiazem 180 mg Q24, lisinopril 40 mg Q24, spironolactone 25 mg Q24 and furosemide 20 mg Q24.   A SGLT2i would be a reasonable addition.              Appears well compensated.   4/19/2023: Lisinopril 40 mg Q24 to be reduced to lisinopril 10 mg Q24 and spironolactone 25 mg to be reduced to spironolactone 12.5 mg Q24 as pressure been on lo side and K 5.2. In 2 weeks do BMP and BNP.     4. Mitral Regurgitation              6/19/2020: Echo: Moderate MR.     5. Pulmonary Hypertension              6/19/2020: Echo: SPAP 56 mmHg.               6. Hypertension   2010: Diagnosed.              Used to  be on nadolol 40 mg Q12 and lisinopril 40 mg Q24.              On metoprolol 100 mg Q12, diltiazem 180 mg Q24, lisinopril 40 mg Q24, spironolactone 25 mg Q24 and furosemide 20 mg Q24.   Keeping log at home.   4/19/2023: As above.     7. History of Hyponatremia              6/20/2020: Na 128.              Fluid restriction.              6/22/2020: Na 135.              Improving.      8. Migraine               Occasional tramadol.     9. History of Confusion              6/19/2020: Agitation and confusion.              Resolved.     10. Primary Care              Dr. Abran Terrazas.     F/u 2 months.    Donnell Mckeon M.D.

## 2023-05-03 ENCOUNTER — TELEPHONE (OUTPATIENT)
Dept: CARDIOLOGY | Facility: CLINIC | Age: 83
End: 2023-05-03
Payer: MEDICARE

## 2023-05-03 ENCOUNTER — LAB VISIT (OUTPATIENT)
Dept: LAB | Facility: OTHER | Age: 83
End: 2023-05-03
Attending: INTERNAL MEDICINE
Payer: MEDICARE

## 2023-05-03 DIAGNOSIS — I50.32 HEART FAILURE, DIASTOLIC, CHRONIC: ICD-10-CM

## 2023-05-03 LAB
ANION GAP SERPL CALC-SCNC: 13 MMOL/L (ref 8–16)
BNP SERPL-MCNC: 122 PG/ML (ref 0–99)
BUN SERPL-MCNC: 39 MG/DL (ref 8–23)
CALCIUM SERPL-MCNC: 9.9 MG/DL (ref 8.7–10.5)
CHLORIDE SERPL-SCNC: 103 MMOL/L (ref 95–110)
CO2 SERPL-SCNC: 18 MMOL/L (ref 23–29)
CREAT SERPL-MCNC: 1.3 MG/DL (ref 0.5–1.4)
EST. GFR  (NO RACE VARIABLE): 41 ML/MIN/1.73 M^2
GLUCOSE SERPL-MCNC: 116 MG/DL (ref 70–110)
POTASSIUM SERPL-SCNC: 4.8 MMOL/L (ref 3.5–5.1)
SODIUM SERPL-SCNC: 134 MMOL/L (ref 136–145)

## 2023-05-03 PROCEDURE — 36415 COLL VENOUS BLD VENIPUNCTURE: CPT | Performed by: INTERNAL MEDICINE

## 2023-05-03 PROCEDURE — 83880 ASSAY OF NATRIURETIC PEPTIDE: CPT | Performed by: INTERNAL MEDICINE

## 2023-05-03 PROCEDURE — 80048 BASIC METABOLIC PNL TOTAL CA: CPT | Performed by: INTERNAL MEDICINE

## 2023-05-03 NOTE — TELEPHONE ENCOUNTER
Pt notified and verbalized understanding.      ----- Message from Donnell Mckeon MD sent at 5/3/2023  1:51 PM CDT -----  Laboratory data reviewed. All results acceptable.    Stay with plan.     Please call the patient and inform the patient about results.     Donnell Mckeon M.D.

## 2023-06-20 ENCOUNTER — OFFICE VISIT (OUTPATIENT)
Dept: CARDIOLOGY | Facility: CLINIC | Age: 83
End: 2023-06-20
Attending: INTERNAL MEDICINE
Payer: MEDICARE

## 2023-06-20 VITALS
DIASTOLIC BLOOD PRESSURE: 59 MMHG | BODY MASS INDEX: 20.32 KG/M2 | WEIGHT: 119 LBS | HEIGHT: 64 IN | OXYGEN SATURATION: 97 % | HEART RATE: 74 BPM | SYSTOLIC BLOOD PRESSURE: 106 MMHG

## 2023-06-20 DIAGNOSIS — I34.0 NONRHEUMATIC MITRAL VALVE REGURGITATION: ICD-10-CM

## 2023-06-20 DIAGNOSIS — Z79.01 CHRONIC ANTICOAGULATION: ICD-10-CM

## 2023-06-20 DIAGNOSIS — I10 PRIMARY HYPERTENSION: ICD-10-CM

## 2023-06-20 DIAGNOSIS — I48.21 PERMANENT ATRIAL FIBRILLATION: ICD-10-CM

## 2023-06-20 DIAGNOSIS — G43.009 MIGRAINE WITHOUT AURA AND WITHOUT STATUS MIGRAINOSUS, NOT INTRACTABLE: ICD-10-CM

## 2023-06-20 DIAGNOSIS — I27.22 PULMONARY HYPERTENSION DUE TO LEFT HEART DISEASE: ICD-10-CM

## 2023-06-20 DIAGNOSIS — I50.32 HEART FAILURE, DIASTOLIC, CHRONIC: ICD-10-CM

## 2023-06-20 PROCEDURE — 99999 PR PBB SHADOW E&M-EST. PATIENT-LVL III: CPT | Mod: PBBFAC,,, | Performed by: INTERNAL MEDICINE

## 2023-06-20 PROCEDURE — 99214 PR OFFICE/OUTPT VISIT, EST, LEVL IV, 30-39 MIN: ICD-10-PCS | Mod: S$PBB,,, | Performed by: INTERNAL MEDICINE

## 2023-06-20 PROCEDURE — 99999 PR PBB SHADOW E&M-EST. PATIENT-LVL III: ICD-10-PCS | Mod: PBBFAC,,, | Performed by: INTERNAL MEDICINE

## 2023-06-20 PROCEDURE — 99214 OFFICE O/P EST MOD 30 MIN: CPT | Mod: S$PBB,,, | Performed by: INTERNAL MEDICINE

## 2023-06-20 PROCEDURE — 99213 OFFICE O/P EST LOW 20 MIN: CPT | Mod: PBBFAC | Performed by: INTERNAL MEDICINE

## 2023-06-20 NOTE — PROGRESS NOTES
Subjective:     Ellen Neves is a 83 y.o. female with hypertension. She has a healthy weight. On about 5/20/2020 she had nausea and vomited. She felt weak. At that time she also noted that her heart rate had become elevated. She mostly recorded ar heart rate 100-120 bpm. She continued to record an elevated heart rate over the coming weeks. On about 6/15/2020 she felt increasingly weak and began being short of breath. She noted her abdomen had become mildly distended. She saw Dr. Abran Terrazas on 6/18/2020 and was referred to the emergency room at [facility] for admission. She was in atrial fibrillation with a ventricular response rate of about 150 bpm on presentation. She denied any chest pain. She received diltiazem intravenously and metoprolol orally for rate control. Her rate was difficult to control. Digoxin was later added. On 6/19/2020 she had an echocardiogram that revealed normal left ventricular size with low normal systolic function with ejection fraction of 50%. The left atrium was severely dilated as was the right atrium. There was moderate aortic valve sclerosis, mild to moderate aortic regurgitation and moderate mitral regurgitation. In addition there was moderate tricuspid regurgitation and the systolic pulmonary artery pressure was 56 mmHg. On 6/19/2020 she had a spell of agitation and confusion. She was released for follow up. In 9/2021 she was admitted to [facility] ER due to fast ventricular response rate even though she had been compliant with her metoprolol and digoxin. Diltiazem was added to her regimen and she was released. On 10/5/2022 she had a holter that revealed atrial fibrillation 80 () bpm. There were no prolonged pauses. The QRS complexes were narrow. There were rare ventricular premature contractions of less than 1/hr. No reported symptoms. On 4/19/2023 the lisinopril 40 mg Q24 was reduced to lisinopril 10 mg Q24 and spironolactone 25 mg was  reduced to spironolactone 12.5 mg Q24 as pressure had been on the side. Her pressure came up. She denies any exertional chest pain but has moderate exertional dyspnea. No palpitations or weak spells. No bleeding. No issues with any of her prescribed medications. Feeling well overall.      Atrial Fibrillation  Presents for follow-up visit. Symptoms include hypertension and weakness. Symptoms are negative for bradycardia, chest pain, dizziness, hypotension, palpitations, shortness of breath, syncope and tachycardia. The symptoms have been stable. Past medical history includes CHF.   Congestive Heart Failure  Presents for follow-up visit. Pertinent negatives include no abdominal pain, chest pain, chest pressure, claudication, edema, fatigue, muscle weakness, near-syncope, nocturia, orthopnea, palpitations, paroxysmal nocturnal dyspnea, shortness of breath or unexpected weight change. The symptoms have been stable.   Hypertension  This is a chronic problem. The current episode started more than 1 year ago. The problem is unchanged. The problem is controlled (usually 110-120/70-80 mmHg at home). Pertinent negatives include no anxiety, blurred vision, chest pain, headaches, malaise/fatigue, neck pain, orthopnea, palpitations, peripheral edema, PND, shortness of breath or sweats.     Review of Systems   Constitutional: Negative for chills, fatigue, fever, malaise/fatigue and unexpected weight change.   HENT:  Negative for nosebleeds.    Eyes:  Positive for vision loss in left eye and vision loss in right eye. Negative for blurred vision and double vision.   Cardiovascular:  Negative for chest pain, claudication, dyspnea on exertion, irregular heartbeat, leg swelling, near-syncope, orthopnea, palpitations, paroxysmal nocturnal dyspnea and syncope.   Respiratory:  Negative for cough, hemoptysis, shortness of breath and wheezing.    Endocrine: Negative for cold intolerance and heat intolerance.   Hematologic/Lymphatic:  Negative for bleeding problem. Does not bruise/bleed easily.   Skin:  Negative for color change and rash.   Musculoskeletal:  Negative for back pain, falls, muscle weakness, myalgias and neck pain.   Gastrointestinal:  Negative for abdominal pain, heartburn, hematemesis, hematochezia, hemorrhoids, jaundice, melena, nausea and vomiting.   Genitourinary:  Negative for dysuria, hematuria and nocturia.   Neurological:  Positive for weakness. Negative for dizziness, focal weakness, headaches, light-headedness, loss of balance, numbness and vertigo.   Psychiatric/Behavioral:  Negative for altered mental status, depression and memory loss. The patient is not nervous/anxious.    Allergic/Immunologic: Negative for hives and persistent infections.       Current Outpatient Medications on File Prior to Visit   Medication Sig Dispense Refill    acetaminophen (TYLENOL) 500 MG tablet Take 2 tablets (1,000 mg total) by mouth every 8 (eight) hours as needed for Pain.  0    apixaban (ELIQUIS) 2.5 mg Tab Take 1 tablet (2.5 mg total) by mouth 2 (two) times daily. 180 tablet 3    citalopram (CELEXA) 20 MG tablet TAKE 1 TABLET DAILY 90 tablet 3    digoxin (LANOXIN) 250 mcg tablet Take 1 tablet (250 mcg total) by mouth once daily. 90 tablet 3    diltiaZEM (CARDIZEM CD) 180 MG 24 hr capsule Take 1 capsule (180 mg total) by mouth once daily. 90 capsule 3    furosemide (LASIX) 20 MG tablet Take 1 tablet (20 mg total) by mouth once daily. 90 tablet 3    lisinopriL 10 MG tablet Take 1 tablet (10 mg total) by mouth once daily. 90 tablet 3    metoprolol succinate (TOPROL-XL) 100 MG 24 hr tablet Take 1 tablet (100 mg total) by mouth 2 (two) times daily. 180 tablet 3    ondansetron (ZOFRAN-ODT) 8 MG TbDL DISSOLVE ONE TABLET BY MOUTH EVERY 6 TO 8 HOURS AS NEEDED FOR NAUSEA      spironolactone (ALDACTONE) 25 MG tablet Take 0.5 tablets (12.5 mg total) by mouth once daily. 45 tablet 3    temazepam (RESTORIL) 15 mg Cap TAKE 1 CAPSULE(15 MG) BY MOUTH  "EVERY NIGHT AS NEEDED 30 capsule 2    traMADoL (ULTRAM) 50 mg tablet TAKE 1 TABLET BY MOUTH EVERY 6-8 HOURS AS NEEDED FOR PAIN More than 7 day quantity medically necessary 100 tablet 0    vit C,U-Tn-nvfwd-lutein-zeaxan (PRESERVISION AREDS 2) 129-102-49-1 mg-unit-mg-mg Cap Take 1 each by mouth 2 (two) times a day.       No current facility-administered medications on file prior to visit.       BP (!) 106/59 (BP Location: Right arm, Patient Position: Sitting, BP Method: Large (Manual))   Pulse 74   Ht 5' 4" (1.626 m)   Wt 54 kg (119 lb)   SpO2 97%   BMI 20.43 kg/m²       Objective:     Physical Exam  Constitutional:       General: She is not in acute distress.     Appearance: Normal appearance. She is well-developed. She is not toxic-appearing or diaphoretic.   HENT:      Head: Normocephalic and atraumatic.      Nose: Nose normal.   Eyes:      General:         Right eye: No discharge.         Left eye: No discharge.      Conjunctiva/sclera:      Right eye: Right conjunctiva is not injected.      Left eye: Left conjunctiva is not injected.      Pupils: Pupils are equal.      Right eye: Pupil is round.      Left eye: Pupil is round.   Neck:      Thyroid: No thyromegaly.      Vascular: No carotid bruit or JVD.   Cardiovascular:      Rate and Rhythm: Normal rate. Rhythm irregularly irregular. No extrasystoles are present.     Chest Wall: PMI is not displaced.      Pulses:           Radial pulses are 2+ on the right side and 2+ on the left side.        Femoral pulses are 2+ on the right side and 2+ on the left side.       Dorsalis pedis pulses are 2+ on the right side and 2+ on the left side.        Posterior tibial pulses are 2+ on the right side and 2+ on the left side.      Heart sounds: S1 normal and S2 normal. Murmur heard.   High-pitched blowing holosystolic murmur is present with a grade of 2/6 at the apex.     No gallop.   Pulmonary:      Effort: Pulmonary effort is normal.      Breath sounds: Normal breath " sounds.   Abdominal:      Palpations: Abdomen is soft.      Tenderness: There is no abdominal tenderness.   Musculoskeletal:      Cervical back: Neck supple.      Right ankle: No swelling, deformity or ecchymosis.      Left ankle: No swelling, deformity or ecchymosis.   Lymphadenopathy:      Head:      Right side of head: No submandibular adenopathy.      Left side of head: No submandibular adenopathy.      Cervical: No cervical adenopathy.   Skin:     General: Skin is warm and dry.   Neurological:      General: No focal deficit present.      Mental Status: She is alert and oriented to person, place, and time. She is not disoriented.      Cranial Nerves: No cranial nerve deficit.   Psychiatric:         Attention and Perception: Attention and perception normal.         Mood and Affect: Mood and affect normal.         Speech: Speech normal.         Behavior: Behavior normal.         Thought Content: Thought content normal.         Cognition and Memory: Cognition and memory normal.         Judgment: Judgment normal.       Assessment:     1. Permanent atrial fibrillation    2. Chronic anticoagulation    3. Heart failure, diastolic, chronic    4. Nonrheumatic mitral valve regurgitation    5. Pulmonary hypertension due to left heart disease    6. Primary hypertension    7. Migraine without aura and without status migrainosus, not intractable        Plan:     1. Atrial Fibrillation              5/20/2020: Clinical onset of persistent atrial fibrillation.              6/18/2020: Presents with fast VRR. Received diltiazem iv.               [id number] (CHA2Sc).              6/19/2020: Began digoxin.              On metoprolol 100 mg Q12 and digoxin 0.125 mg Q24.              Anticoagulation with apixiban.              6/21/2020: VRR on high side.              6/21/2020: Could possibly consider RAYSA guided CV in the future but likelihood on long term success with rhythm control strategy would be low. Long discussion with  patient about options. 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 We decided on a rate control strategy at least for now which I agree is very reasonable.              6/22/2020: Digoxin 1.0.   7/28/2020: Holter: Atrial fibrillation 94 () bpm. No pauses. 8 VPC's. No symptoms.              On metoprolol 100 mg Q12 and digoxin 0.25 mg Q24.   9/2021: [place]: Admitted with fast VRR. Diltiazem 180 mg Q24 was begun.   10/22/2021: Metoprolol tartrate 100 mg Q12 was changed to metoprolol succinate 100 mg Q12.   10/5/2022: Holter: Atrial fibrillation 80 () bpm. No prolonged pauses. Narrow QRS. Rare VPCs - <1/hr. No reported symptoms.     On metoprolol 100 mg Q12, diltiazem 180 mg Q24 and digoxin 0.25 mg Q24.   Needing 3 drugs for control of rate.              Rate appears well controlled.   She is monitoring her heart rate with a FitBit.   10/2023: Plan Holter.                  2. Chronic Anticoagulation              [id number] (CHA2Sc).              Anticoagulation with apixiban.              On apixiban 2.5 mg Q12.              No aspirin or NSAID.     3. Heart Failure, Diastolic, Chronic              6/19/2020: Echo: Normal left ventricular size with low normal systolic function. EF 50%. Severely dilated LA. Severely dilated RA. Moderate aortic valve sclerosis. Mild to moderate AR. Moderate MR. Moderate TR. SPAP 56 mmHg.               6/18/2020: Presented in HF. Received furosemide 20 mg iv Q12.              6/22/2020: .   4/19/2023: Lisinopril 40 mg Q24 was reduced to lisinopril 10 mg Q24 and spironolactone 25 mg was reduced to spironolactone 12.5 mg Q24 as pressure had been on low side and K 5.2.              On metoprolol 100 mg Q12, diltiazem 180 mg Q24, lisinopril 10 mg Q24, spironolactone 12.5 mg Q24 and furosemide 20 mg Q24.   A SGLT2i would be a reasonable addition.              Appears well  compensated.      4. Mitral Regurgitation              6/19/2020: Echo: Moderate MR.     5. Pulmonary Hypertension              6/19/2020: Echo: SPAP 56 mmHg.               6. Hypertension   2010: Diagnosed.              Used to be on nadolol 40 mg Q12 and lisinopril 40 mg Q24.              On metoprolol 100 mg Q12, diltiazem 180 mg Q24, lisinopril 10 mg Q24, spironolactone 12.5 mg Q24 and furosemide 20 mg Q24.   Keeping log at home.   Well controlled.     7. History of Hyponatremia              6/20/2020: Na 128.              Fluid restriction.              6/22/2020: Na 135.              Improving.      8. Migraine               Occasional tramadol.     9. History of Confusion              6/19/2020: Agitation and confusion.              Resolved.     10. Primary Care              Dr. Abran Terrazas.     F/u 3 months.    Donnell Mckeon M.D.

## 2023-09-05 ENCOUNTER — HOSPITAL ENCOUNTER (OUTPATIENT)
Dept: RADIOLOGY | Facility: OTHER | Age: 83
Discharge: HOME | End: 2023-09-05
Attending: INTERNAL MEDICINE
Payer: MEDICARE

## 2023-09-05 DIAGNOSIS — Z12.31 SCREENING MAMMOGRAM FOR BREAST CANCER: ICD-10-CM

## 2023-09-05 PROCEDURE — 77063 BREAST TOMOSYNTHESIS BI: CPT | Mod: 26,,, | Performed by: RADIOLOGY

## 2023-09-05 PROCEDURE — 77067 SCR MAMMO BI INCL CAD: CPT | Mod: TC

## 2023-09-05 PROCEDURE — 77067 SCR MAMMO BI INCL CAD: CPT | Mod: 26,,, | Performed by: RADIOLOGY

## 2023-09-05 PROCEDURE — 77063 MAMMO DIGITAL SCREENING BILAT WITH TOMO: ICD-10-PCS | Mod: 26,,, | Performed by: RADIOLOGY

## 2023-09-05 PROCEDURE — 77067 MAMMO DIGITAL SCREENING BILAT WITH TOMO: ICD-10-PCS | Mod: 26,,, | Performed by: RADIOLOGY

## 2023-10-19 ENCOUNTER — OFFICE VISIT (OUTPATIENT)
Dept: CARDIOLOGY | Facility: CLINIC | Age: 83
End: 2023-10-19
Attending: INTERNAL MEDICINE
Payer: MEDICARE

## 2023-10-19 VITALS
WEIGHT: 124 LBS | DIASTOLIC BLOOD PRESSURE: 55 MMHG | HEART RATE: 80 BPM | OXYGEN SATURATION: 96 % | SYSTOLIC BLOOD PRESSURE: 99 MMHG | BODY MASS INDEX: 21.17 KG/M2 | HEIGHT: 64 IN

## 2023-10-19 DIAGNOSIS — I10 PRIMARY HYPERTENSION: ICD-10-CM

## 2023-10-19 DIAGNOSIS — Z79.01 CHRONIC ANTICOAGULATION: ICD-10-CM

## 2023-10-19 DIAGNOSIS — I27.22 PULMONARY HYPERTENSION DUE TO LEFT HEART DISEASE: ICD-10-CM

## 2023-10-19 DIAGNOSIS — I34.0 NONRHEUMATIC MITRAL VALVE REGURGITATION: ICD-10-CM

## 2023-10-19 DIAGNOSIS — I50.32 HEART FAILURE, DIASTOLIC, CHRONIC: ICD-10-CM

## 2023-10-19 DIAGNOSIS — I48.21 PERMANENT ATRIAL FIBRILLATION: ICD-10-CM

## 2023-10-19 DIAGNOSIS — G43.009 MIGRAINE WITHOUT AURA AND WITHOUT STATUS MIGRAINOSUS, NOT INTRACTABLE: ICD-10-CM

## 2023-10-19 PROCEDURE — 99999 PR PBB SHADOW E&M-EST. PATIENT-LVL III: ICD-10-PCS | Mod: PBBFAC,,, | Performed by: INTERNAL MEDICINE

## 2023-10-19 PROCEDURE — 99214 PR OFFICE/OUTPT VISIT, EST, LEVL IV, 30-39 MIN: ICD-10-PCS | Mod: S$PBB,,, | Performed by: INTERNAL MEDICINE

## 2023-10-19 PROCEDURE — 99999 PR PBB SHADOW E&M-EST. PATIENT-LVL III: CPT | Mod: PBBFAC,,, | Performed by: INTERNAL MEDICINE

## 2023-10-19 PROCEDURE — 99214 OFFICE O/P EST MOD 30 MIN: CPT | Mod: S$PBB,,, | Performed by: INTERNAL MEDICINE

## 2023-10-19 PROCEDURE — 99213 OFFICE O/P EST LOW 20 MIN: CPT | Mod: PBBFAC | Performed by: INTERNAL MEDICINE

## 2023-10-19 NOTE — PROGRESS NOTES
Subjective:     Ellen Neves is a 83 y.o. female with hypertension. She has a healthy weight. On about 5/20/2020 she had nausea and vomited. She felt weak. At that time she also noted that her heart rate had become elevated. She mostly recorded ar heart rate 100-120 bpm. She continued to record an elevated heart rate over the coming weeks. On about 6/15/2020 she felt increasingly weak and began being short of breath. She noted her abdomen had become mildly distended. She saw Dr. Abran Terrazas on 6/18/2020 and was referred to the emergency room at [facility] for admission. She was in atrial fibrillation with a ventricular response rate of about 150 bpm on presentation. She denied any chest pain. She received diltiazem intravenously and metoprolol orally for rate control. Her rate was difficult to control. Digoxin was later added. On 6/19/2020 she had an echocardiogram that revealed normal left ventricular size with low normal systolic function with ejection fraction of 50%. The left atrium was severely dilated as was the right atrium. There was moderate aortic valve sclerosis, mild to moderate aortic regurgitation and moderate mitral regurgitation. In addition there was moderate tricuspid regurgitation and the systolic pulmonary artery pressure was 56 mmHg. On 6/19/2020 she had a spell of agitation and confusion. She was released for follow up. In 9/2021 she was admitted to [facility] due to fast ventricular response rate even though she had been compliant with her metoprolol and digoxin. Diltiazem was added to her regimen and she was released. On 10/5/2022 she had a holter that revealed atrial fibrillation 80 () bpm. There were no prolonged pauses. The QRS complexes were narrow. There were rare ventricular premature contractions of less than 1/hr. No reported symptoms. On 4/19/2023 the lisinopril 40 mg Q24 was reduced to lisinopril 10 mg Q24 and spironolactone 25 mg was  reduced to spironolactone 12.5 mg Q24 as pressure had been on the side. Her pressure came up. She denies any exertional chest pain but has moderate exertional dyspnea. No palpitations or weak spells. No bleeding. No issues with any of her prescribed medications. Feeling well overall.      Atrial Fibrillation  Presents for follow-up visit. Symptoms include hypertension and weakness. Symptoms are negative for bradycardia, chest pain, dizziness, hypotension, palpitations, shortness of breath, syncope and tachycardia. The symptoms have been stable. Past medical history includes CHF.   Congestive Heart Failure  Presents for follow-up visit. Pertinent negatives include no abdominal pain, chest pain, chest pressure, claudication, edema, fatigue, muscle weakness, near-syncope, nocturia, orthopnea, palpitations, paroxysmal nocturnal dyspnea, shortness of breath or unexpected weight change. The symptoms have been stable.   Hypertension  This is a chronic problem. The current episode started more than 1 year ago. The problem is unchanged. The problem is controlled (usually 110-120/70-80 mmHg at home). Pertinent negatives include no anxiety, blurred vision, chest pain, headaches, malaise/fatigue, neck pain, orthopnea, palpitations, peripheral edema, PND, shortness of breath or sweats.       Review of Systems   Constitutional: Negative for chills, fatigue, fever, malaise/fatigue and unexpected weight change.   HENT:  Negative for nosebleeds.    Eyes:  Positive for vision loss in left eye and vision loss in right eye. Negative for blurred vision and double vision.   Cardiovascular:  Positive for dyspnea on exertion. Negative for chest pain, claudication, irregular heartbeat, leg swelling, near-syncope, orthopnea, palpitations, paroxysmal nocturnal dyspnea and syncope.   Respiratory:  Negative for cough, hemoptysis, shortness of breath and wheezing.    Endocrine: Negative for cold intolerance and heat intolerance.    Hematologic/Lymphatic: Negative for bleeding problem. Does not bruise/bleed easily.   Skin:  Negative for color change and rash.   Musculoskeletal:  Negative for back pain, falls, muscle weakness, myalgias and neck pain.   Gastrointestinal:  Negative for abdominal pain, heartburn, hematemesis, hematochezia, hemorrhoids, jaundice, melena, nausea and vomiting.   Genitourinary:  Negative for dysuria, hematuria and nocturia.   Neurological:  Positive for weakness. Negative for dizziness, focal weakness, headaches, light-headedness, loss of balance, numbness and vertigo.   Psychiatric/Behavioral:  Negative for altered mental status, depression and memory loss. The patient is not nervous/anxious.    Allergic/Immunologic: Negative for hives and persistent infections.       Current Outpatient Medications on File Prior to Visit   Medication Sig Dispense Refill    acetaminophen (TYLENOL) 500 MG tablet Take 2 tablets (1,000 mg total) by mouth every 8 (eight) hours as needed for Pain.  0    apixaban (ELIQUIS) 2.5 mg Tab Take 1 tablet (2.5 mg total) by mouth 2 (two) times daily. 180 tablet 3    citalopram (CELEXA) 20 MG tablet TAKE 1 TABLET DAILY 90 tablet 3    digoxin (LANOXIN) 250 mcg tablet Take 1 tablet (250 mcg total) by mouth once daily. 90 tablet 3    diltiaZEM (CARDIZEM CD) 180 MG 24 hr capsule Take 1 capsule (180 mg total) by mouth once daily. 90 capsule 3    furosemide (LASIX) 20 MG tablet Take 1 tablet (20 mg total) by mouth once daily. 90 tablet 3    lisinopriL 10 MG tablet Take 1 tablet (10 mg total) by mouth once daily. 90 tablet 3    metoprolol succinate (TOPROL-XL) 100 MG 24 hr tablet Take 1 tablet (100 mg total) by mouth 2 (two) times daily. 180 tablet 3    ondansetron (ZOFRAN-ODT) 8 MG TbDL DISSOLVE ONE TABLET BY MOUTH EVERY 6 TO 8 HOURS AS NEEDED FOR NAUSEA      spironolactone (ALDACTONE) 25 MG tablet Take 0.5 tablets (12.5 mg total) by mouth once daily. 45 tablet 3    temazepam (RESTORIL) 15 mg Cap TAKE 1  "CAPSULE(15 MG) BY MOUTH EVERY NIGHT AS NEEDED 30 capsule 2    traMADoL (ULTRAM) 50 mg tablet TAKE 1 TABLET BY MOUTH EVERY 6-8 HOURS AS NEEDED FOR PAIN More than 7 day quantity medically necessary 100 tablet 0    vit C,V-Wp-fejar-lutein-zeaxan (PRESERVISION AREDS 2) 784-866-18-1 mg-unit-mg-mg Cap Take 1 each by mouth 2 (two) times a day.       No current facility-administered medications on file prior to visit.       BP (!) 99/55 (BP Location: Left arm, Patient Position: Sitting, BP Method: Medium (Manual))   Pulse 80   Ht 5' 4" (1.626 m)   Wt 56.3 kg (124 lb 0.1 oz)   SpO2 96%   BMI 21.29 kg/m²     Objective:     Physical Exam  Constitutional:       General: She is not in acute distress.     Appearance: Normal appearance. She is well-developed. She is not toxic-appearing or diaphoretic.   HENT:      Head: Normocephalic and atraumatic.      Nose: Nose normal.   Eyes:      General:         Right eye: No discharge.         Left eye: No discharge.      Conjunctiva/sclera:      Right eye: Right conjunctiva is not injected.      Left eye: Left conjunctiva is not injected.      Pupils: Pupils are equal.      Right eye: Pupil is round.      Left eye: Pupil is round.   Neck:      Thyroid: No thyromegaly.      Vascular: No carotid bruit or JVD.   Cardiovascular:      Rate and Rhythm: Normal rate. Rhythm irregularly irregular. No extrasystoles are present.     Chest Wall: PMI is not displaced.      Pulses:           Radial pulses are 2+ on the right side and 2+ on the left side.        Femoral pulses are 2+ on the right side and 2+ on the left side.       Dorsalis pedis pulses are 2+ on the right side and 2+ on the left side.        Posterior tibial pulses are 2+ on the right side and 2+ on the left side.      Heart sounds: S1 normal and S2 normal. Murmur heard.      High-pitched blowing holosystolic murmur is present with a grade of 2/6 at the apex.      No gallop.   Pulmonary:      Effort: Pulmonary effort is normal.    "   Breath sounds: Normal breath sounds.   Abdominal:      Palpations: Abdomen is soft.      Tenderness: There is no abdominal tenderness.   Musculoskeletal:      Cervical back: Neck supple.      Right ankle: No swelling, deformity or ecchymosis.      Left ankle: No swelling, deformity or ecchymosis.   Lymphadenopathy:      Head:      Right side of head: No submandibular adenopathy.      Left side of head: No submandibular adenopathy.      Cervical: No cervical adenopathy.   Skin:     General: Skin is warm and dry.   Neurological:      General: No focal deficit present.      Mental Status: She is alert and oriented to person, place, and time. She is not disoriented.      Cranial Nerves: No cranial nerve deficit.   Psychiatric:         Attention and Perception: Attention and perception normal.         Mood and Affect: Mood and affect normal.         Speech: Speech normal.         Behavior: Behavior normal.         Thought Content: Thought content normal.         Cognition and Memory: Cognition and memory normal.         Judgment: Judgment normal.         Assessment:     1. Permanent atrial fibrillation    2. Chronic anticoagulation    3. Heart failure, diastolic, chronic    4. Nonrheumatic mitral valve regurgitation    5. Pulmonary hypertension due to left heart disease    6. Primary hypertension    7. Migraine without aura and without status migrainosus, not intractable        Plan:     1. Atrial Fibrillation              5/20/2020: Clinical onset of persistent atrial fibrillation.              6/18/2020: Presents with fast VRR. Received diltiazem iv.               [id number] (CHA2Sc).              6/19/2020: Began digoxin.              On metoprolol 100 mg Q12 and digoxin 0.125 mg Q24.              Anticoagulation with apixiban.              6/21/2020: VRR on high side.              6/21/2020: Could possibly consider RAYSA guided CV in the future but likelihood on long term success with rhythm control strategy would  be low. Long discussion with patient about options. 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 We decided on a rate control strategy at least for now which I agree is very reasonable.              6/22/2020: Digoxin 1.0.   7/28/2020: Holter: Atrial fibrillation 94 () bpm. No pauses. 8 VPC's. No symptoms.              On metoprolol 100 mg Q12 and digoxin 0.25 mg Q24.   9/2021: [place]: Admitted with fast VRR. Diltiazem 180 mg Q24 was begun.   10/22/2021: Metoprolol tartrate 100 mg Q12 was changed to metoprolol succinate 100 mg Q12.   10/5/2022: Holter: Atrial fibrillation 80 () bpm. No prolonged pauses. Narrow QRS. Rare VPCs - <1/hr. No reported symptoms.     On metoprolol 100 mg Q12, diltiazem 180 mg Q24 and digoxin 0.25 mg Q24.   Needing 3 drugs for control of rate.              Rate appears well controlled.   She is monitoring her heart rate with a FitBit.   10/2023: Plan Holter x 24 hours.                  2. Chronic Anticoagulation              [id number] (CHA2Sc).              Anticoagulation with apixiban.              On apixiban 2.5 mg Q12.              No aspirin or NSAID.     3. Heart Failure, Diastolic, Chronic              6/19/2020: Echo: Normal left ventricular size with low normal systolic function. EF 50%. Severely dilated LA. Severely dilated RA. Moderate aortic valve sclerosis. Mild to moderate AR. Moderate MR. Moderate TR. SPAP 56 mmHg.               6/18/2020: Presented in HF. Received furosemide 20 mg iv Q12.              6/22/2020: .   4/19/2023: Lisinopril 40 mg Q24 was reduced to lisinopril 10 mg Q24 and spironolactone 25 mg was reduced to spironolactone 12.5 mg Q24 as pressure had been on low side and K 5.2.              On metoprolol 100 mg Q12, diltiazem 180 mg Q24, lisinopril 10 mg Q24, spironolactone 12.5 mg Q24 and furosemide 20 mg Q24.   A SGLT2i would be a reasonable  addition.              Appears well compensated.      4. Mitral Regurgitation              6/19/2020: Echo: Moderate MR.     5. Pulmonary Hypertension              6/19/2020: Echo: SPAP 56 mmHg.               6. Hypertension   2010: Diagnosed.              Used to be on nadolol 40 mg Q12 and lisinopril 40 mg Q24.              On metoprolol 100 mg Q12, diltiazem 180 mg Q24, lisinopril 10 mg Q24, spironolactone 12.5 mg Q24 and furosemide 20 mg Q24.   Not been keeping log at home.   10/19/2023: 99/55 mmHg. Lisinopril 10 mg Q24 to be discontinued.     7. History of Hyponatremia              6/20/2020: Na 128.              Fluid restriction.              6/22/2020: Na 135.              Improving.      8. Migraine               Occasional tramadol.     9. History of Confusion              6/19/2020: Agitation and confusion.              Resolved.     10. Primary Care              Dr. Abran Terrazas.     F/u 3 months.    Donnell Mckeon M.D.      11/20/2023 5:54 PM, Addendum:    11/20/2023: Holter: Atrial fibrillation 81 () bpm. Narrow QRS. No pauses. Very rare VPC's - total of 8 beats. No reported symptoms.    I discussed the above test result and the implications of the findings over the phone.    Donnell Mckeon M.D.

## 2023-10-24 ENCOUNTER — PATIENT MESSAGE (OUTPATIENT)
Dept: CARDIOLOGY | Facility: CLINIC | Age: 83
End: 2023-10-24
Payer: MEDICARE

## 2023-11-13 ENCOUNTER — HOSPITAL ENCOUNTER (OUTPATIENT)
Dept: CARDIOLOGY | Facility: OTHER | Age: 83
Discharge: HOME | End: 2023-11-13
Attending: INTERNAL MEDICINE
Payer: MEDICARE

## 2023-11-13 DIAGNOSIS — I48.21 PERMANENT ATRIAL FIBRILLATION: ICD-10-CM

## 2023-11-13 PROCEDURE — 93227 HOLTER MONITOR - 24 HOUR (CUPID ONLY): ICD-10-PCS | Mod: ,,, | Performed by: INTERNAL MEDICINE

## 2023-11-13 PROCEDURE — 93226 XTRNL ECG REC<48 HR SCAN A/R: CPT

## 2023-11-13 PROCEDURE — 93227 XTRNL ECG REC<48 HR R&I: CPT | Mod: ,,, | Performed by: INTERNAL MEDICINE

## 2024-01-24 ENCOUNTER — OFFICE VISIT (OUTPATIENT)
Dept: CARDIOLOGY | Facility: CLINIC | Age: 84
End: 2024-01-24
Attending: INTERNAL MEDICINE
Payer: MEDICARE

## 2024-01-24 VITALS
OXYGEN SATURATION: 98 % | SYSTOLIC BLOOD PRESSURE: 127 MMHG | WEIGHT: 123 LBS | HEART RATE: 71 BPM | DIASTOLIC BLOOD PRESSURE: 75 MMHG | HEIGHT: 64 IN | BODY MASS INDEX: 21 KG/M2

## 2024-01-24 DIAGNOSIS — I48.21 PERMANENT ATRIAL FIBRILLATION: ICD-10-CM

## 2024-01-24 DIAGNOSIS — I50.32 HEART FAILURE, DIASTOLIC, CHRONIC: ICD-10-CM

## 2024-01-24 DIAGNOSIS — G43.009 MIGRAINE WITHOUT AURA AND WITHOUT STATUS MIGRAINOSUS, NOT INTRACTABLE: ICD-10-CM

## 2024-01-24 DIAGNOSIS — I34.0 NONRHEUMATIC MITRAL VALVE REGURGITATION: ICD-10-CM

## 2024-01-24 DIAGNOSIS — I27.22 PULMONARY HYPERTENSION DUE TO LEFT HEART DISEASE: ICD-10-CM

## 2024-01-24 DIAGNOSIS — Z79.01 CHRONIC ANTICOAGULATION: ICD-10-CM

## 2024-01-24 DIAGNOSIS — I10 PRIMARY HYPERTENSION: ICD-10-CM

## 2024-01-24 PROCEDURE — 99214 OFFICE O/P EST MOD 30 MIN: CPT | Mod: PBBFAC | Performed by: INTERNAL MEDICINE

## 2024-01-24 PROCEDURE — 99999 PR PBB SHADOW E&M-EST. PATIENT-LVL IV: CPT | Mod: PBBFAC,,, | Performed by: INTERNAL MEDICINE

## 2024-01-24 PROCEDURE — 99214 OFFICE O/P EST MOD 30 MIN: CPT | Mod: S$PBB,,, | Performed by: INTERNAL MEDICINE

## 2024-01-24 NOTE — PROGRESS NOTES
Nursing Support:  When: Monday through Friday 7A-5PM except holidays  Where: Our direct line is [phone]  If you are having a true emergency please call 911  In the event that the line is busy or it is after hours please leave a voice message and we will return your call  Please speak clearly, leaving your full name, birth date, best number to reach you and the reason for your call  Medication refills: We will need the name of the medication, the dosage, the ordering provider, whether you get a 30 or 90 day refill, and the pharmacy name and address  Medications will be ordered by the provider only  Nurses cannot call in prescriptions  Please allow 7 days for medication refills  Physician requested updates: If your provider requested that you call with an update after starting medication, please be ready to provide us the medication and dosage, what time you take your medication, the time you attempt to fall asleep, time you fall asleep, when you wake up, and what time you get out of bed  Sleep Study Results: We will contact you with sleep study results and/or next steps after the physician has reviewed your testing  Subjective:     Ellen Neves is a 83 y.o. female with hypertension. She has a healthy weight. On about 5/20/2020 she had nausea and vomited. She felt weak. At that time she also noted that her heart rate had become elevated. She mostly recorded ar heart rate 100-120 bpm. She continued to record an elevated heart rate over the coming weeks. On about 6/15/2020 she felt increasingly weak and began being short of breath. She noted her abdomen had become mildly distended. She saw Dr. Abran Terrazas on 6/18/2020 and was referred to the emergency room at [facility] for admission. She was in atrial fibrillation with a ventricular response rate of about 150 bpm on presentation. She denied any chest pain. She received diltiazem intravenously and metoprolol orally for rate control. Her rate was difficult to control. Digoxin was later begun. On 6/19/2020 she had an echocardiogram that revealed normal left ventricular size with low normal systolic function with ejection fraction of 50%. The left atrium was severely dilated as was the right atrium. There was moderate aortic valve sclerosis, mild to moderate aortic regurgitation and moderate mitral regurgitation. In addition there was moderate tricuspid regurgitation and the systolic pulmonary artery pressure was 56 mmHg. On 6/19/2020 she had a spell of agitation and confusion. She was released for follow up. In 9/2021 she was admitted to [facility] due to fast ventricular response rate even though she had been compliant with her metoprolol and digoxin. Diltiazem was added to her regimen and she was released. On 10/5/2022 she had a holter that revealed atrial fibrillation 80 () bpm. There were no prolonged pauses. The QRS complexes were narrow. There were rare ventricular premature contractions of less than 1/hr. No reported symptoms. On 4/19/2023 the lisinopril 40 mg Q24 was reduced to lisinopril 10 mg Q24 and spironolactone 25 mg was  reduced to spironolactone 12.5 mg Q24 as pressure had been on the side. Her pressure came up. In 2023 she has occasionally felt some dizziness that does not appear related to low pressure readings. She denies any exertional chest pain but has moderate exertional dyspnea. No palpitations or weak spells. No bleeding. No issues with any of her prescribed medications. Feeling well overall.      Atrial Fibrillation  Presents for follow-up visit. Symptoms include hypertension and weakness. Symptoms are negative for bradycardia, chest pain, dizziness, hypotension, palpitations, shortness of breath, syncope and tachycardia. The symptoms have been stable. Past medical history includes CHF and hyperlipidemia.   Congestive Heart Failure  Presents for follow-up visit. Pertinent negatives include no abdominal pain, chest pain, chest pressure, claudication, edema, fatigue, muscle weakness, near-syncope, nocturia, orthopnea, palpitations, paroxysmal nocturnal dyspnea, shortness of breath or unexpected weight change. The symptoms have been stable.   Hypertension  This is a chronic problem. The current episode started more than 1 year ago. The problem is unchanged. The problem is controlled (usually 110-120/70-80 mmHg at home). Pertinent negatives include no anxiety, blurred vision, chest pain, headaches, malaise/fatigue, neck pain, orthopnea, palpitations, peripheral edema, PND, shortness of breath or sweats.   Hyperlipidemia  Pertinent negatives include no chest pain, focal weakness, myalgias or shortness of breath.       Review of Systems   Constitutional: Negative for chills, fatigue, fever, malaise/fatigue and unexpected weight change.   HENT:  Negative for nosebleeds.    Eyes:  Positive for vision loss in left eye and vision loss in right eye. Negative for blurred vision and double vision.   Cardiovascular:  Positive for dyspnea on exertion. Negative for chest pain, claudication, irregular heartbeat, leg swelling,  near-syncope, orthopnea, palpitations, paroxysmal nocturnal dyspnea and syncope.   Respiratory:  Negative for cough, hemoptysis, shortness of breath and wheezing.    Endocrine: Negative for cold intolerance and heat intolerance.   Hematologic/Lymphatic: Negative for bleeding problem. Does not bruise/bleed easily.   Skin:  Negative for color change and rash.   Musculoskeletal:  Negative for back pain, falls, muscle weakness, myalgias and neck pain.   Gastrointestinal:  Negative for abdominal pain, heartburn, hematemesis, hematochezia, hemorrhoids, jaundice, melena, nausea and vomiting.   Genitourinary:  Negative for dysuria, hematuria and nocturia.   Neurological:  Positive for weakness. Negative for dizziness, focal weakness, headaches, light-headedness, loss of balance, numbness and vertigo.   Psychiatric/Behavioral:  Negative for altered mental status, depression and memory loss. The patient is not nervous/anxious.    Allergic/Immunologic: Negative for hives and persistent infections.       Current Outpatient Medications on File Prior to Visit   Medication Sig Dispense Refill    acetaminophen (TYLENOL) 500 MG tablet Take 2 tablets (1,000 mg total) by mouth every 8 (eight) hours as needed for Pain.  0    apixaban (ELIQUIS) 2.5 mg Tab Take 1 tablet (2.5 mg total) by mouth 2 (two) times daily. 180 tablet 3    citalopram (CELEXA) 20 MG tablet Take 1 tablet (20 mg total) by mouth once daily. 90 tablet 3    digoxin (LANOXIN) 250 mcg tablet Take 1 tablet (250 mcg total) by mouth once daily. 90 tablet 3    diltiaZEM (CARDIZEM CD) 180 MG 24 hr capsule Take 1 capsule (180 mg total) by mouth once daily. 90 capsule 3    furosemide (LASIX) 20 MG tablet Take 1 tablet (20 mg total) by mouth once daily. 90 tablet 3    lisinopriL 10 MG tablet Take 1 tablet (10 mg total) by mouth once daily. 90 tablet 3    metoprolol succinate (TOPROL-XL) 100 MG 24 hr tablet Take 1 tablet (100 mg total) by mouth 2 (two) times daily. 180 tablet 3     "ondansetron (ZOFRAN-ODT) 8 MG TbDL DISSOLVE ONE TABLET BY MOUTH EVERY 6 TO 8 HOURS AS NEEDED FOR NAUSEA      spironolactone (ALDACTONE) 25 MG tablet Take 0.5 tablets (12.5 mg total) by mouth once daily. 45 tablet 3    temazepam (RESTORIL) 15 mg Cap TAKE 1 CAPSULE(15 MG) BY MOUTH EVERY NIGHT AS NEEDED 30 capsule 2    traMADoL (ULTRAM) 50 mg tablet TAKE 1 TABLET BY MOUTH EVERY 6-8 HOURS AS NEEDED FOR PAIN More than 7 day quantity medically necessary 100 tablet 0    vit C,Z-Km-lleuo-lutein-zeaxan (PRESERVISION AREDS 2) 550-450-71-1 mg-unit-mg-mg Cap Take 1 each by mouth 2 (two) times a day.       No current facility-administered medications on file prior to visit.       /75   Pulse 71   Ht 5' 4" (1.626 m)   Wt 55.8 kg (123 lb 0.3 oz)   SpO2 98%   BMI 21.12 kg/m²     Objective:     Physical Exam  Constitutional:       General: She is not in acute distress.     Appearance: Normal appearance. She is well-developed. She is not toxic-appearing or diaphoretic.   HENT:      Head: Normocephalic and atraumatic.      Nose: Nose normal.   Eyes:      General:         Right eye: No discharge.         Left eye: No discharge.      Conjunctiva/sclera:      Right eye: Right conjunctiva is not injected.      Left eye: Left conjunctiva is not injected.      Pupils: Pupils are equal.      Right eye: Pupil is round.      Left eye: Pupil is round.   Neck:      Thyroid: No thyromegaly.      Vascular: No carotid bruit or JVD.   Cardiovascular:      Rate and Rhythm: Normal rate. Rhythm irregularly irregular. No extrasystoles are present.     Chest Wall: PMI is not displaced.      Pulses:           Radial pulses are 2+ on the right side and 2+ on the left side.        Femoral pulses are 2+ on the right side and 2+ on the left side.       Dorsalis pedis pulses are 2+ on the right side and 2+ on the left side.        Posterior tibial pulses are 2+ on the right side and 2+ on the left side.      Heart sounds: S1 normal and S2 normal. " Murmur heard.      High-pitched blowing holosystolic murmur is present with a grade of 2/6 at the apex.      No gallop.   Pulmonary:      Effort: Pulmonary effort is normal.      Breath sounds: Normal breath sounds.   Abdominal:      Palpations: Abdomen is soft.      Tenderness: There is no abdominal tenderness.   Musculoskeletal:      Cervical back: Neck supple.      Right ankle: No swelling, deformity or ecchymosis.      Left ankle: No swelling, deformity or ecchymosis.   Lymphadenopathy:      Head:      Right side of head: No submandibular adenopathy.      Left side of head: No submandibular adenopathy.      Cervical: No cervical adenopathy.   Skin:     General: Skin is warm and dry.   Neurological:      General: No focal deficit present.      Mental Status: She is alert and oriented to person, place, and time. She is not disoriented.      Cranial Nerves: No cranial nerve deficit.   Psychiatric:         Attention and Perception: Attention and perception normal.         Mood and Affect: Mood and affect normal.         Speech: Speech normal.         Behavior: Behavior normal.         Thought Content: Thought content normal.         Cognition and Memory: Cognition and memory normal.         Judgment: Judgment normal.         Assessment:     1. Permanent atrial fibrillation    2. Chronic anticoagulation    3. Heart failure, diastolic, chronic    4. Nonrheumatic mitral valve regurgitation    5. Pulmonary hypertension due to left heart disease    6. Primary hypertension    7. Migraine without aura and without status migrainosus, not intractable        Plan:     1. Atrial Fibrillation              5/20/2020: Clinical onset of persistent atrial fibrillation.              6/18/2020: Presents with fast VRR. Received diltiazem iv.               [id number] (CHA2Sc).              6/19/2020: Began digoxin.              On metoprolol 100 mg Q12 and digoxin 0.125 mg Q24.              Anticoagulation with apixiban.               6/21/2020: VRR on high side.              6/21/2020: Could possibly consider RAYSA guided CV in the future but likelihood on long term success with rhythm control strategy would be low. Long discussion with patient about options. 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 We decided on a rate control strategy at least for now which I agree is very reasonable.              6/22/2020: Digoxin 1.0.   7/28/2020: Holter: Atrial fibrillation 94 () bpm. No pauses. 8 VPC's. No symptoms.              On metoprolol 100 mg Q12 and digoxin 0.25 mg Q24.   9/2021: [place], [place]: Admitted with fast VRR. Diltiazem 180 mg Q24 was begun.   10/22/2021: Metoprolol tartrate 100 mg Q12 was changed to metoprolol succinate 100 mg Q12.   10/5/2022: Holter: Atrial fibrillation 80 () bpm. No prolonged pauses. Narrow QRS. Rare VPCs - <1/hr. No reported symptoms.     11/20/2023: Holter: Atrial fibrillation 81 () bpm. Narrow QRS. No pauses. Very rare VPC's - total of 8 beats. No reported symptoms.   On metoprolol 100 mg Q12, diltiazem 180 mg Q24 and digoxin 0.25 mg Q24.   Needing 3 drugs for control of rate.              Rate appears well controlled.   She is monitoring her heart rate with a FitBit.   10/2024: Plan next holter x 24 hours.                  2. Chronic Anticoagulation              [id number] (CHA2Sc).              Anticoagulation with apixiban.              On apixiban 2.5 mg Q12.              No aspirin or NSAID.     3. Heart Failure, Diastolic, Chronic              6/19/2020: Echo: Normal left ventricular size with low normal systolic function. EF 50%. Severely dilated LA. Severely dilated RA. Moderate aortic valve sclerosis. Mild to moderate AR. Moderate MR. Moderate TR. SPAP 56 mmHg.               6/18/2020: Presented in HF. Received furosemide 20 mg iv Q12.              6/22/2020: .   4/19/2023: Lisinopril 40 mg  Q24 was reduced to lisinopril 10 mg Q24 and spironolactone 25 mg was reduced to spironolactone 12.5 mg Q24 as pressure had been on low side and K 5.2.              On metoprolol 100 mg Q12, diltiazem 180 mg Q24, lisinopril 10 mg Q24, spironolactone 12.5 mg Q24 and furosemide 20 mg Q24.   A SGLT2i would be a reasonable addition.              Appears well compensated.      4. Mitral Regurgitation              6/19/2020: Echo: Moderate MR.     5. Pulmonary Hypertension              6/19/2020: Echo: SPAP 56 mmHg.               6. Hypertension   2010: Diagnosed.              Used to be on nadolol 40 mg Q12 and lisinopril 40 mg Q24.              On metoprolol 100 mg Q12, diltiazem 180 mg Q24, lisinopril 10 mg Q24, spironolactone 12.5 mg Q24 and furosemide 20 mg Q24.   Keeping log at home.   Well controlled.      7. History of Hyponatremia              6/20/2020: Na 128.              Fluid restriction.              6/22/2020: Na 135.              Improving.      8. Migraine               Occasional tramadol.     9. History of Confusion              6/19/2020: Agitation and confusion.              Resolved.     10. Primary Care              Dr. Abran Terrazas.     F/u 4 months.    Donnell Mckeon M.D.

## 2024-03-12 DIAGNOSIS — I50.32 HEART FAILURE, DIASTOLIC, CHRONIC: ICD-10-CM

## 2024-03-12 RX ORDER — SPIRONOLACTONE 25 MG/1
25 TABLET ORAL DAILY
Qty: 90 TABLET | Refills: 3 | Status: SHIPPED | OUTPATIENT
Start: 2024-03-12 | End: 2024-04-18 | Stop reason: SDUPTHER

## 2024-04-18 ENCOUNTER — OFFICE VISIT (OUTPATIENT)
Dept: CARDIOLOGY | Facility: CLINIC | Age: 84
End: 2024-04-18
Attending: INTERNAL MEDICINE
Payer: MEDICARE

## 2024-04-18 VITALS
HEIGHT: 64 IN | OXYGEN SATURATION: 96 % | WEIGHT: 123.38 LBS | SYSTOLIC BLOOD PRESSURE: 98 MMHG | DIASTOLIC BLOOD PRESSURE: 56 MMHG | BODY MASS INDEX: 21.06 KG/M2 | HEART RATE: 71 BPM

## 2024-04-18 DIAGNOSIS — I34.0 NONRHEUMATIC MITRAL VALVE REGURGITATION: ICD-10-CM

## 2024-04-18 DIAGNOSIS — I10 PRIMARY HYPERTENSION: ICD-10-CM

## 2024-04-18 DIAGNOSIS — I50.32 HEART FAILURE, DIASTOLIC, CHRONIC: ICD-10-CM

## 2024-04-18 DIAGNOSIS — Z79.01 CHRONIC ANTICOAGULATION: ICD-10-CM

## 2024-04-18 DIAGNOSIS — I27.22 PULMONARY HYPERTENSION DUE TO LEFT HEART DISEASE: ICD-10-CM

## 2024-04-18 DIAGNOSIS — I48.21 PERMANENT ATRIAL FIBRILLATION: ICD-10-CM

## 2024-04-18 PROCEDURE — 99999 PR PBB SHADOW E&M-EST. PATIENT-LVL III: CPT | Mod: PBBFAC,,, | Performed by: INTERNAL MEDICINE

## 2024-04-18 PROCEDURE — 99213 OFFICE O/P EST LOW 20 MIN: CPT | Mod: PBBFAC | Performed by: INTERNAL MEDICINE

## 2024-04-18 PROCEDURE — 99214 OFFICE O/P EST MOD 30 MIN: CPT | Mod: S$PBB,,, | Performed by: INTERNAL MEDICINE

## 2024-04-18 RX ORDER — DILTIAZEM HYDROCHLORIDE 180 MG/1
180 CAPSULE, COATED, EXTENDED RELEASE ORAL DAILY
Qty: 90 CAPSULE | Refills: 3 | Status: SHIPPED | OUTPATIENT
Start: 2024-04-18

## 2024-04-18 RX ORDER — DIGOXIN 250 MCG
250 TABLET ORAL DAILY
Qty: 90 TABLET | Refills: 3 | Status: SHIPPED | OUTPATIENT
Start: 2024-04-18

## 2024-04-18 RX ORDER — LISINOPRIL 10 MG/1
10 TABLET ORAL DAILY
Qty: 90 TABLET | Refills: 3 | Status: SHIPPED | OUTPATIENT
Start: 2024-04-18

## 2024-04-18 RX ORDER — FUROSEMIDE 20 MG/1
20 TABLET ORAL DAILY
Qty: 120 TABLET | Refills: 3 | Status: SHIPPED | OUTPATIENT
Start: 2024-04-18

## 2024-04-18 RX ORDER — METOPROLOL SUCCINATE 100 MG/1
100 TABLET, EXTENDED RELEASE ORAL 2 TIMES DAILY
Qty: 180 TABLET | Refills: 3 | Status: SHIPPED | OUTPATIENT
Start: 2024-04-18

## 2024-04-18 RX ORDER — SPIRONOLACTONE 25 MG/1
12.5 TABLET ORAL DAILY
Qty: 45 TABLET | Refills: 3 | Status: SHIPPED | OUTPATIENT
Start: 2024-04-18

## 2024-04-18 NOTE — PROGRESS NOTES
Subjective:     Ellen Neves is a 84 y.o. female with hypertension. She has a healthy weight. On about 5/20/2020 she had nausea and vomited. She felt weak. At that time she also noted that her heart rate had become elevated. She mostly recorded ar heart rate 100-120 bpm. She continued to record an elevated heart rate over the coming weeks. On about 6/15/2020 she felt increasingly weak and began being short of breath. She noted her abdomen had become mildly distended. She saw Dr. Abran Terrazas on 6/18/2020 and was referred to the emergency room at [facility] for admission. She was in atrial fibrillation with a ventricular response rate of about 150 bpm on presentation. She denied any chest pain. She received diltiazem intravenously and metoprolol orally for rate control. Her rate was difficult to control. Digoxin was later begun. On 6/19/2020 she had an echocardiogram that revealed normal left ventricular size with low normal systolic function with ejection fraction of 50%. The left atrium was severely dilated as was the right atrium. There was moderate aortic valve sclerosis, mild to moderate aortic regurgitation and moderate mitral regurgitation. In addition there was moderate tricuspid regurgitation and the systolic pulmonary artery pressure was 56 mmHg. On 6/19/2020 she had a spell of agitation and confusion. She was released for follow up. In 9/2021 she was admitted to [facility] due to fast ventricular response rate even though she had been compliant with her metoprolol and digoxin. Diltiazem was added to her regimen and she was released. On 10/5/2022 she had a holter that revealed atrial fibrillation 80 () bpm. There were no prolonged pauses. The QRS complexes were narrow. There were rare ventricular premature contractions of less than 1/hr. No reported symptoms. On 4/19/2023 the lisinopril 40 mg Q24 was reduced to lisinopril 10 mg Q24 and spironolactone 25 mg was  reduced to spironolactone 12.5 mg Q24 as pressure had been on the side. Her pressure came up. In 2023 she occasionally felt some dizziness that did not appear related to low pressure readings. She denies any exertional chest pain but has moderate exertional dyspnea. No palpitations or weak spells. No bleeding. No issues with any of her prescribed medications. Feeling well overall.      Atrial Fibrillation  Presents for follow-up visit. Symptoms include hypertension and weakness. Symptoms are negative for bradycardia, chest pain, dizziness, hypotension, palpitations, shortness of breath, syncope and tachycardia. The symptoms have been stable. Past medical history includes CHF and hyperlipidemia.   Congestive Heart Failure  Presents for follow-up visit. Pertinent negatives include no abdominal pain, chest pain, chest pressure, claudication, edema, fatigue, muscle weakness, near-syncope, nocturia, orthopnea, palpitations, paroxysmal nocturnal dyspnea, shortness of breath or unexpected weight change. The symptoms have been stable.   Hypertension  This is a chronic problem. The current episode started more than 1 year ago. The problem is unchanged. The problem is controlled (usually 110-120/70-80 mmHg at home). Pertinent negatives include no anxiety, blurred vision, chest pain, headaches, malaise/fatigue, neck pain, orthopnea, palpitations, peripheral edema, PND, shortness of breath or sweats.   Hyperlipidemia  Pertinent negatives include no chest pain, focal weakness, myalgias or shortness of breath.       Review of Systems   Constitutional: Negative for chills, fatigue, fever, malaise/fatigue and unexpected weight change.   HENT:  Negative for nosebleeds.    Eyes:  Positive for vision loss in left eye and vision loss in right eye. Negative for blurred vision and double vision.   Cardiovascular:  Positive for dyspnea on exertion. Negative for chest pain, claudication, irregular heartbeat, leg swelling, near-syncope,  orthopnea, palpitations, paroxysmal nocturnal dyspnea and syncope.   Respiratory:  Negative for cough, hemoptysis, shortness of breath and wheezing.    Endocrine: Negative for cold intolerance and heat intolerance.   Hematologic/Lymphatic: Negative for bleeding problem. Does not bruise/bleed easily.   Skin:  Negative for color change and rash.   Musculoskeletal:  Negative for back pain, falls, muscle weakness, myalgias and neck pain.   Gastrointestinal:  Negative for abdominal pain, heartburn, hematemesis, hematochezia, hemorrhoids, jaundice, melena, nausea and vomiting.   Genitourinary:  Negative for dysuria, hematuria and nocturia.   Neurological:  Positive for weakness. Negative for dizziness, focal weakness, headaches, light-headedness, loss of balance, numbness and vertigo.   Psychiatric/Behavioral:  Negative for altered mental status, depression and memory loss. The patient is not nervous/anxious.    Allergic/Immunologic: Negative for hives and persistent infections.       Current Outpatient Medications on File Prior to Visit   Medication Sig Dispense Refill    acetaminophen (TYLENOL) 500 MG tablet Take 2 tablets (1,000 mg total) by mouth every 8 (eight) hours as needed for Pain.  0    apixaban (ELIQUIS) 2.5 mg Tab Take 1 tablet (2.5 mg total) by mouth 2 (two) times daily. 180 tablet 3    citalopram (CELEXA) 20 MG tablet Take 1 tablet (20 mg total) by mouth once daily. 90 tablet 3    digoxin (LANOXIN) 250 mcg tablet Take 1 tablet (250 mcg total) by mouth once daily. 90 tablet 3    diltiaZEM (CARDIZEM CD) 180 MG 24 hr capsule Take 1 capsule (180 mg total) by mouth once daily. 90 capsule 3    furosemide (LASIX) 20 MG tablet Take 1 tablet (20 mg total) by mouth once daily. 90 tablet 3    lisinopriL 10 MG tablet Take 1 tablet (10 mg total) by mouth once daily. 90 tablet 3    metoprolol succinate (TOPROL-XL) 100 MG 24 hr tablet Take 1 tablet (100 mg total) by mouth 2 (two) times daily. 180 tablet 3    ondansetron  "(ZOFRAN-ODT) 8 MG TbDL DISSOLVE ONE TABLET BY MOUTH EVERY 6 TO 8 HOURS AS NEEDED FOR NAUSEA      spironolactone (ALDACTONE) 25 MG tablet Take 1 tablet (25 mg total) by mouth once daily. 90 tablet 3    temazepam (RESTORIL) 15 mg Cap TAKE 1 CAPSULE(15 MG) BY MOUTH EVERY NIGHT AS NEEDED 30 capsule 2    traMADoL (ULTRAM) 50 mg tablet TAKE 1 TABLET BY MOUTH EVERY 6-8 HOURS AS NEEDED FOR PAIN More than 7 day quantity medically necessary 100 tablet 0    vit C,M-Pg-hwojg-lutein-zeaxan (PRESERVISION AREDS 2) 606-255-32-1 mg-unit-mg-mg Cap Take 1 each by mouth 2 (two) times a day.       No current facility-administered medications on file prior to visit.       BP (!) 98/56   Pulse 71   Ht 5' 4" (1.626 m)   Wt 56 kg (123 lb 5.6 oz)   SpO2 96%   BMI 21.17 kg/m²     Objective:     Physical Exam  Constitutional:       General: She is not in acute distress.     Appearance: Normal appearance. She is well-developed. She is not toxic-appearing or diaphoretic.   HENT:      Head: Normocephalic and atraumatic.      Nose: Nose normal.   Eyes:      General:         Right eye: No discharge.         Left eye: No discharge.      Conjunctiva/sclera:      Right eye: Right conjunctiva is not injected.      Left eye: Left conjunctiva is not injected.      Pupils: Pupils are equal.      Right eye: Pupil is round.      Left eye: Pupil is round.   Neck:      Thyroid: No thyromegaly.      Vascular: No carotid bruit or JVD.   Cardiovascular:      Rate and Rhythm: Normal rate. Rhythm irregularly irregular. No extrasystoles are present.     Chest Wall: PMI is not displaced.      Pulses:           Radial pulses are 2+ on the right side and 2+ on the left side.        Femoral pulses are 2+ on the right side and 2+ on the left side.       Dorsalis pedis pulses are 2+ on the right side and 2+ on the left side.        Posterior tibial pulses are 2+ on the right side and 2+ on the left side.      Heart sounds: S1 normal and S2 normal. Murmur heard.      " High-pitched blowing holosystolic murmur is present with a grade of 2/6 at the apex.      No gallop.   Pulmonary:      Effort: Pulmonary effort is normal.      Breath sounds: Normal breath sounds.   Abdominal:      Palpations: Abdomen is soft.      Tenderness: There is no abdominal tenderness.   Musculoskeletal:      Cervical back: Neck supple.      Right ankle: No swelling, deformity or ecchymosis.      Left ankle: No swelling, deformity or ecchymosis.   Lymphadenopathy:      Head:      Right side of head: No submandibular adenopathy.      Left side of head: No submandibular adenopathy.      Cervical: No cervical adenopathy.   Skin:     General: Skin is warm and dry.   Neurological:      General: No focal deficit present.      Mental Status: She is alert and oriented to person, place, and time. She is not disoriented.      Cranial Nerves: No cranial nerve deficit.   Psychiatric:         Attention and Perception: Attention and perception normal.         Mood and Affect: Mood and affect normal.         Speech: Speech normal.         Behavior: Behavior normal.         Thought Content: Thought content normal.         Cognition and Memory: Cognition and memory normal.         Judgment: Judgment normal.         Assessment:     1. Heart failure, diastolic, chronic    2. Permanent atrial fibrillation    3. Chronic anticoagulation    4. Nonrheumatic mitral valve regurgitation    5. Pulmonary hypertension due to left heart disease    6. Primary hypertension        Plan:     1. Heart Failure, Diastolic, Chronic              6/19/2020: Echo: Normal left ventricular size with low normal systolic function. EF 50%. Severely dilated LA. Severely dilated RA. Moderate aortic valve sclerosis. Mild to moderate AR. Moderate MR. Moderate TR. SPAP 56 mmHg.               6/18/2020: Presented in HF. Received furosemide 20 mg iv Q12.              6/22/2020: .   4/19/2023: Lisinopril 40 mg Q24 was reduced to lisinopril 10 mg Q24 and  spironolactone 25 mg was reduced to spironolactone 12.5 mg Q24 as pressure had been on low side and K 5.2.              On metoprolol 100 mg Q12, diltiazem 180 mg Q24, lisinopril 10 mg Q24, spironolactone 12.5 mg Q24 and furosemide 20 mg Q24.   A SGLT2i would be a reasonable addition.              Appears well compensated.    2. Atrial Fibrillation              5/20/2020: Clinical onset of persistent atrial fibrillation.              6/18/2020: Presents with fast VRR. Received diltiazem iv.               [id number] (CHA2Sc).              6/19/2020: Began digoxin.              On metoprolol 100 mg Q12 and digoxin 0.125 mg Q24.              Anticoagulation with apixiban.              6/21/2020: VRR on high side.              6/21/2020: Could possibly consider RAYSA guided CV in the future but likelihood on long term success with rhythm control strategy would be low. Long discussion with patient about options. 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 We decided on a rate control strategy at least for now which I agree is very reasonable.              6/22/2020: Digoxin 1.0.   7/28/2020: Holter: Atrial fibrillation 94 () bpm. No pauses. 8 VPC's. No symptoms.              On metoprolol 100 mg Q12 and digoxin 0.25 mg Q24.   9/2021: [place], [place]: Admitted with fast VRR. Diltiazem 180 mg Q24 was begun.   10/22/2021: Metoprolol tartrate 100 mg Q12 was changed to metoprolol succinate 100 mg Q12.   10/5/2022: Holter: Atrial fibrillation 80 () bpm. No prolonged pauses. Narrow QRS. Rare VPCs - <1/hr. No reported symptoms.     11/20/2023: Holter: Atrial fibrillation 81 () bpm. Narrow QRS. No pauses. Very rare VPC's - total of 8 beats. No reported symptoms.   On metoprolol 100 mg Q12, diltiazem 180 mg Q24 and digoxin 0.25 mg Q24.   Needing 3 drugs for control of rate.              Rate appears well controlled.   She is  monitoring her heart rate with a FitBit and B/P cuff.   10/2024: Plan next holter x 24 hours.                  3. Chronic Anticoagulation              [id number] (CHA2Sc).              Anticoagulation with apixiban.              On apixiban 2.5 mg Q12.              No aspirin or NSAID.     4. Mitral Regurgitation              6/19/2020: Echo: Moderate MR.     5. Pulmonary Hypertension              6/19/2020: Echo: SPAP 56 mmHg.               6. Hypertension   2010: Diagnosed.              Used to be on nadolol 40 mg Q12 and lisinopril 40 mg Q24.              On metoprolol 100 mg Q12, diltiazem 180 mg Q24, lisinopril 10 mg Q24, spironolactone 12.5 mg Q24 and furosemide 20 mg Q24.   Keeping log at home.   Well controlled.      7. History of Hyponatremia              6/20/2020: Na 128.              Fluid restriction.              6/22/2020: Na 135.              Improving.      8. Migraine               Occasional tramadol.     9. History of Confusion              6/19/2020: Agitation and confusion.              Resolved.     10. Primary Care              Dr. Abran Terrazas.     F/u 4 months.    Donnell Mckeon M.D.

## 2024-04-21 DIAGNOSIS — I50.32 HEART FAILURE, DIASTOLIC, CHRONIC: ICD-10-CM

## 2024-04-22 RX ORDER — SPIRONOLACTONE 25 MG/1
12.5 TABLET ORAL
Qty: 45 TABLET | Refills: 3 | OUTPATIENT
Start: 2024-04-22

## 2024-08-06 ENCOUNTER — OFFICE VISIT (OUTPATIENT)
Dept: CARDIOLOGY | Facility: CLINIC | Age: 84
End: 2024-08-06
Attending: INTERNAL MEDICINE
Payer: MEDICARE

## 2024-08-06 VITALS
BODY MASS INDEX: 20.79 KG/M2 | WEIGHT: 121.81 LBS | HEART RATE: 81 BPM | HEIGHT: 64 IN | OXYGEN SATURATION: 93 % | DIASTOLIC BLOOD PRESSURE: 67 MMHG | SYSTOLIC BLOOD PRESSURE: 147 MMHG

## 2024-08-06 DIAGNOSIS — I48.21 PERMANENT ATRIAL FIBRILLATION: ICD-10-CM

## 2024-08-06 DIAGNOSIS — I27.22 PULMONARY HYPERTENSION DUE TO LEFT HEART DISEASE: ICD-10-CM

## 2024-08-06 DIAGNOSIS — Z79.01 CHRONIC ANTICOAGULATION: ICD-10-CM

## 2024-08-06 DIAGNOSIS — I10 PRIMARY HYPERTENSION: ICD-10-CM

## 2024-08-06 DIAGNOSIS — I34.0 NONRHEUMATIC MITRAL VALVE REGURGITATION: ICD-10-CM

## 2024-08-06 DIAGNOSIS — G43.009 MIGRAINE WITHOUT AURA AND WITHOUT STATUS MIGRAINOSUS, NOT INTRACTABLE: ICD-10-CM

## 2024-08-06 DIAGNOSIS — I50.32 HEART FAILURE, DIASTOLIC, CHRONIC: ICD-10-CM

## 2024-08-06 PROCEDURE — 99214 OFFICE O/P EST MOD 30 MIN: CPT | Mod: S$PBB,,, | Performed by: INTERNAL MEDICINE

## 2024-08-06 PROCEDURE — 99999 PR PBB SHADOW E&M-EST. PATIENT-LVL IV: CPT | Mod: PBBFAC,,, | Performed by: INTERNAL MEDICINE

## 2024-08-06 PROCEDURE — 99214 OFFICE O/P EST MOD 30 MIN: CPT | Mod: PBBFAC | Performed by: INTERNAL MEDICINE

## 2024-10-07 ENCOUNTER — PATIENT MESSAGE (OUTPATIENT)
Dept: CARDIOLOGY | Facility: CLINIC | Age: 84
End: 2024-10-07
Payer: MEDICARE

## 2024-10-08 ENCOUNTER — HOSPITAL ENCOUNTER (OUTPATIENT)
Dept: RADIOLOGY | Facility: OTHER | Age: 84
Discharge: HOME | End: 2024-10-08
Attending: INTERNAL MEDICINE
Payer: MEDICARE

## 2024-10-08 DIAGNOSIS — Z12.31 SCREENING MAMMOGRAM FOR BREAST CANCER: ICD-10-CM

## 2024-10-08 PROCEDURE — 77067 SCR MAMMO BI INCL CAD: CPT | Mod: TC

## 2024-10-21 ENCOUNTER — HOSPITAL ENCOUNTER (OUTPATIENT)
Dept: CARDIOLOGY | Facility: OTHER | Age: 84
Discharge: HOME | End: 2024-10-21
Attending: INTERNAL MEDICINE
Payer: MEDICARE

## 2024-10-21 DIAGNOSIS — I48.21 PERMANENT ATRIAL FIBRILLATION: ICD-10-CM

## 2024-10-21 PROCEDURE — 93226 XTRNL ECG REC<48 HR SCAN A/R: CPT

## 2024-11-21 ENCOUNTER — OFFICE VISIT (OUTPATIENT)
Dept: CARDIOLOGY | Facility: CLINIC | Age: 84
End: 2024-11-21
Attending: INTERNAL MEDICINE
Payer: MEDICARE

## 2024-11-21 VITALS
BODY MASS INDEX: 21.24 KG/M2 | DIASTOLIC BLOOD PRESSURE: 59 MMHG | HEART RATE: 88 BPM | OXYGEN SATURATION: 95 % | SYSTOLIC BLOOD PRESSURE: 123 MMHG | WEIGHT: 124.44 LBS | HEIGHT: 64 IN

## 2024-11-21 DIAGNOSIS — G43.009 MIGRAINE WITHOUT AURA AND WITHOUT STATUS MIGRAINOSUS, NOT INTRACTABLE: ICD-10-CM

## 2024-11-21 DIAGNOSIS — I34.0 NONRHEUMATIC MITRAL VALVE REGURGITATION: ICD-10-CM

## 2024-11-21 DIAGNOSIS — I50.32 HEART FAILURE, DIASTOLIC, CHRONIC: ICD-10-CM

## 2024-11-21 DIAGNOSIS — I27.22 PULMONARY HYPERTENSION DUE TO LEFT HEART DISEASE: ICD-10-CM

## 2024-11-21 DIAGNOSIS — I48.21 PERMANENT ATRIAL FIBRILLATION: ICD-10-CM

## 2024-11-21 DIAGNOSIS — I10 PRIMARY HYPERTENSION: ICD-10-CM

## 2024-11-21 DIAGNOSIS — Z79.01 CHRONIC ANTICOAGULATION: ICD-10-CM

## 2024-11-21 PROCEDURE — 99214 OFFICE O/P EST MOD 30 MIN: CPT | Mod: PBBFAC | Performed by: INTERNAL MEDICINE

## 2024-11-21 PROCEDURE — 99999 PR PBB SHADOW E&M-EST. PATIENT-LVL IV: CPT | Mod: PBBFAC,,, | Performed by: INTERNAL MEDICINE

## 2024-11-21 PROCEDURE — 99214 OFFICE O/P EST MOD 30 MIN: CPT | Mod: S$PBB,,, | Performed by: INTERNAL MEDICINE

## 2024-11-21 NOTE — PROGRESS NOTES
Subjective:     Ellen Neves is a 84 y.o. female with hypertension. She has a healthy weight. On about 5/20/2020 she had nausea and vomited. She felt weak. At that time she also noted that her heart rate had become elevated. She mostly recorded ar heart rate 100-120 bpm. She continued to record an elevated heart rate over the coming weeks. On about 6/15/2020 she felt increasingly weak and began being short of breath. She noted her abdomen had become mildly distended. She saw Dr. Abran Terrazas on 6/18/2020 and was referred to the emergency room at [facility] for admission. She was in atrial fibrillation with a ventricular response rate of about 150 bpm on presentation. She denied any chest pain. She received diltiazem intravenously and metoprolol orally for rate control. Her rate was difficult to control. Digoxin was later begun. On 6/19/2020 she had an echocardiogram that revealed normal left ventricular size with low normal systolic function with ejection fraction of 50%. The left atrium was severely dilated as was the right atrium. There was moderate aortic valve sclerosis, mild to moderate aortic regurgitation and moderate mitral regurgitation. In addition there was moderate tricuspid regurgitation and the systolic pulmonary artery pressure was 56 mmHg. On 6/19/2020 she had a spell of agitation and confusion. She was released for follow up. In 9/2021 she was admitted to [facility] due to fast ventricular response rate even though she had been compliant with her metoprolol and digoxin. Diltiazem was added to her regimen and she was released. On 10/5/2022 she had a holter that revealed atrial fibrillation 80 () bpm. There were no prolonged pauses. The QRS complexes were narrow. There were rare ventricular premature contractions of less than 1/hr. No reported symptoms. On 4/19/2023 the lisinopril 40 mg Q24 was reduced to lisinopril 10 mg Q24 and spironolactone 25 mg was  reduced to spironolactone 12.5 mg Q24 as pressure had been on the side. Her pressure came up. In 2023 she occasionally felt some dizziness that did not appear related to low pressure readings. On 7/3/2024 she fell and hit her chest. She suffered a few broken ribs. She denies any exertional chest pain but has moderate exertional dyspnea. No palpitations or weak spells. No bleeding. No issues with any of her prescribed medications. Feeling well overall.      Atrial Fibrillation  Presents for follow-up visit. Symptoms include hypertension and weakness. Symptoms are negative for bradycardia, chest pain, dizziness, hypotension, palpitations, shortness of breath, syncope and tachycardia. The symptoms have been stable.   Congestive Heart Failure  Presents for follow-up visit. Pertinent negatives include no abdominal pain, chest pain, chest pressure, claudication, edema, fatigue, muscle weakness, near-syncope, nocturia, orthopnea, palpitations, paroxysmal nocturnal dyspnea, shortness of breath or unexpected weight change. The symptoms have been stable.   Hypertension  This is a chronic problem. The current episode started more than 1 year ago. The problem is unchanged. The problem is controlled (usually 110-120/70-80 mmHg at home). Pertinent negatives include no anxiety, blurred vision, chest pain, headaches, malaise/fatigue, neck pain, orthopnea, palpitations, peripheral edema, PND, shortness of breath or sweats.   Hyperlipidemia  Pertinent negatives include no chest pain, focal weakness, myalgias or shortness of breath.       Review of Systems   Constitutional: Negative for chills, fatigue, fever, malaise/fatigue and unexpected weight change.   HENT:  Negative for nosebleeds.    Eyes:  Positive for vision loss in left eye and vision loss in right eye. Negative for blurred vision and double vision.   Cardiovascular:  Positive for dyspnea on exertion. Negative for chest pain, claudication, irregular heartbeat, leg swelling,  near-syncope, orthopnea, palpitations, paroxysmal nocturnal dyspnea and syncope.   Respiratory:  Negative for cough, hemoptysis, shortness of breath and wheezing.    Endocrine: Negative for cold intolerance and heat intolerance.   Hematologic/Lymphatic: Negative for bleeding problem. Does not bruise/bleed easily.   Skin:  Negative for color change and rash.   Musculoskeletal:  Negative for back pain, falls, muscle weakness, myalgias and neck pain.   Gastrointestinal:  Negative for abdominal pain, heartburn, hematemesis, hematochezia, hemorrhoids, jaundice, melena, nausea and vomiting.   Genitourinary:  Negative for dysuria, hematuria and nocturia.   Neurological:  Positive for weakness. Negative for dizziness, focal weakness, headaches, light-headedness, loss of balance, numbness and vertigo.   Psychiatric/Behavioral:  Negative for altered mental status, depression and memory loss. The patient is not nervous/anxious.    Allergic/Immunologic: Negative for hives and persistent infections.       Current Outpatient Medications on File Prior to Visit   Medication Sig Dispense Refill    acetaminophen (TYLENOL) 500 MG tablet Take 2 tablets (1,000 mg total) by mouth every 8 (eight) hours as needed for Pain.  0    apixaban (ELIQUIS) 2.5 mg Tab Take 1 tablet (2.5 mg total) by mouth 2 (two) times daily. 180 tablet 3    citalopram (CELEXA) 20 MG tablet Take 1 tablet (20 mg total) by mouth once daily. 90 tablet 3    digoxin (LANOXIN) 250 mcg tablet Take 1 tablet (250 mcg total) by mouth once daily. 90 tablet 3    diltiaZEM (CARDIZEM CD) 180 MG 24 hr capsule Take 1 capsule (180 mg total) by mouth once daily. 90 capsule 3    eszopiclone (LUNESTA) 2 MG Tab Take 1 tablet (2 mg total) by mouth every evening. 30 tablet 0    furosemide (LASIX) 20 MG tablet Take 1 tablet (20 mg total) by mouth once daily. 120 tablet 3    lisinopriL 10 MG tablet Take 1 tablet (10 mg total) by mouth once daily. 90 tablet 3    metoprolol succinate  "(TOPROL-XL) 100 MG 24 hr tablet Take 1 tablet (100 mg total) by mouth 2 (two) times daily. 180 tablet 3    ondansetron (ZOFRAN-ODT) 8 MG TbDL DISSOLVE ONE TABLET BY MOUTH EVERY 6 TO 8 HOURS AS NEEDED FOR NAUSEA      spironolactone (ALDACTONE) 25 MG tablet Take 0.5 tablets (12.5 mg total) by mouth once daily. 45 tablet 3    traMADoL (ULTRAM) 50 mg tablet Take 1 tablet (50 mg total) by mouth every 12 (twelve) hours as needed for Pain (severe pain. use sparingly.). TAKE 1 TABLET BY MOUTH EVERY 6-8 HOURS AS NEEDED FOR PAIN More than 7 day quantity medically necessary 90 tablet 0    vit C,Q-Dk-zrqlt-lutein-zeaxan (PRESERVISION AREDS 2) 807-957-24-1 mg-unit-mg-mg Cap Take 1 each by mouth 2 (two) times a day.      LIDOcaine (LIDODERM) 5 % Place 1 patch onto the skin once daily. (Patient not taking: Reported on 11/21/2024)      metoprolol tartrate (LOPRESSOR) 100 MG tablet Take 100 mg by mouth 2 (two) times daily. (Patient not taking: Reported on 11/21/2024)      naloxegoL (MOVANTIK) 25 mg tablet Take 25 mg by mouth once daily. (Patient not taking: Reported on 11/21/2024)      temazepam (RESTORIL) 15 mg Cap TAKE 1 CAPSULE(15 MG) BY MOUTH EVERY NIGHT AS NEEDED (Patient not taking: Reported on 11/21/2024) 30 capsule 0     No current facility-administered medications on file prior to visit.       BP (!) 123/59   Pulse 88   Ht 5' 4" (1.626 m)   Wt 56.4 kg (124 lb 7.2 oz)   SpO2 95%   BMI 21.36 kg/m²     Objective:     Physical Exam  Constitutional:       General: She is not in acute distress.     Appearance: Normal appearance. She is well-developed. She is not toxic-appearing or diaphoretic.   HENT:      Head: Normocephalic and atraumatic.      Nose: Nose normal.   Eyes:      General:         Right eye: No discharge.         Left eye: No discharge.      Conjunctiva/sclera:      Right eye: Right conjunctiva is not injected.      Left eye: Left conjunctiva is not injected.      Pupils: Pupils are equal.      Right eye: Pupil " is round.      Left eye: Pupil is round.   Neck:      Thyroid: No thyromegaly.      Vascular: No carotid bruit or JVD.   Cardiovascular:      Rate and Rhythm: Normal rate. Rhythm irregularly irregular. No extrasystoles are present.     Chest Wall: PMI is not displaced.      Pulses:           Radial pulses are 2+ on the right side and 2+ on the left side.        Femoral pulses are 2+ on the right side and 2+ on the left side.       Dorsalis pedis pulses are 2+ on the right side and 2+ on the left side.        Posterior tibial pulses are 2+ on the right side and 2+ on the left side.      Heart sounds: S1 normal and S2 normal. Murmur heard.      High-pitched blowing holosystolic murmur is present with a grade of 2/6 at the apex.      No gallop.   Pulmonary:      Effort: Pulmonary effort is normal.      Breath sounds: Normal breath sounds.   Abdominal:      Palpations: Abdomen is soft.      Tenderness: There is no abdominal tenderness.   Musculoskeletal:      Cervical back: Neck supple.      Right ankle: No swelling, deformity or ecchymosis.      Left ankle: No swelling, deformity or ecchymosis.   Lymphadenopathy:      Head:      Right side of head: No submandibular adenopathy.      Left side of head: No submandibular adenopathy.      Cervical: No cervical adenopathy.   Skin:     General: Skin is warm and dry.   Neurological:      General: No focal deficit present.      Mental Status: She is alert and oriented to person, place, and time. She is not disoriented.      Cranial Nerves: No cranial nerve deficit.   Psychiatric:         Attention and Perception: Attention and perception normal.         Mood and Affect: Mood and affect normal.         Speech: Speech normal.         Behavior: Behavior normal.         Thought Content: Thought content normal.         Cognition and Memory: Cognition and memory normal.         Judgment: Judgment normal.         Assessment:     1. Heart failure, diastolic, chronic    2. Permanent  atrial fibrillation    3. Chronic anticoagulation    4. Nonrheumatic mitral valve regurgitation    5. Pulmonary hypertension due to left heart disease    6. Primary hypertension    7. Migraine without aura and without status migrainosus, not intractable        Plan:     1. Heart Failure, Diastolic, Chronic              6/19/2020: Echo: Normal left ventricular size with low normal systolic function. EF 50%. Severely dilated LA. Severely dilated RA. Moderate aortic valve sclerosis. Mild to moderate AR. Moderate MR. Moderate TR. SPAP 56 mmHg.               6/18/2020: Presented in HF. Received furosemide 20 mg iv Q12.              6/22/2020: .   4/19/2023: Lisinopril 40 mg Q24 was reduced to lisinopril 10 mg Q24 and spironolactone 25 mg was reduced to spironolactone 12.5 mg Q24 as pressure had been on low side and K 5.2.              On metoprolol 100 mg Q12, diltiazem 180 mg Q24, lisinopril 10 mg Q24, spironolactone 12.5 mg Q24 and furosemide 20 mg Q24.   A SGLT2i would be a reasonable addition.              Appears well compensated.    2. Atrial Fibrillation              5/20/2020: Clinical onset of persistent atrial fibrillation.              6/18/2020: Presents with fast VRR. Received diltiazem iv.               [id number] (CHA2Sc).              6/19/2020: Began digoxin.              On metoprolol 100 mg Q12 and digoxin 0.125 mg Q24.              Anticoagulation with apixiban.              6/21/2020: VRR on high side.              6/21/2020: Could possibly consider RAYSA guided CV in the future but likelihood on long term success with rhythm control strategy would be low. Long discussion with patient about options. 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 We decided on a rate control strategy at least for now which I agree is very reasonable.              6/22/2020: Digoxin 1.0.   7/28/2020: Holter: Atrial fibrillation 94  () bpm. No pauses. 8 VPC's. No symptoms.              On metoprolol 100 mg Q12 and digoxin 0.25 mg Q24.   9/2021: [place]: Admitted with fast VRR. Diltiazem 180 mg Q24 was begun.   10/22/2021: Metoprolol tartrate 100 mg Q12 was changed to metoprolol succinate 100 mg Q12.   10/5/2022: Holter: Atrial fibrillation 80 () bpm. No prolonged pauses. Narrow QRS. Rare VPCs - <1/hr. No reported symptoms.     11/20/2023: Holter: Atrial fibrillation 81 () bpm. Narrow QRS. No pauses. Very rare VPC's - total of 8 beats. No reported symptoms.   10/29/2024: Holter: Atrial fibrillation 82 () bpm. Narrow QRS. No pauses. Rare VPCs - 4/hr. No reported symptoms.   On metoprolol 100 mg Q12, diltiazem 180 mg Q24 and digoxin 0.25 mg Q24.   Needing 3 drugs for control of rate.              Rate appears well controlled.   She is monitoring her heart rate with a FitBit and B/P cuff.   10/2025: Plan next holter x 24 hours.                  3. Chronic Anticoagulation              [id number] (CHA2Sc).              Anticoagulation with apixiban.              On apixiban 2.5 mg Q12.              No aspirin or NSAID.     4. Mitral Regurgitation              6/19/2020: Echo: Moderate MR.     5. Pulmonary Hypertension              6/19/2020: Echo: SPAP 56 mmHg.               6. Hypertension   2010: Diagnosed.              Used to be on nadolol 40 mg Q12 and lisinopril 40 mg Q24.              On metoprolol 100 mg Q12, diltiazem 180 mg Q24, lisinopril 10 mg Q24, spironolactone 12.5 mg Q24 and furosemide 20 mg Q24.   Keeping log at home.   Well controlled.      7. History of Hyponatremia              6/20/2020: Na 128.              Fluid restriction.              6/22/2020: Na 135.              Improving.      8. Migraine               Occasional tramadol.     9. History of Confusion              6/19/2020: Agitation and confusion.              Resolved.     10. Primary Care              Dr. Abran Terrazas.      F/u 4 months.    Donnell Mckeon M.D.

## 2025-02-25 ENCOUNTER — PATIENT MESSAGE (OUTPATIENT)
Dept: CARDIOLOGY | Facility: CLINIC | Age: 85
End: 2025-02-25
Payer: MEDICARE

## 2025-02-25 DIAGNOSIS — I50.32 HEART FAILURE, DIASTOLIC, CHRONIC: Primary | ICD-10-CM

## 2025-03-11 ENCOUNTER — PATIENT MESSAGE (OUTPATIENT)
Dept: CARDIOLOGY | Facility: CLINIC | Age: 85
End: 2025-03-11
Payer: MEDICARE

## 2025-03-11 DIAGNOSIS — I50.32 HEART FAILURE, DIASTOLIC, CHRONIC: ICD-10-CM

## 2025-03-12 DIAGNOSIS — I50.32 HEART FAILURE, DIASTOLIC, CHRONIC: ICD-10-CM

## 2025-03-12 RX ORDER — SPIRONOLACTONE 25 MG/1
12.5 TABLET ORAL DAILY
Qty: 45 TABLET | Refills: 3 | Status: SHIPPED | OUTPATIENT
Start: 2025-03-12

## 2025-03-12 RX ORDER — FUROSEMIDE 20 MG/1
20 TABLET ORAL DAILY
Qty: 120 TABLET | Refills: 3 | Status: SHIPPED | OUTPATIENT
Start: 2025-03-12

## 2025-03-20 ENCOUNTER — OFFICE VISIT (OUTPATIENT)
Dept: CARDIOLOGY | Facility: CLINIC | Age: 85
End: 2025-03-20
Attending: INTERNAL MEDICINE
Payer: MEDICARE

## 2025-03-20 VITALS
OXYGEN SATURATION: 97 % | BODY MASS INDEX: 21.17 KG/M2 | SYSTOLIC BLOOD PRESSURE: 110 MMHG | DIASTOLIC BLOOD PRESSURE: 58 MMHG | WEIGHT: 124 LBS | HEIGHT: 64 IN | HEART RATE: 56 BPM

## 2025-03-20 DIAGNOSIS — I50.32 HEART FAILURE, DIASTOLIC, CHRONIC: ICD-10-CM

## 2025-03-20 DIAGNOSIS — I34.0 NONRHEUMATIC MITRAL VALVE REGURGITATION: ICD-10-CM

## 2025-03-20 DIAGNOSIS — I10 PRIMARY HYPERTENSION: ICD-10-CM

## 2025-03-20 DIAGNOSIS — I48.21 PERMANENT ATRIAL FIBRILLATION: ICD-10-CM

## 2025-03-20 DIAGNOSIS — I27.22 PULMONARY HYPERTENSION DUE TO LEFT HEART DISEASE: ICD-10-CM

## 2025-03-20 DIAGNOSIS — Z79.01 CHRONIC ANTICOAGULATION: ICD-10-CM

## 2025-03-20 PROCEDURE — 99214 OFFICE O/P EST MOD 30 MIN: CPT | Mod: S$PBB,,, | Performed by: INTERNAL MEDICINE

## 2025-03-20 PROCEDURE — 99999 PR PBB SHADOW E&M-EST. PATIENT-LVL III: CPT | Mod: PBBFAC,,, | Performed by: INTERNAL MEDICINE

## 2025-03-20 PROCEDURE — 99213 OFFICE O/P EST LOW 20 MIN: CPT | Mod: PBBFAC | Performed by: INTERNAL MEDICINE

## 2025-03-20 RX ORDER — METOPROLOL SUCCINATE 100 MG/1
100 TABLET, EXTENDED RELEASE ORAL 2 TIMES DAILY
Qty: 180 TABLET | Refills: 3 | Status: SHIPPED | OUTPATIENT
Start: 2025-03-20

## 2025-03-20 RX ORDER — DIGOXIN 250 MCG
250 TABLET ORAL DAILY
Qty: 90 TABLET | Refills: 3 | Status: SHIPPED | OUTPATIENT
Start: 2025-03-20

## 2025-03-20 RX ORDER — LISINOPRIL 10 MG/1
10 TABLET ORAL DAILY
Qty: 90 TABLET | Refills: 3 | Status: SHIPPED | OUTPATIENT
Start: 2025-03-20

## 2025-03-20 RX ORDER — FUROSEMIDE 40 MG/1
40 TABLET ORAL DAILY
Qty: 120 TABLET | Refills: 3 | Status: SHIPPED | OUTPATIENT
Start: 2025-03-20

## 2025-03-20 RX ORDER — SPIRONOLACTONE 25 MG/1
25 TABLET ORAL DAILY
Qty: 120 TABLET | Refills: 3 | Status: SHIPPED | OUTPATIENT
Start: 2025-03-20

## 2025-03-20 RX ORDER — DILTIAZEM HYDROCHLORIDE 180 MG/1
180 CAPSULE, COATED, EXTENDED RELEASE ORAL DAILY
Qty: 90 CAPSULE | Refills: 3 | Status: SHIPPED | OUTPATIENT
Start: 2025-03-20

## 2025-03-20 NOTE — PROGRESS NOTES
Subjective:     Ellen Neves is a 85 y.o. female with hypertension. She has a healthy weight. On about 5/20/2020 she had nausea and vomited. She felt weak. At that time she also noted that her heart rate had become elevated. She mostly recorded ar heart rate 100-120 bpm. She continued to record an elevated heart rate over the coming weeks. On about 6/15/2020 she felt increasingly weak and began being short of breath. She noted her abdomen had become mildly distended. She saw Dr. Abran Terrazas on 6/18/2020 and was referred to the emergency room at [facility] for admission. She was in atrial fibrillation with a ventricular response rate of about 150 bpm on presentation. She denied any chest pain. She received diltiazem intravenously and metoprolol orally for rate control. Her rate was difficult to control. Digoxin was later begun. On 6/19/2020 she had an echocardiogram that revealed normal left ventricular size with low normal systolic function with ejection fraction of 50%. The left atrium was severely dilated as was the right atrium. There was moderate aortic valve sclerosis, mild to moderate aortic regurgitation and moderate mitral regurgitation. In addition there was moderate tricuspid regurgitation and the systolic pulmonary artery pressure was 56 mmHg. On 6/19/2020 she had a spell of agitation and confusion. She was released for follow up. In 9/2021 she was admitted to [facility] due to fast ventricular response rate even though she had been compliant with her metoprolol and digoxin. Diltiazem was added to her regimen and she was released. On 10/5/2022 she had a holter that revealed atrial fibrillation 80 () bpm. There were no prolonged pauses. The QRS complexes were narrow. There were rare ventricular premature contractions of less than 1/hr. No reported symptoms. On 4/19/2023 the lisinopril 40 mg Q24 was reduced to lisinopril 10 mg Q24 and spironolactone 25 mg was  reduced to spironolactone 12.5 mg Q24 as pressure had been on the side. Her pressure came up. In 2023 she occasionally felt some dizziness that did not appear related to low pressure readings. On 7/3/2024 she fell and hit her chest. She suffered a few broken ribs. She denies any exertional chest pain but has moderate exertional dyspnea. In 2025 she increased the dose of furosemide and spironolactone due to abdominal distention that then improved. No palpitations or weak spells. No bleeding. No issues with any of her prescribed medications. Feeling well overall.      Atrial Fibrillation  Presents for follow-up visit. Symptoms include hypertension and weakness. Symptoms are negative for bradycardia, chest pain, dizziness, hypotension, palpitations, shortness of breath, syncope and tachycardia. The symptoms have been stable.   Congestive Heart Failure  Presents for follow-up visit. Pertinent negatives include no abdominal pain, chest pain, chest pressure, claudication, edema, fatigue, muscle weakness, near-syncope, nocturia, orthopnea, palpitations, paroxysmal nocturnal dyspnea, shortness of breath or unexpected weight change. The symptoms have been stable.   Hypertension  This is a chronic problem. The current episode started more than 1 year ago. The problem is unchanged. The problem is controlled (usually 110-120/70-80 mmHg at home). Pertinent negatives include no anxiety, blurred vision, chest pain, headaches, malaise/fatigue, neck pain, orthopnea, palpitations, peripheral edema, PND, shortness of breath or sweats.   Hyperlipidemia  Pertinent negatives include no chest pain, focal weakness, myalgias or shortness of breath.       Review of Systems   Constitutional: Negative for chills, fatigue, fever, malaise/fatigue and unexpected weight change.   HENT:  Negative for nosebleeds.    Eyes:  Positive for vision loss in left eye and vision loss in right eye. Negative for blurred vision and double vision.    Cardiovascular:  Positive for dyspnea on exertion. Negative for chest pain, claudication, irregular heartbeat, leg swelling, near-syncope, orthopnea, palpitations, paroxysmal nocturnal dyspnea and syncope.   Respiratory:  Negative for cough, hemoptysis, shortness of breath and wheezing.    Endocrine: Negative for cold intolerance and heat intolerance.   Hematologic/Lymphatic: Negative for bleeding problem. Does not bruise/bleed easily.   Skin:  Negative for color change and rash.   Musculoskeletal:  Negative for back pain, falls, muscle weakness, myalgias and neck pain.   Gastrointestinal:  Negative for abdominal pain, heartburn, hematemesis, hematochezia, hemorrhoids, jaundice, melena, nausea and vomiting.   Genitourinary:  Negative for dysuria, hematuria and nocturia.   Neurological:  Positive for weakness. Negative for dizziness, focal weakness, headaches, light-headedness, loss of balance, numbness and vertigo.   Psychiatric/Behavioral:  Negative for altered mental status, depression and memory loss. The patient is not nervous/anxious.    Allergic/Immunologic: Negative for hives and persistent infections.       Current Outpatient Medications on File Prior to Visit   Medication Sig Dispense Refill    acetaminophen (TYLENOL) 500 MG tablet Take 2 tablets (1,000 mg total) by mouth every 8 (eight) hours as needed for Pain.  0    apixaban (ELIQUIS) 2.5 mg Tab Take 1 tablet (2.5 mg total) by mouth 2 (two) times daily. 180 tablet 3    citalopram (CELEXA) 20 MG tablet TAKE 1 TABLET DAILY 90 tablet 3    digoxin (LANOXIN) 250 mcg tablet Take 1 tablet (250 mcg total) by mouth once daily. 90 tablet 3    diltiaZEM (CARDIZEM CD) 180 MG 24 hr capsule Take 1 capsule (180 mg total) by mouth once daily. 90 capsule 3    eszopiclone (LUNESTA) 2 MG Tab Take 1 tablet (2 mg total) by mouth every evening. 30 tablet 0    furosemide (LASIX) 20 MG tablet Take 1 tablet (20 mg total) by mouth once daily. (Patient taking differently: Take  "40 mg by mouth once daily.) 120 tablet 3    lisinopriL 10 MG tablet Take 1 tablet (10 mg total) by mouth once daily. 90 tablet 3    metoprolol succinate (TOPROL-XL) 100 MG 24 hr tablet Take 1 tablet (100 mg total) by mouth 2 (two) times daily. 180 tablet 3    ondansetron (ZOFRAN-ODT) 8 MG TbDL DISSOLVE ONE TABLET BY MOUTH EVERY 6 TO 8 HOURS AS NEEDED FOR NAUSEA      spironolactone (ALDACTONE) 25 MG tablet Take 0.5 tablets (12.5 mg total) by mouth once daily. (Patient taking differently: Take 25 mg by mouth once daily.) 45 tablet 3    traMADoL (ULTRAM) 50 mg tablet Take 1 tablet (50 mg total) by mouth every 12 (twelve) hours as needed for Pain (severe pain. use sparingly.). TAKE 1 TABLET BY MOUTH EVERY 6-8 HOURS AS NEEDED FOR PAIN More than 7 day quantity medically necessary 90 tablet 0    vit C,A-Qq-giyuf-lutein-zeaxan (PRESERVISION AREDS 2) [id number] mg-unit-mg-mg Cap Take 1 each by mouth 2 (two) times a day.      LIDOcaine (LIDODERM) 5 % Place 1 patch onto the skin once daily. (Patient not taking: Reported on 3/20/2025)      naloxegoL (MOVANTIK) 25 mg tablet Take 25 mg by mouth once daily. (Patient not taking: Reported on 3/20/2025)      temazepam (RESTORIL) 15 mg Cap TAKE 1 CAPSULE(15 MG) BY MOUTH EVERY NIGHT AS NEEDED (Patient not taking: Reported on 3/20/2025) 30 capsule 0     No current facility-administered medications on file prior to visit.       BP (!) 110/58   Pulse (!) 56   Ht 5' 4" (1.626 m)   Wt 56.3 kg (124 lb 0.1 oz)   SpO2 97%   BMI 21.29 kg/m²     Objective:     Physical Exam  Constitutional:       General: She is not in acute distress.     Appearance: Normal appearance. She is well-developed. She is not toxic-appearing or diaphoretic.   HENT:      Head: Normocephalic and atraumatic.      Nose: Nose normal.   Eyes:      General:         Right eye: No discharge.         Left eye: No discharge.      Conjunctiva/sclera:      Right eye: Right conjunctiva is not injected.      Left eye: Left " conjunctiva is not injected.      Pupils: Pupils are equal.      Right eye: Pupil is round.      Left eye: Pupil is round.   Neck:      Thyroid: No thyromegaly.      Vascular: No carotid bruit or JVD.   Cardiovascular:      Rate and Rhythm: Normal rate. Rhythm irregularly irregular. No extrasystoles are present.     Chest Wall: PMI is not displaced.      Pulses:           Radial pulses are 2+ on the right side and 2+ on the left side.        Femoral pulses are 2+ on the right side and 2+ on the left side.       Dorsalis pedis pulses are 2+ on the right side and 2+ on the left side.        Posterior tibial pulses are 2+ on the right side and 2+ on the left side.      Heart sounds: S1 normal and S2 normal. Murmur heard.      High-pitched blowing holosystolic murmur is present with a grade of 2/6 at the apex.      No gallop.   Pulmonary:      Effort: Pulmonary effort is normal.      Breath sounds: Normal breath sounds.   Abdominal:      Palpations: Abdomen is soft.      Tenderness: There is no abdominal tenderness.   Musculoskeletal:      Cervical back: Neck supple.      Right ankle: No swelling, deformity or ecchymosis.      Left ankle: No swelling, deformity or ecchymosis.   Lymphadenopathy:      Head:      Right side of head: No submandibular adenopathy.      Left side of head: No submandibular adenopathy.      Cervical: No cervical adenopathy.   Skin:     General: Skin is warm and dry.   Neurological:      General: No focal deficit present.      Mental Status: She is alert and oriented to person, place, and time. She is not disoriented.      Cranial Nerves: No cranial nerve deficit.   Psychiatric:         Attention and Perception: Attention and perception normal.         Mood and Affect: Mood and affect normal.         Speech: Speech normal.         Behavior: Behavior normal.         Thought Content: Thought content normal.         Cognition and Memory: Cognition and memory normal.         Judgment: Judgment  normal.         Assessment:     1. Heart failure, diastolic, chronic    2. Permanent atrial fibrillation    3. Chronic anticoagulation    4. Nonrheumatic mitral valve regurgitation    5. Pulmonary hypertension due to left heart disease    6. Primary hypertension        Plan:     1. Heart Failure, Diastolic, Chronic              6/19/2020: Echo: Normal left ventricular size with low normal systolic function. EF 50%. Severely dilated LA. Severely dilated RA. Moderate aortic valve sclerosis. Mild to moderate AR. Moderate MR. Moderate TR. SPAP 56 mmHg.               6/18/2020: Presented in HF. Received furosemide 20 mg iv Q12.              6/22/2020: .   4/19/2023: Lisinopril 40 mg Q24 was reduced to lisinopril 10 mg Q24 and spironolactone 25 mg was reduced to spironolactone 12.5 mg Q24 as pressure had been on low side and K 5.2. In 2025 the dose was increased due to mild abdominal distention.                On metoprolol 100 mg Q12, diltiazem 180 mg Q24, lisinopril 10 mg Q24, spironolactone 25 mg Q24 and furosemide 40 mg Q24.   Mat take an additional spironolactone 25 mg Q24 and furosemide 40 mg Q24 PRN if edema.   A SGLT2i would be a reasonable addition.              Appears well compensated.    2. Atrial Fibrillation              5/20/2020: Clinical onset of persistent atrial fibrillation.              6/18/2020: Presents with fast VRR. Received diltiazem iv.               [id number] (CHA2Sc).              6/19/2020: Began digoxin.              On metoprolol 100 mg Q12 and digoxin 0.125 mg Q24.              Anticoagulation with apixiban.              6/21/2020: VRR on high side.              6/21/2020: Could possibly consider RAYSA guided CV in the future but likelihood on long term success with rhythm control strategy would be low. Long discussion with patient about options. 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 We decided on  a rate control strategy at least for now which I agree is very reasonable.              6/22/2020: Digoxin 1.0.   7/28/2020: Holter: Atrial fibrillation 94 () bpm. No pauses. 8 VPC's. No symptoms.              On metoprolol 100 mg Q12 and digoxin 0.25 mg Q24.   9/2021: [facility] [place], [place]: Admitted with fast VRR. Diltiazem 180 mg Q24 was begun.   10/22/2021: Metoprolol tartrate 100 mg Q12 was changed to metoprolol succinate 100 mg Q12.   10/5/2022: Holter: Atrial fibrillation 80 () bpm. No prolonged pauses. Narrow QRS. Rare VPCs - <1/hr. No reported symptoms.     11/20/2023: Holter: Atrial fibrillation 81 () bpm. Narrow QRS. No pauses. Very rare VPC's - total of 8 beats. No reported symptoms.   10/29/2024: Holter: Atrial fibrillation 82 () bpm. Narrow QRS. No pauses. Rare VPCs - 4/hr. No reported symptoms.   On metoprolol 100 mg Q12, diltiazem 180 mg Q24 and digoxin 0.25 mg Q24.   Needing 3 drugs for control of rate.              Rate appears well controlled.   She is monitoring her heart rate with a FitBit and B/P cuff.   10/2025: Plan next holter x 24 hours.                  3. Chronic Anticoagulation              [id number] (CHA2Sc).              Anticoagulation with apixiban.              On apixiban 2.5 mg Q12.              No aspirin or NSAID.     4. Mitral Regurgitation              6/19/2020: Echo: Moderate MR.     5. Pulmonary Hypertension              6/19/2020: Echo: SPAP 56 mmHg.               6. Hypertension   2010: Diagnosed.              Used to be on nadolol 40 mg Q12 and lisinopril 40 mg Q24.              On metoprolol 100 mg Q12, diltiazem 180 mg Q24, lisinopril 10 mg Q24, spironolactone 25 mg Q24 and furosemide 40 mg Q24.   Keeping log at home.   Well controlled.      7. History of Hyponatremia              6/20/2020: Na 128.              Fluid restriction.              6/22/2020: Na 135.              Improving.      8. Migraine               Occasional tramadol.      9. History of Confusion              6/19/2020: Agitation and confusion.              Resolved.     10. Primary Care              Dr. Abran Terrazas.     F/u 4 months.    Donnell Mckeon M.D.       [facility]

## 2025-05-29 ENCOUNTER — PATIENT MESSAGE (OUTPATIENT)
Dept: CARDIOLOGY | Facility: CLINIC | Age: 85
End: 2025-05-29
Payer: MEDICARE

## 2025-05-30 DIAGNOSIS — I50.32 HEART FAILURE, DIASTOLIC, CHRONIC: ICD-10-CM

## 2025-05-30 RX ORDER — FUROSEMIDE 40 MG/1
40 TABLET ORAL 2 TIMES DAILY
Qty: 180 TABLET | Refills: 3 | Status: SHIPPED | OUTPATIENT
Start: 2025-05-30

## 2025-05-30 RX ORDER — SPIRONOLACTONE 25 MG/1
25 TABLET ORAL 2 TIMES DAILY
Qty: 180 TABLET | Refills: 3 | Status: SHIPPED | OUTPATIENT
Start: 2025-05-30

## 2025-06-09 DIAGNOSIS — I50.32 HEART FAILURE, DIASTOLIC, CHRONIC: ICD-10-CM

## 2025-06-09 RX ORDER — FUROSEMIDE 40 MG/1
40 TABLET ORAL 2 TIMES DAILY
Qty: 180 TABLET | Refills: 3 | Status: SHIPPED | OUTPATIENT
Start: 2025-06-09

## 2025-06-09 RX ORDER — SPIRONOLACTONE 25 MG/1
25 TABLET ORAL 2 TIMES DAILY
Qty: 180 TABLET | Refills: 3 | Status: SHIPPED | OUTPATIENT
Start: 2025-06-09

## 2025-06-10 ENCOUNTER — PATIENT MESSAGE (OUTPATIENT)
Dept: CARDIOLOGY | Facility: CLINIC | Age: 85
End: 2025-06-10
Payer: MEDICARE

## 2025-06-19 ENCOUNTER — OFFICE VISIT (OUTPATIENT)
Dept: CARDIOLOGY | Facility: CLINIC | Age: 85
End: 2025-06-19
Attending: INTERNAL MEDICINE
Payer: MEDICARE

## 2025-06-19 VITALS
BODY MASS INDEX: 20.28 KG/M2 | WEIGHT: 118.81 LBS | OXYGEN SATURATION: 98 % | HEART RATE: 83 BPM | HEIGHT: 64 IN | DIASTOLIC BLOOD PRESSURE: 74 MMHG | SYSTOLIC BLOOD PRESSURE: 146 MMHG

## 2025-06-19 DIAGNOSIS — I48.21 PERMANENT ATRIAL FIBRILLATION: ICD-10-CM

## 2025-06-19 DIAGNOSIS — I50.32 HEART FAILURE, DIASTOLIC, CHRONIC: ICD-10-CM

## 2025-06-19 DIAGNOSIS — I34.0 NONRHEUMATIC MITRAL VALVE REGURGITATION: ICD-10-CM

## 2025-06-19 DIAGNOSIS — I27.22 PULMONARY HYPERTENSION DUE TO LEFT HEART DISEASE: ICD-10-CM

## 2025-06-19 DIAGNOSIS — I10 PRIMARY HYPERTENSION: ICD-10-CM

## 2025-06-19 DIAGNOSIS — Z79.01 CHRONIC ANTICOAGULATION: ICD-10-CM

## 2025-06-19 LAB
OHS QRS DURATION: 88 MS
OHS QTC CALCULATION: 400 MS

## 2025-06-19 PROCEDURE — 99213 OFFICE O/P EST LOW 20 MIN: CPT | Mod: PBBFAC | Performed by: INTERNAL MEDICINE

## 2025-06-19 PROCEDURE — 99999 PR PBB SHADOW E&M-EST. PATIENT-LVL III: CPT | Mod: PBBFAC,,, | Performed by: INTERNAL MEDICINE

## 2025-06-19 RX ORDER — DIGOXIN 250 MCG
250 TABLET ORAL DAILY
Qty: 90 TABLET | Refills: 3 | Status: SHIPPED | OUTPATIENT
Start: 2025-06-19

## 2025-06-19 RX ORDER — DILTIAZEM HYDROCHLORIDE 180 MG/1
180 CAPSULE, COATED, EXTENDED RELEASE ORAL DAILY
Qty: 90 CAPSULE | Refills: 3 | Status: SHIPPED | OUTPATIENT
Start: 2025-06-19

## 2025-06-19 RX ORDER — METOPROLOL SUCCINATE 100 MG/1
100 TABLET, EXTENDED RELEASE ORAL 2 TIMES DAILY
Qty: 180 TABLET | Refills: 3 | Status: SHIPPED | OUTPATIENT
Start: 2025-06-19

## 2025-06-19 RX ORDER — AMOXICILLIN 500 MG/1
500 CAPSULE ORAL EVERY 6 HOURS
COMMUNITY
Start: 2025-06-11

## 2025-06-19 RX ORDER — SPIRONOLACTONE 25 MG/1
25 TABLET ORAL 2 TIMES DAILY
Qty: 180 TABLET | Refills: 3 | Status: SHIPPED | OUTPATIENT
Start: 2025-06-19

## 2025-06-19 RX ORDER — FUROSEMIDE 40 MG/1
40 TABLET ORAL 2 TIMES DAILY
Qty: 180 TABLET | Refills: 3 | Status: SHIPPED | OUTPATIENT
Start: 2025-06-19

## 2025-06-19 NOTE — PROGRESS NOTES
Subjective:     Ellen Neves is a 85 y.o. female with hypertension. She has a healthy weight. On about 5/20/2020 she had nausea and vomited. She felt weak. At that time she also noted that her heart rate had become elevated. She mostly recorded ar heart rate 100-120 bpm. She continued to record an elevated heart rate over the coming weeks. On about 6/15/2020 she felt increasingly weak and began being short of breath. She noted her abdomen had become mildly distended. She saw Dr. Abran Terrazas on 6/18/2020 and was referred to the emergency room at [facility] for admission. She was in atrial fibrillation with a ventricular response rate of about 150 bpm on presentation. She denied any chest pain. She received diltiazem intravenously and metoprolol orally for rate control. Her rate was difficult to control. Digoxin was later begun. On 6/19/2020 she had an echocardiogram that revealed normal left ventricular size with low normal systolic function with ejection fraction of 50%. The left atrium was severely dilated as was the right atrium. There was moderate aortic valve sclerosis, mild to moderate aortic regurgitation and moderate mitral regurgitation. In addition there was moderate tricuspid regurgitation and the systolic pulmonary artery pressure was 56 mmHg. On 6/19/2020 she had a spell of agitation and confusion. She was released for follow up. In 9/2021 she was admitted to [facility] due to fast ventricular response rate even though she had been compliant with her metoprolol and digoxin. Diltiazem was added to her regimen and she was released. On 10/5/2022 she had a holter that revealed atrial fibrillation 80 () bpm. There were no prolonged pauses. The QRS complexes were narrow. There were rare ventricular premature contractions of less than 1/hr. No reported symptoms. On 4/19/2023 the lisinopril 40 mg Q24 was reduced to lisinopril 10 mg Q24 and spironolactone 25 mg was  reduced to spironolactone 12.5 mg Q24 as pressure had been on the side. Her pressure came up. In 2023 she occasionally felt some dizziness that did not appear related to low pressure readings. On 7/3/2024 she fell and hit her chest. She suffered a few broken ribs. She denies any exertional chest pain but has moderate exertional dyspnea. In 2025 she increased the dose of furosemide and spironolactone due to abdominal distention that then improved. No palpitations or weak spells. No bleeding. No issues with any of her prescribed medications. Feeling fair overall.    Atrial Fibrillation  Presents for follow-up visit. Symptoms include hypertension and weakness. Symptoms are negative for bradycardia, chest pain, dizziness, hypotension, palpitations, shortness of breath, syncope and tachycardia. The symptoms have been stable.   Congestive Heart Failure  Presents for follow-up visit. Pertinent negatives include no abdominal pain, chest pain, chest pressure, claudication, edema, fatigue, muscle weakness, near-syncope, nocturia, orthopnea, palpitations, paroxysmal nocturnal dyspnea, shortness of breath or unexpected weight change. The symptoms have been stable.   Hypertension  This is a chronic problem. The current episode started more than 1 year ago. The problem is unchanged. The problem is controlled (usually 110-120/70-80 mmHg at home). Pertinent negatives include no anxiety, blurred vision, chest pain, headaches, malaise/fatigue, neck pain, orthopnea, palpitations, peripheral edema, PND, shortness of breath or sweats.   Hyperlipidemia  Pertinent negatives include no chest pain, focal weakness, myalgias or shortness of breath.     Review of Systems   Constitutional: Negative for chills, fatigue, fever, malaise/fatigue and unexpected weight change.   HENT:  Negative for nosebleeds.    Eyes:  Positive for vision loss in left eye and vision loss in right eye. Negative for blurred vision and double vision.   Cardiovascular:   Positive for dyspnea on exertion. Negative for chest pain, claudication, irregular heartbeat, leg swelling, near-syncope, orthopnea, palpitations, paroxysmal nocturnal dyspnea and syncope.   Respiratory:  Negative for cough, hemoptysis, shortness of breath and wheezing.    Endocrine: Negative for cold intolerance and heat intolerance.   Hematologic/Lymphatic: Negative for bleeding problem. Does not bruise/bleed easily.   Skin:  Negative for color change and rash.   Musculoskeletal:  Negative for back pain, falls, muscle weakness, myalgias and neck pain.   Gastrointestinal:  Negative for abdominal pain, heartburn, hematemesis, hematochezia, hemorrhoids, jaundice, melena, nausea and vomiting.   Genitourinary:  Negative for dysuria, hematuria and nocturia.   Neurological:  Positive for weakness. Negative for dizziness, focal weakness, headaches, light-headedness, loss of balance, numbness and vertigo.   Psychiatric/Behavioral:  Negative for altered mental status, depression and memory loss. The patient is not nervous/anxious.    Allergic/Immunologic: Negative for hives and persistent infections.     Current Outpatient Medications on File Prior to Visit   Medication Sig Dispense Refill    acetaminophen (TYLENOL) 500 MG tablet Take 2 tablets (1,000 mg total) by mouth every 8 (eight) hours as needed for Pain.  0    amoxicillin (AMOXIL) 500 MG capsule Take 500 mg by mouth every 6 (six) hours.      apixaban (ELIQUIS) 2.5 mg Tab Take 1 tablet (2.5 mg total) by mouth 2 (two) times daily. 180 tablet 3    citalopram (CELEXA) 20 MG tablet TAKE 1 TABLET DAILY 90 tablet 3    digoxin (LANOXIN) 250 mcg tablet Take 1 tablet (250 mcg total) by mouth once daily. 90 tablet 3    diltiaZEM (CARDIZEM CD) 180 MG 24 hr capsule Take 1 capsule (180 mg total) by mouth once daily. 90 capsule 3    eszopiclone (LUNESTA) 2 MG Tab Take 1 tablet (2 mg total) by mouth every evening. 30 tablet 0    furosemide (LASIX) 40 MG tablet Take 1 tablet (40 mg  "total) by mouth 2 (two) times daily. 180 tablet 3    metoprolol succinate (TOPROL-XL) 100 MG 24 hr tablet Take 1 tablet (100 mg total) by mouth 2 (two) times daily. 180 tablet 3    spironolactone (ALDACTONE) 25 MG tablet Take 1 tablet (25 mg total) by mouth 2 (two) times daily. 180 tablet 3    traMADoL (ULTRAM) 50 mg tablet Take 1 tablet (50 mg total) by mouth every 8 (eight) hours as needed for Pain (severe pain. use sparingly.). TAKE 1 TABLET BY MOUTH EVERY 6-8 HOURS AS NEEDED FOR PAIN More than 7 day quantity medically necessary 90 tablet 0    vit C,C-Ev-tjupo-lutein-zeaxan (PRESERVISION AREDS 2) [id number] mg-unit-mg-mg Cap Take 1 each by mouth 2 (two) times a day.      lisinopriL 10 MG tablet Take 1 tablet (10 mg total) by mouth once daily. (Patient not taking: Reported on 6/19/2025) 90 tablet 3    ondansetron (ZOFRAN-ODT) 8 MG TbDL DISSOLVE ONE TABLET BY MOUTH EVERY 6 TO 8 HOURS AS NEEDED FOR NAUSEA      temazepam (RESTORIL) 15 mg Cap TAKE 1 CAPSULE(15 MG) BY MOUTH EVERY NIGHT AS NEEDED (Patient not taking: Reported on 11/21/2024) 30 capsule 0     No current facility-administered medications on file prior to visit.     Objective:     BP (!) 146/74   Pulse 83   Ht 5' 4" (1.626 m)   Wt 53.9 kg (118 lb 13.3 oz)   SpO2 98%   BMI 20.40 kg/m²     Physical Exam  Constitutional:       General: She is not in acute distress.     Appearance: Normal appearance. She is well-developed. She is not toxic-appearing or diaphoretic.   HENT:      Head: Normocephalic and atraumatic.      Nose: Nose normal.   Eyes:      General:         Right eye: No discharge.         Left eye: No discharge.      Conjunctiva/sclera:      Right eye: Right conjunctiva is not injected.      Left eye: Left conjunctiva is not injected.      Pupils: Pupils are equal.      Right eye: Pupil is round.      Left eye: Pupil is round.   Neck:      Thyroid: No thyromegaly.      Vascular: No carotid bruit or JVD.   Cardiovascular:      Rate and Rhythm: " Normal rate. Rhythm irregularly irregular. No extrasystoles are present.     Chest Wall: PMI is not displaced.      Pulses:           Radial pulses are 2+ on the right side and 2+ on the left side.        Femoral pulses are 2+ on the right side and 2+ on the left side.       Dorsalis pedis pulses are 2+ on the right side and 2+ on the left side.        Posterior tibial pulses are 2+ on the right side and 2+ on the left side.      Heart sounds: S1 normal and S2 normal. Murmur heard.      High-pitched blowing holosystolic murmur is present with a grade of 2/6 at the apex.      No gallop.   Pulmonary:      Effort: Pulmonary effort is normal.      Breath sounds: Normal breath sounds.   Abdominal:      Palpations: Abdomen is soft.      Tenderness: There is no abdominal tenderness.   Musculoskeletal:      Cervical back: Neck supple.      Right ankle: No swelling, deformity or ecchymosis.      Left ankle: No swelling, deformity or ecchymosis.   Lymphadenopathy:      Head:      Right side of head: No submandibular adenopathy.      Left side of head: No submandibular adenopathy.      Cervical: No cervical adenopathy.   Skin:     General: Skin is warm and dry.   Neurological:      General: No focal deficit present.      Mental Status: She is alert and oriented to person, place, and time. She is not disoriented.      Cranial Nerves: No cranial nerve deficit.   Psychiatric:         Attention and Perception: Attention and perception normal.         Mood and Affect: Mood and affect normal.         Speech: Speech normal.         Behavior: Behavior normal.         Thought Content: Thought content normal.         Cognition and Memory: Cognition and memory normal.         Judgment: Judgment normal.       Assessment:     1. Heart failure, diastolic, chronic    2. Permanent atrial fibrillation    3. Chronic anticoagulation    4. Nonrheumatic mitral valve regurgitation    5. Pulmonary hypertension due to left heart disease    6. Primary  "hypertension      Plan:     1. Heart Failure, Diastolic, Chronic              6/19/2020: Echo: Normal left ventricular size with low normal systolic function. EF 50%. Severely dilated LA. Severely dilated RA. Moderate aortic valve sclerosis. Mild to moderate AR. Moderate MR. Moderate TR. SPAP 56 mmHg.               6/18/2020: Presented in HF. Received furosemide 20 mg iv Q12.              6/22/2020: .   4/19/2023: Lisinopril 40 mg Q24 was reduced to lisinopril 10 mg Q24 and spironolactone 25 mg was reduced to spironolactone 12.5 mg Q24 as pressure had been on low side and K 5.2. In 2025 the dose was increased due to mild abdominal distention.     4/2025: Lisinopril 10 mg Q24 was discontinued and she increased spironolactone 25 mg Q24 and furosemide 40 mg Q24 to spironolactone 25 mg "Q12" and furosemide 40 mg "Q12" due to low blood pressure, SOB and abdominal fullness.              On metoprolol 100 mg Q12, diltiazem 180 mg Q24, spironolactone 25 mg "Q12" and furosemide 40 mg "Q12".   A SGLT2i would be a reasonable addition.              Appears well compensated.   6/19/2025: Do CMP, CBC, BNP, dig, CXR and Echo. Then decide on dose of diuretics.    2. Atrial Fibrillation              5/20/2020: Clinical onset of persistent atrial fibrillation.              6/18/2020: Presents with fast VRR. Received diltiazem iv.               [id number]=3 (CHA2Sc).              6/19/2020: Began digoxin.              On metoprolol 100 mg Q12 and digoxin 0.125 mg Q24.              Anticoagulation with apixiban.              6/21/2020: VRR on high side.              6/21/2020: Could possibly consider RAYSA guided CV in the future but likelihood on long term success with rhythm control strategy would be low. Long discussion with patient about options. 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 We decided on a rate control strategy at least for now which " "I agree is very reasonable.              6/22/2020: Digoxin 1.0.   7/28/2020: Holter: Atrial fibrillation 94 () bpm. No pauses. 8 VPC's. No symptoms.              On metoprolol 100 mg Q12 and digoxin 0.25 mg Q24.   9/2021: [place], [place]: Admitted with fast VRR. Diltiazem 180 mg Q24 was begun.   10/22/2021: Metoprolol tartrate 100 mg Q12 was changed to metoprolol succinate 100 mg Q12.   10/5/2022: Holter: Atrial fibrillation 80 () bpm. No prolonged pauses. Narrow QRS. Rare VPCs - <1/hr. No reported symptoms.     11/20/2023: Holter: Atrial fibrillation 81 () bpm. Narrow QRS. No pauses. Very rare VPC's - total of 8 beats. No reported symptoms.   10/29/2024: Holter: Atrial fibrillation 82 () bpm. Narrow QRS. No pauses. Rare VPCs - 4/hr. No reported symptoms.   On metoprolol 100 mg Q12, diltiazem 180 mg Q24 and digoxin 0.25 mg Q24.   Needing 3 drugs for control of rate.              Rate appears well controlled.   She is monitoring her heart rate with a FitBit and B/P cuff.   6/19/2025: 10/2025: Plan was next holter x 24 hours. Do.                  3. Chronic Anticoagulation              [id number] (CHA2Sc).              Anticoagulation with apixiban.              On apixiban 2.5 mg Q12.              No aspirin or NSAID.     4. Mitral Regurgitation              6/19/2020: Echo: Moderate MR.     5. Pulmonary Hypertension              6/19/2020: Echo: SPAP 56 mmHg.               6. Hypertension   2010: Diagnosed.              Used to be on nadolol 40 mg Q12 and lisinopril 40 mg Q24.              On metoprolol 100 mg Q12, diltiazem 180 mg Q24, spironolactone 25 mg "Q12" and furosemide 40 mg "Q12".   Keeping log at home.   Well controlled.      7. History of Hyponatremia              6/20/2020: Na 128.              Fluid restriction.              6/22/2020: Na 135.              Improving.      8. Migraine               Occasional tramadol.     9. History of Confusion              " 6/19/2020: Agitation and confusion.              Resolved.     10. Primary Care              Dr. Abran Terrazas.     F/u 1 month.    Donnell Mckeon M.D.

## 2025-06-25 ENCOUNTER — HOSPITAL ENCOUNTER (OUTPATIENT)
Dept: RADIOLOGY | Facility: OTHER | Age: 85
Discharge: HOME | End: 2025-06-25
Attending: INTERNAL MEDICINE
Payer: MEDICARE

## 2025-06-25 ENCOUNTER — HOSPITAL ENCOUNTER (OUTPATIENT)
Dept: CARDIOLOGY | Facility: OTHER | Age: 85
Discharge: HOME | End: 2025-06-25
Attending: INTERNAL MEDICINE
Payer: MEDICARE

## 2025-06-25 ENCOUNTER — RESULTS FOLLOW-UP (OUTPATIENT)
Dept: CARDIOLOGY | Facility: CLINIC | Age: 85
End: 2025-06-25

## 2025-06-25 VITALS
SYSTOLIC BLOOD PRESSURE: 146 MMHG | DIASTOLIC BLOOD PRESSURE: 74 MMHG | WEIGHT: 118 LBS | BODY MASS INDEX: 20.14 KG/M2 | HEART RATE: 83 BPM | HEIGHT: 64 IN

## 2025-06-25 DIAGNOSIS — I50.32 HEART FAILURE, DIASTOLIC, CHRONIC: ICD-10-CM

## 2025-06-25 LAB
AORTIC SIZE INDEX (SOV): 1.8 CM/M2
AORTIC SIZE INDEX: 2 CM/M2
ASCENDING AORTA: 3.1 CM
AV INDEX (PROSTH): 0.97
AV MEAN GRADIENT: 4 MMHG
AV PEAK GRADIENT: 8 MMHG
AV REGURGITATION PRESSURE HALF TIME: 571 MS
AV VALVE AREA BY VELOCITY RATIO: 1.9 CM²
AV VALVE AREA: 2.2 CM²
AV VELOCITY RATIO: 0.86
BSA FOR ECHO PROCEDURE: 1.55 M2
CV ECHO LV RWT: 0.44 CM
DOP CALC AO PEAK VEL: 1.4 M/S
DOP CALC AO VTI: 20.9 CM
DOP CALC LVOT AREA: 2.3 CM2
DOP CALC LVOT DIAMETER: 1.7 CM
DOP CALC LVOT PEAK VEL: 1.2 M/S
DOP CALC LVOT STROKE VOLUME: 45.8 CM3
DOP CALC RVOT PEAK VEL: 0.98 M/S
DOP CALC RVOT VTI: 17.6 CM
DOP CALCLVOT PEAK VEL VTI: 20.2 CM
E WAVE DECELERATION TIME: 157 MSEC
E/E' RATIO: 7 M/S
ECHO LV POSTERIOR WALL: 0.9 CM (ref 0.6–1.1)
EJECTION FRACTION: 60 %
FRACTIONAL SHORTENING: 29.3 % (ref 28–44)
INTERVENTRICULAR SEPTUM: 0.9 CM (ref 0.6–1.1)
IVC DIAMETER: 0.97 CM
IVRT: 118 MSEC
LA MAJOR: 6 CM
LA MINOR: 5.3 CM
LA WIDTH: 3.9 CM
LEFT ATRIUM AREA SYSTOLIC (APICAL 2 CHAMBER): 18.21 CM2
LEFT ATRIUM AREA SYSTOLIC (APICAL 4 CHAMBER): 20.97 CM2
LEFT ATRIUM SIZE: 3.6 CM
LEFT ATRIUM VOLUME INDEX MOD: 37 ML/M2
LEFT ATRIUM VOLUME INDEX: 43 ML/M2
LEFT ATRIUM VOLUME MOD: 57 ML
LEFT ATRIUM VOLUME: 67 CM3
LEFT INTERNAL DIMENSION IN SYSTOLE: 2.9 CM (ref 2.1–4)
LEFT VENTRICLE DIASTOLIC VOLUME INDEX: 46.15 ML/M2
LEFT VENTRICLE DIASTOLIC VOLUME: 72 ML
LEFT VENTRICLE END SYSTOLIC VOLUME APICAL 2 CHAMBER: 50.99 ML
LEFT VENTRICLE END SYSTOLIC VOLUME APICAL 4 CHAMBER: 57.59 ML
LEFT VENTRICLE MASS INDEX: 73.2 G/M2
LEFT VENTRICLE SYSTOLIC VOLUME INDEX: 20.5 ML/M2
LEFT VENTRICLE SYSTOLIC VOLUME: 32 ML
LEFT VENTRICULAR INTERNAL DIMENSION IN DIASTOLE: 4.1 CM (ref 3.5–6)
LEFT VENTRICULAR MASS: 114.1 G
LV LATERAL E/E' RATIO: 6 M/S
LV SEPTAL E/E' RATIO: 7.1 M/S
LVED V (TEICH): 72.42 ML
LVES V (TEICH): 32.39 ML
LVOT MG: 3.56 MMHG
LVOT MV: 0.91 CM/S
MV PEAK A VEL: 0 M/S
MV PEAK E VEL: 0.78 M/S
MV STENOSIS PRESSURE HALF TIME: 45.51 MS
MV VALVE AREA P 1/2 METHOD: 4.83 CM2
OHS CV RV/LV RATIO: 0.83 CM
PISA AR MAX VEL: 4.05 M/S
PISA MRMAX VEL: 3.8 M/S
PISA TR MAX VEL: 2.8 M/S
PV MEAN GRADIENT: 2 MMHG
PV PEAK GRADIENT: 3 MMHG
PV PEAK VELOCITY: 0.89 M/S
RA MAJOR: 5.55 CM
RA PRESSURE ESTIMATED: 3 MMHG
RA WIDTH: 5.6 CM
RIGHT VENTRICLE DIASTOLIC BASEL DIMENSION: 3.4 CM
RV TB RVSP: 6 MMHG
RV TISSUE DOPPLER FREE WALL SYSTOLIC VELOCITY 1 (APICAL 4 CHAMBER VIEW): 11.68 CM/S
SINUS: 2.8 CM
STJ: 2.7 CM
TDI LATERAL: 0.13 M/S
TDI SEPTAL: 0.11 M/S
TDI: 0.12 M/S
TR MAX PG: 32 MMHG
TV REST PULMONARY ARTERY PRESSURE: 34 MMHG
Z-SCORE OF LEFT VENTRICULAR DIMENSION IN END DIASTOLE: -0.93
Z-SCORE OF LEFT VENTRICULAR DIMENSION IN END SYSTOLE: 0.3

## 2025-06-25 PROCEDURE — 93306 TTE W/DOPPLER COMPLETE: CPT

## 2025-06-25 PROCEDURE — 71046 X-RAY EXAM CHEST 2 VIEWS: CPT | Mod: TC,FY

## 2025-06-25 PROCEDURE — 93306 TTE W/DOPPLER COMPLETE: CPT | Mod: 26,,, | Performed by: INTERNAL MEDICINE

## 2025-06-25 PROCEDURE — 71046 X-RAY EXAM CHEST 2 VIEWS: CPT | Mod: 26,,, | Performed by: RADIOLOGY

## 2025-06-26 ENCOUNTER — TELEPHONE (OUTPATIENT)
Dept: CARDIOLOGY | Facility: CLINIC | Age: 85
End: 2025-06-26
Payer: MEDICARE

## 2025-06-26 ENCOUNTER — PATIENT MESSAGE (OUTPATIENT)
Dept: CARDIOLOGY | Facility: CLINIC | Age: 85
End: 2025-06-26
Payer: MEDICARE

## 2025-06-26 DIAGNOSIS — I48.21 PERMANENT ATRIAL FIBRILLATION: Primary | ICD-10-CM

## 2025-06-26 RX ORDER — DIGOXIN 125 MCG
125 TABLET ORAL DAILY
Qty: 90 TABLET | Refills: 3 | Status: SHIPPED | OUTPATIENT
Start: 2025-06-26 | End: 2026-06-26

## 2025-06-26 RX ORDER — DIGOXIN 125 MCG
125 TABLET ORAL DAILY
Qty: 90 TABLET | Refills: 3 | Status: CANCELLED | OUTPATIENT
Start: 2025-06-26 | End: 2026-06-26

## 2025-06-26 NOTE — TELEPHONE ENCOUNTER
Left message on voice mail for pt to return my call.    Pt's dtr notified and verbalized understanding.      ----- Message from Donnell Mckeon MD sent at 6/25/2025  4:20 PM CDT -----  CXR reveals clear lungs.    Donnell Mckeon M.D.    ----- Message -----  From: Interface, Rad Results In  Sent: 6/25/2025  11:22 AM CDT  To: Donnell Mckeon MD

## 2025-06-26 NOTE — TELEPHONE ENCOUNTER
Left message on voice mail      ----- Message from Donnell Mckeon MD sent at 6/25/2025  4:20 PM CDT -----  CXR reveals clear lungs.    Donnell Mckeon M.D.    ----- Message -----  From: Interface, Rad Results In  Sent: 6/25/2025  11:22 AM CDT  To: Donnell Mckeon MD

## 2025-07-28 ENCOUNTER — PATIENT MESSAGE (OUTPATIENT)
Dept: CARDIOLOGY | Facility: CLINIC | Age: 85
End: 2025-07-28
Payer: MEDICARE

## 2025-08-13 ENCOUNTER — HOSPITAL ENCOUNTER (OUTPATIENT)
Dept: CARDIOLOGY | Facility: OTHER | Age: 85
Discharge: HOME | End: 2025-08-13
Attending: INTERNAL MEDICINE
Payer: MEDICARE

## 2025-08-13 DIAGNOSIS — I48.21 PERMANENT ATRIAL FIBRILLATION: ICD-10-CM

## 2025-08-13 PROCEDURE — 93226 XTRNL ECG REC<48 HR SCAN A/R: CPT

## 2025-08-18 ENCOUNTER — PATIENT MESSAGE (OUTPATIENT)
Dept: CARDIOLOGY | Facility: CLINIC | Age: 85
End: 2025-08-18
Payer: MEDICARE

## 2025-08-26 ENCOUNTER — OFFICE VISIT (OUTPATIENT)
Dept: CARDIOLOGY | Facility: CLINIC | Age: 85
End: 2025-08-26
Attending: INTERNAL MEDICINE
Payer: MEDICARE

## 2025-08-26 VITALS
DIASTOLIC BLOOD PRESSURE: 60 MMHG | BODY MASS INDEX: 20.01 KG/M2 | SYSTOLIC BLOOD PRESSURE: 129 MMHG | OXYGEN SATURATION: 97 % | HEIGHT: 64 IN | HEART RATE: 91 BPM | WEIGHT: 117.19 LBS

## 2025-08-26 DIAGNOSIS — I50.32 HEART FAILURE, DIASTOLIC, CHRONIC: ICD-10-CM

## 2025-08-26 DIAGNOSIS — G43.009 MIGRAINE WITHOUT AURA AND WITHOUT STATUS MIGRAINOSUS, NOT INTRACTABLE: ICD-10-CM

## 2025-08-26 DIAGNOSIS — I34.0 NONRHEUMATIC MITRAL VALVE REGURGITATION: ICD-10-CM

## 2025-08-26 DIAGNOSIS — I10 PRIMARY HYPERTENSION: ICD-10-CM

## 2025-08-26 DIAGNOSIS — I27.22 PULMONARY HYPERTENSION DUE TO LEFT HEART DISEASE: ICD-10-CM

## 2025-08-26 DIAGNOSIS — I48.21 PERMANENT ATRIAL FIBRILLATION: ICD-10-CM

## 2025-08-26 DIAGNOSIS — J90 RECURRENT RIGHT PLEURAL EFFUSION: ICD-10-CM

## 2025-08-26 DIAGNOSIS — Z79.01 CHRONIC ANTICOAGULATION: ICD-10-CM

## 2025-08-26 PROCEDURE — 99999 PR PBB SHADOW E&M-EST. PATIENT-LVL III: CPT | Mod: PBBFAC,,, | Performed by: INTERNAL MEDICINE

## 2025-08-26 PROCEDURE — 99214 OFFICE O/P EST MOD 30 MIN: CPT | Mod: S$PBB,,, | Performed by: INTERNAL MEDICINE

## 2025-08-26 PROCEDURE — 99213 OFFICE O/P EST LOW 20 MIN: CPT | Mod: PBBFAC | Performed by: INTERNAL MEDICINE

## 2025-09-02 ENCOUNTER — NURSE TRIAGE (OUTPATIENT)
Dept: ADMINISTRATIVE | Facility: CLINIC | Age: 85
End: 2025-09-02
Payer: MEDICARE

## 2025-09-04 ENCOUNTER — TELEPHONE (OUTPATIENT)
Dept: CARDIOLOGY | Facility: CLINIC | Age: 85
End: 2025-09-04
Payer: MEDICARE

## 2025-09-04 ENCOUNTER — OFFICE VISIT (OUTPATIENT)
Dept: CARDIOLOGY | Facility: CLINIC | Age: 85
End: 2025-09-04
Attending: INTERNAL MEDICINE
Payer: MEDICARE

## 2025-09-04 VITALS
SYSTOLIC BLOOD PRESSURE: 120 MMHG | OXYGEN SATURATION: 97 % | BODY MASS INDEX: 20.32 KG/M2 | HEART RATE: 89 BPM | DIASTOLIC BLOOD PRESSURE: 68 MMHG | HEIGHT: 64 IN | WEIGHT: 119.06 LBS

## 2025-09-04 DIAGNOSIS — I34.0 NONRHEUMATIC MITRAL VALVE REGURGITATION: ICD-10-CM

## 2025-09-04 DIAGNOSIS — G43.009 MIGRAINE WITHOUT AURA AND WITHOUT STATUS MIGRAINOSUS, NOT INTRACTABLE: ICD-10-CM

## 2025-09-04 DIAGNOSIS — I48.21 PERMANENT ATRIAL FIBRILLATION: ICD-10-CM

## 2025-09-04 DIAGNOSIS — J90 RECURRENT RIGHT PLEURAL EFFUSION: ICD-10-CM

## 2025-09-04 DIAGNOSIS — I50.32 HEART FAILURE, DIASTOLIC, CHRONIC: ICD-10-CM

## 2025-09-04 DIAGNOSIS — I10 PRIMARY HYPERTENSION: ICD-10-CM

## 2025-09-04 DIAGNOSIS — Z79.01 CHRONIC ANTICOAGULATION: ICD-10-CM

## 2025-09-04 DIAGNOSIS — I27.22 PULMONARY HYPERTENSION DUE TO LEFT HEART DISEASE: ICD-10-CM

## 2025-09-04 PROCEDURE — 99999 PR PBB SHADOW E&M-EST. PATIENT-LVL III: CPT | Mod: PBBFAC,,, | Performed by: INTERNAL MEDICINE

## 2025-09-04 PROCEDURE — 99213 OFFICE O/P EST LOW 20 MIN: CPT | Mod: S$PBB,,, | Performed by: INTERNAL MEDICINE

## 2025-09-04 PROCEDURE — 99213 OFFICE O/P EST LOW 20 MIN: CPT | Mod: PBBFAC | Performed by: INTERNAL MEDICINE
